# Patient Record
Sex: MALE | Race: WHITE | NOT HISPANIC OR LATINO | Employment: FULL TIME | URBAN - METROPOLITAN AREA
[De-identification: names, ages, dates, MRNs, and addresses within clinical notes are randomized per-mention and may not be internally consistent; named-entity substitution may affect disease eponyms.]

---

## 2017-12-22 ENCOUNTER — OFFICE VISIT (OUTPATIENT)
Dept: URGENT CARE | Facility: CLINIC | Age: 62
End: 2017-12-22
Payer: COMMERCIAL

## 2017-12-22 PROCEDURE — 99203 OFFICE O/P NEW LOW 30 MIN: CPT

## 2017-12-27 NOTE — PROGRESS NOTES
Assessment   1  Cerumen impaction (380 4) (H61 20)   2  Squamous cell carcinoma (173 92) (C44 92)    Discussion/Summary   Discussion Summary:    Attempted to remove cerumen from L ear with lavage and currette but was unsuccessful to c/w debrox drops BID x 1 week and return for lavage to follow up with dermatologist for lesion on toe  Medication Side Effects Reviewed: Possible side effects of new medications were reviewed with the patient/guardian today  Understands and agrees with treatment plan: The treatment plan was reviewed with the patient/guardian  The patient/guardian understands and agrees with the treatment plan    Counseling Documentation With Imm: The patient, patient's family was counseled regarding instructions for management,-- patient and family education,-- importance of compliance with treatment  Chief Complaint   1  Ear Pain  Chief Complaint Free Text Note Form: Ear pain and blockage for 4 days      History of Present Illness   HPI: 67yo M p/w ear blockage x 4 days and lesion on toe x several mo  Pt has been using debrox drops without relief  Pt states he frequently gets his ears cleansed by his ENT but has not had this done in some time  Pt states that lesion on toe is very fragile and bleeds frequently  Pt has hx of SCC  Hospital Based Practices Required Assessment:      Pain Assessment      the patient states they have pain  The pain is located in the ear  Abuse And Domestic Violence Screen       Yes, the patient is safe at home  -- The patient states no one is hurting them  Depression And Suicide Screen  No, the patient has not had thoughts of hurting themself  No, the patient has not felt depressed in the past 7 days  Review of Systems   Focused-Male:      Constitutional: no fever or chills, feels well, no tiredness, no recent weight loss or weight gain        ENT: earache, but-- no nosebleeds,-- no sore throat,-- no hearing loss,-- no nasal discharge-- and-- no hoarseness  Cardiovascular: no complaints of slow or fast heart rate, no chest pain, no palpitations, no leg claudication or lower extremity edema  Respiratory: no complaints of shortness of breath, no wheezing or cough, no dyspnea on exertion, no orthopnea or PND  Gastrointestinal: no complaints of abdominal pain, no constipation, no nausea or vomiting, no diarrhea or bloody stools  Genitourinary: no complaints of dysuria or incontinence, no hesitancy, no nocturia, no genital lesion, no inadequacy of penile erection  Musculoskeletal: no complaints of arthralgia, no myalgia, no joint swelling or stiffness, no limb pain or swelling  Integumentary: skin lesion, but-- no rashes,-- no itching-- and-- no skin wound  Neurological: no complaints of headache, no confusion, no numbness or tingling, no dizziness or fainting  ROS Reviewed:    ROS reviewed  Active Problems   1  Benign essential hypertension (401 1) (I10)   2  Diabetes (250 00) (E11 9)    Past Medical History   1  History of cardiac disorder (V12 50) (Z86 79)  Active Problems And Past Medical History Reviewed: The active problems and past medical history were reviewed and updated today  Family History   Family History Reviewed: The family history was reviewed and updated today  Social History    · Former smoker (W46 64) (C00 165)  Social History Reviewed: The social history was reviewed and is unchanged  Surgical History   1  History of Cholecystectomy   2  History of Dilation Salivary Duct   3  History of Heart Surgery  Surgical History Reviewed: The surgical history was reviewed and updated today  Current Meds    1  Aspirin 81 MG CAPS; Therapy: (Recorded:[id number]) to Recorded   2  Citalopram Hydrobromide TABS; Therapy: (Gaylin Meenakshi) to Recorded   3  Farxiga 5 MG Oral Tablet; Therapy: (Gaylin Meenakshi) to Recorded   4   HydroCHLOROthiazide 12 5 MG Oral Capsule; Therapy: (Sean Vu) to Recorded   5  NexIUM PACK; Therapy: (Recorded:[id number]) to Recorded  Medication List Reviewed: The medication list was reviewed and updated today  Allergies   1  No Known Drug Allergies    Vitals   Signs   Recorded: [id number] 12:50PM   Temperature: 97 6 F  Heart Rate: 74  Respiration: 18  Systolic: 150  Diastolic: 94  Height: 5 ft 7 in  Weight: 200 lb   BMI Calculated: 31 32  BSA Calculated: 2 02  O2 Saturation: 96    Physical Exam        Constitutional      General appearance: No acute distress, well appearing and well nourished  Ears, Nose, Mouth, and Throat      External inspection of ears and nose: Normal        Otoscopic examination: Abnormal   The right tympanic membrane was obscured completely by cerumen  The left tympanic membrane was obscured completely by cerumen  The right external canal was normal  The left external canal was normal       Nasal mucosa, septum, and turbinates: Normal without edema or erythema  Oropharynx: Normal with no erythema, edema, exudate or lesions  Pulmonary      Respiratory effort: No increased work of breathing or signs of respiratory distress  Auscultation of lungs: Clear to auscultation  no rales or crackles were heard bilaterally  no rhonchi  no friction rub  no wheezing  no diminished breath sounds  Cardiovascular      Palpation of heart: Normal PMI, no thrills  Auscultation of heart: Normal rate and rhythm, normal S1 and S2, without murmurs  Abdomen      Abdomen: Non-tender, no masses  Lymphatic      Palpation of lymph nodes in neck: Abnormal   bilateral anterior cervical node enlargement, but-- no posterior cervical node enlargement  Skin      Skin and subcutaneous tissue: Abnormal  -- pink, pearly half cm lesion to tip of third R toe        Psychiatric      Orientation to person, place and time: Normal        Mood and affect: Normal        Signatures    Electronically signed by : ROSA Campos; Dec 22 2017  3:08PM EST                       (Author)     Electronically signed by : VIVIEN Lopez ; Dec 26 2017 10:28AM EST                       (Co-author)

## 2018-01-23 VITALS
HEIGHT: 67 IN | TEMPERATURE: 97.6 F | OXYGEN SATURATION: 96 % | BODY MASS INDEX: 31.39 KG/M2 | SYSTOLIC BLOOD PRESSURE: 160 MMHG | WEIGHT: 200 LBS | DIASTOLIC BLOOD PRESSURE: 94 MMHG | RESPIRATION RATE: 18 BRPM | HEART RATE: 74 BPM

## 2020-09-21 LAB
LEFT EYE DIABETIC RETINOPATHY: NORMAL
RIGHT EYE DIABETIC RETINOPATHY: NORMAL

## 2020-11-07 LAB — HBA1C MFR BLD HPLC: 6.7 %

## 2021-02-17 ENCOUNTER — HOSPITAL ENCOUNTER (OUTPATIENT)
Dept: RADIOLOGY | Facility: CLINIC | Age: 66
Discharge: HOME | End: 2021-02-17
Payer: COMMERCIAL

## 2021-02-17 VITALS
SYSTOLIC BLOOD PRESSURE: 146 MMHG | TEMPERATURE: 98.6 F | HEART RATE: 93 BPM | BODY MASS INDEX: 31.39 KG/M2 | WEIGHT: 200 LBS | DIASTOLIC BLOOD PRESSURE: 90 MMHG | HEIGHT: 67 IN

## 2021-02-17 DIAGNOSIS — G89.29 CHRONIC LEFT SHOULDER PAIN: ICD-10-CM

## 2021-02-17 DIAGNOSIS — G89.29 CHRONIC LEFT SHOULDER PAIN: Primary | ICD-10-CM

## 2021-02-17 DIAGNOSIS — M75.82 TENDINITIS OF LEFT ROTATOR CUFF: ICD-10-CM

## 2021-02-17 DIAGNOSIS — M25.512 CHRONIC LEFT SHOULDER PAIN: ICD-10-CM

## 2021-02-17 DIAGNOSIS — M25.512 CHRONIC LEFT SHOULDER PAIN: Primary | ICD-10-CM

## 2021-02-17 PROCEDURE — 20610 DRAIN/INJ JOINT/BURSA W/O US: CPT | Performed by: ORTHOPAEDIC SURGERY

## 2021-02-17 PROCEDURE — 73030 X-RAY EXAM OF SHOULDER: CPT

## 2021-02-17 PROCEDURE — 99204 OFFICE O/P NEW MOD 45 MIN: CPT | Performed by: ORTHOPAEDIC SURGERY

## 2021-02-17 RX ORDER — CARVEDILOL 25 MG/1
25 TABLET ORAL 2 TIMES DAILY WITH MEALS
COMMUNITY
Start: 2021-02-17 | End: 2021-08-12 | Stop reason: SDUPTHER

## 2021-02-17 RX ORDER — ANTIOX #8/OM3/DHA/EPA/LUT/ZEAX 250-2.5 MG
1 CAPSULE ORAL DAILY
COMMUNITY
Start: 2014-08-20

## 2021-02-17 RX ORDER — TRIAMCINOLONE ACETONIDE 5 MG/G
1 CREAM TOPICAL 2 TIMES DAILY
COMMUNITY
Start: 2021-01-25 | End: 2021-04-26

## 2021-02-17 RX ORDER — SEMAGLUTIDE 1.34 MG/ML
1 INJECTION, SOLUTION SUBCUTANEOUS WEEKLY
COMMUNITY
Start: 2021-02-17 | End: 2021-10-18

## 2021-02-17 RX ORDER — ATORVASTATIN CALCIUM 80 MG/1
80 TABLET, FILM COATED ORAL DAILY
COMMUNITY
Start: 2021-02-17 | End: 2021-08-12 | Stop reason: SDUPTHER

## 2021-02-17 RX ORDER — LIDOCAINE HYDROCHLORIDE 10 MG/ML
4 INJECTION, SOLUTION INFILTRATION; PERINEURAL
Status: COMPLETED | OUTPATIENT
Start: 2021-02-17 | End: 2021-02-17

## 2021-02-17 RX ORDER — HYDROCHLOROTHIAZIDE 25 MG/1
25 TABLET ORAL DAILY
COMMUNITY
Start: 2021-02-17 | End: 2021-08-12 | Stop reason: SDUPTHER

## 2021-02-17 RX ORDER — RAMIPRIL 10 MG/1
10 CAPSULE ORAL DAILY
COMMUNITY
Start: 2021-02-17 | End: 2021-08-12 | Stop reason: SDUPTHER

## 2021-02-17 RX ORDER — NAPROXEN 500 MG/1
500 TABLET ORAL 2 TIMES DAILY WITH MEALS
Qty: 60 TABLET | Refills: 1 | Status: SHIPPED | OUTPATIENT
Start: 2021-02-17 | End: 2021-04-26

## 2021-02-17 RX ORDER — DAPAGLIFLOZIN 10 MG/1
TABLET, FILM COATED ORAL
COMMUNITY
Start: 2021-01-08 | End: 2021-08-12 | Stop reason: SDUPTHER

## 2021-02-17 RX ORDER — TRIAMCINOLONE ACETONIDE 40 MG/ML
40 INJECTION, SUSPENSION INTRA-ARTICULAR; INTRAMUSCULAR
Status: COMPLETED | OUTPATIENT
Start: 2021-02-17 | End: 2021-02-17

## 2021-02-17 RX ORDER — ASPIRIN 81 MG/1
81 TABLET ORAL DAILY
COMMUNITY
Start: 2014-08-20

## 2021-02-17 RX ORDER — CITALOPRAM 10 MG/1
10 TABLET ORAL DAILY
COMMUNITY
Start: 2021-02-17 | End: 2021-08-12 | Stop reason: SDUPTHER

## 2021-02-17 RX ORDER — ESOMEPRAZOLE MAGNESIUM 5 MG/1
5 GRANULE, DELAYED RELEASE ORAL DAILY
COMMUNITY
End: 2021-08-12 | Stop reason: SDUPTHER

## 2021-02-17 RX ADMIN — TRIAMCINOLONE ACETONIDE 40 MG: 40 INJECTION, SUSPENSION INTRA-ARTICULAR; INTRAMUSCULAR at 18:14

## 2021-02-17 RX ADMIN — LIDOCAINE HYDROCHLORIDE 4 ML: 10 INJECTION, SOLUTION INFILTRATION; PERINEURAL at 18:14

## 2021-02-17 NOTE — PROGRESS NOTES
ASSESSMENT/PLAN:    Diagnoses and all orders for this visit:    Chronic left shoulder pain  -     XR shoulder 2+ vw left; Future  -     naproxen (NAPROSYN) 500 mg tablet; Take 1 tablet (500 mg total) by mouth 2 (two) times a day with meals    Tendinitis of left rotator cuff    Other orders  -     aspirin (Aspirin Adult Low Strength) 81 mg EC tablet  -     atorvastatin (LIPITOR) 80 mg tablet  -     carvedilol (COREG) 25 mg tablet  -     citalopram (CeleXA) 10 mg tablet  -     Coenzyme Q10 (CoQ-10) 100 MG CAPS  -     Farxiga 10 MG TABS; TAKE 10 MG BY MOUTH DAILY  -     esomeprazole (NexIUM) 5 MG packet; Take by mouth  -     hydrochlorothiazide (HYDRODIURIL) 25 mg tablet  -     Multiple Vitamins-Minerals (PreserVision AREDS 2) CAPS  -     ramipril (ALTACE) 10 MG capsule  -     Ozempic, 1 MG/DOSE, 2 MG/1 5ML SOPN  -     triamcinolone (KENALOG) 0 5 % cream; Apply 1 application topically 2 (two) times a day To affected area          Plan:  I discussed treatment options  He elected to proceed with injection which was performed without difficulty  Precautions were reviewed  I will see him in 2 weeks for re-evaluation  He has been taking some Aleve for the symptoms and I have prescribed naproxen which should be taken routinely and I have also placed a prescription for therapy into his chart  He is to contact the office if questions or concerns arise  Return in about 2 weeks (around 3/3/2021)  _____________________________________________________  CHIEF COMPLAINT:  Chief Complaint   Patient presents with    Left Shoulder - Pain         SUBJECTIVE:  Caridad Paulino is a 63y o  year old ambidextrous male who presents  For evaluation of left shoulder pain  He notes chronic shoulder pain dating back to beyond 3 months ago  However, 3 months ago, he fell landing on the outstretched left upper extremity as he fell backwards causing an acute exacerbation of his chronic pain    Since that time, the pain has improved to a minimal degree but still remains notably worse than it was prior to the fall  He notes difficulty sleeping  His primary difficulty is with abduction and external rotation  He complains of pain across top of the shoulder radiating down the posterior aspect of the proximal arm  Occasionally he feels pain in his biceps area  He denies upper extremity paresthesias  He has had no treatment  He has used some ice and over-the-counter analgesics on his own  He contact his primary care physician and was referred for orthopedic consultation and treatment  PAST MEDICAL HISTORY:  Past Medical History:   Diagnosis Date    Diabetes mellitus [facility] [address] )     Heart attack ([facility] [address] )     High cholesterol     Hypertension        PAST SURGICAL HISTORY:  Past Surgical History:   Procedure Laterality Date    CHOLECYSTECTOMY      VASECTOMY      VASECTOMY REVERSAL         FAMILY HISTORY:  Family History   Problem Relation Age of Onset    Dementia Mother     No Known Problems Father        SOCIAL HISTORY:  Social History     Tobacco Use    Smoking status: Former Smoker     Quit date: 2015     Years since quittin 0    Smokeless tobacco: Never Used   Substance Use Topics    Alcohol use: Yes     Comment: occasionally    Drug use: Never       MEDICATIONS:    Current Outpatient Medications:     aspirin (Aspirin Adult Low Strength) 81 mg EC tablet, , Disp: , Rfl:     atorvastatin (LIPITOR) 80 mg tablet, , Disp: , Rfl:     carvedilol (COREG) 25 mg tablet, , Disp: , Rfl:     citalopram (CeleXA) 10 mg tablet, , Disp: , Rfl:     Coenzyme Q10 (CoQ-10) 100 MG CAPS, , Disp: , Rfl:     esomeprazole (NexIUM) 5 MG packet, Take by mouth, Disp: , Rfl:     Farxiga 10 MG TABS, TAKE 10 MG BY MOUTH DAILY  , Disp: , Rfl:     hydrochlorothiazide (HYDRODIURIL) 25 mg tablet, , Disp: , Rfl:     Multiple Vitamins-Minerals (PreserVision AREDS 2) CAPS, , Disp: , Rfl:     Ozempic, 1 MG/DOSE, 2 MG/1 5ML SOPN, , Disp: , Rfl:     ramipril (ALTACE) 10 MG capsule, , Disp: , Rfl:     naproxen (NAPROSYN) 500 mg tablet, Take 1 tablet (500 mg total) by mouth 2 (two) times a day with meals, Disp: 60 tablet, Rfl: 1    triamcinolone (KENALOG) 0 5 % cream, Apply 1 application topically 2 (two) times a day To affected area, Disp: , Rfl:     ALLERGIES:  Allergies   Allergen Reactions    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       Review of systems:   Constitutional: Negative for fatigue, fever or loss of apetite  HENT: Negative  Respiratory: Negative for shortness of breath, dyspnea  Cardiovascular: Negative for chest pain/tightness  Gastrointestinal: Negative for abdominal pain, N/V  Endocrine: Negative for cold/heat intolerance, unexplained weight loss/gain  Genitourinary: Negative for flank pain, dysuria, hematuria  Musculoskeletal:   Positive as in the HPI   Skin: Negative for rash  Neurological:  negative  Psychiatric/Behavioral: Negative for agitation  _____________________________________________________  PHYSICAL EXAMINATION:    Blood pressure 146/90, pulse 93, temperature 98 6 °F (37 °C), temperature source Temporal, height 5' 7" (1 702 m), weight 90 7 kg (200 lb)  General: well developed and well nourished, alert, oriented times 3 and appears comfortable  Psychiatric: Normal  HEENT: Benign  Cardiovascular: Regular    Pulmonary: No wheezing or stridor  Abdomen: Soft, Nontender  Skin: No masses, erythema, lacerations, fluctation, ulcerations  Neurovascular: Motor and sensory exams are intact and pulses are palpable  MUSCULOSKELETAL EXAMINATION:      The left shoulder exam demonstrates approximately 140° of abduction and forward flexion with pain during mid range abduction  He has 90° of both internal and external rotation in the abducted position and 40° of external rotation with his arm at his side  He did complain of some pain with abduction/ IR    Magui's, [place]'s, Russo and speed's tests are all negative  He has some mild prominence of the acromioclavicular joint bilaterally but no tenderness  The rotator cuff and biceps tendons are nontender  The shoulder is stable to exam   He has good strength of shoulder musculature but did complain of pain during resisted external rotation of the left shoulder  There is no periscapular tenderness  _____________________________________________________  STUDIES REVIEWED:    X-rays of her shoulder demonstrated no acute abnormality  He does have degenerative disease    PROCEDURES:  Large joint arthrocentesis: L subacromial bursa  Universal Protocol:  Consent: Verbal consent obtained    Consent given by: patient  Patient understanding: patient states understanding of the procedure being performed    Supporting Documentation  Indications: pain   Procedure Details  Location: shoulder - L subacromial bursa  Needle size: 22 G  Approach: posterolateral  Medications administered: 4 mL lidocaine 1 %; 40 mg triamcinolone acetonide 40 mg/mL              Rudy Lilly

## 2021-02-22 ENCOUNTER — EVALUATION (OUTPATIENT)
Dept: PHYSICAL THERAPY | Facility: CLINIC | Age: 66
End: 2021-02-22
Payer: COMMERCIAL

## 2021-02-22 DIAGNOSIS — M75.82 TENDINITIS OF LEFT ROTATOR CUFF: ICD-10-CM

## 2021-02-22 DIAGNOSIS — G89.29 CHRONIC LEFT SHOULDER PAIN: ICD-10-CM

## 2021-02-22 DIAGNOSIS — M25.512 CHRONIC LEFT SHOULDER PAIN: ICD-10-CM

## 2021-02-22 PROCEDURE — 97140 MANUAL THERAPY 1/> REGIONS: CPT | Performed by: PHYSICAL THERAPIST

## 2021-02-22 PROCEDURE — 97162 PT EVAL MOD COMPLEX 30 MIN: CPT | Performed by: PHYSICAL THERAPIST

## 2021-02-22 PROCEDURE — 97535 SELF CARE MNGMENT TRAINING: CPT | Performed by: PHYSICAL THERAPIST

## 2021-02-22 PROCEDURE — 97014 ELECTRIC STIMULATION THERAPY: CPT | Performed by: PHYSICAL THERAPIST

## 2021-02-22 NOTE — PROGRESS NOTES
PT Evaluation   Today's date: 2021  Patient name: Erich Dueñas  : 1955  MRN: [id number]  Referring provider: Dina Beltre DO  Dx:   Encounter Diagnosis     ICD-10-CM    1  Chronic left shoulder pain  M25 512 Ambulatory referral to Physical Therapy    G89 29    2  Tendinitis of left rotator cuff  M75 82 Ambulatory referral to Physical Therapy     Assessment  Assessment details: Patient displayed issues consistent with left shoulder rotator cuff issues / injury  Suspect this was aggravated by his fall a few months ago  Impairments: abnormal or restricted ROM, abnormal movement, activity intolerance, impaired physical strength, lacks appropriate home exercise program, pain with function, safety issue and scapular dyskinesis  Understanding of Dx/Px/POC: excellent   Prognosis: good    Goals  STG 2-4 weeks:   Increase UE strength by 3-6 lbs  Decrease pain by 1-2 levels on 1-10 pain scale  Increase UE PROM by 10-15 Degrees in all planes  Reduce C/O pain with lying on either side  Initiate HEP  LTG 6-8 weeks:   Increase UE strength 10-20 lbs  Demonstrate UE AROM WFL in all planes  Decrease pain to <2  Improve strength by 10-20 lbs  Return to lying on their right or left side with minimal difficulty  Improve sleep status limitations to none  D/C with HEP  Plan  Plan details: All planned modality interventions and planned therapy interventions are provided PRN    Patient would benefit from: PT eval and skilled physical therapy  Planned modality interventions: ultrasound and unattended electrical stimulation  Planned therapy interventions: joint mobilization, manual therapy, neuromuscular re-education, postural training, self care, strengthening, stretching, therapeutic activities, therapeutic exercise, therapeutic training, transfer training, coordination, flexibility, graded exercise and home exercise program  Frequency: 3x week  Duration in weeks: 12  Treatment plan discussed with: patient      Subjective Evaluation    Pain  Quality: discomfort, knife-like, pulling, squeezing, sharp and tight  Relieving factors: support, rest, relaxation and change in position  Aggravating factors: lifting and overhead activity  Progression: worsening    Treatments  Current treatment: physical therapy  Patient Goals  Patient goals for therapy: decreased pain, increased motion, return to work, return to [employer] activities, independence with ADLs/IADLs and increased strength      Date of onset:  12/14/2020    Date of Surgery:  None    History of Present Episode: 2/22/2021  Chelsie Thalia states his left shoulder had been bothering him for several months  He slipped and fell about three months ago and his left shoulder really flared up  He does not know why his left shoulder started to flare up many months before he fell but falling really flared up his left shoulder  Past Medical History:    2/22/2021  Chelsie Thalia reports diabetes type two  Heart stint  Previous Level of Functional Ability:  2/22/2021  Chelsie Vásquez states he had no limitations with his left shoulder  He worked without difficulties or restrictions  Inspection / Palpation:  Shoulder:  2/22/2021  Mesomorphic body type  No signs of infection  No signs of wounds  No signs of drainage  No signs of ecchymotic regions  No signs of erythremic regions  Mild to moderate signs of muscle spasm  Mild to moderate signs of muscle guarding  Mild to moderate signs of tenderness reported to palpation  Mild signs of atrophy noted  No signs of swelling  No signs of a surgery site  Current conditions appears consistent with recent acute episode  Chief Complaints:  2/22/2021  Chelsie Vásquez reports moderate difficulty with bending his left shoulder  Chelsie Vásquez reports moderate difficulty with movement of his left shoulder  Chelsie Vásquez reports moderate difficulty with use of his left arm  Chlesie Vásquez reports moderate difficulty with lifting / elevating his left arm  Lakshmi Man reports moderate difficulty with sleeping  Lakshmi Man reports moderate difficulty with his strength and endurance  Lakshmi Man reports moderate limitations with his left shoulder range of motion  Lakshmi Man reports moderate difficulty lying on his left shoulder region  SHOULDER PAIN Resting Palpation Moving Lifting Elevating   2/22/2021 Rt 0 0 0 0 0   2/22/2021 Lt  0 0-4 0-4 0-4 4-6     SHOULDER PAIN Sleeping Throwing Pushing Pulling Twisting   2/22/2021 Rt 0 0 0 0 0   2/22/2021 Lt  4-6 4-6 0-4 0-4 0-4     SHOULDER AROM Flexion Extension Abduction   2/22/2021 Rt 185° 65° 185°   2/22/2021 Lt 175° 55° 175°     SHOULDER AROM Hor Add Ext Rotation Int Rotation   2/22/2021 Rt 75° 95° 95°   2/22/2021 Lt 65° 82° 95°     SHLD MMT / PAIN Flexion Extension Abduction   2/22/2021 Rt 0/10  49 lbs 0/10  48 lbs 0/10  47 lbs   2/22/2021 Lt 0/10  35 lbs 0/10  32 lbs 0/10  35 lbs     SHLD MMT / PAIN Hor Add Ext Rotation Int Rotation   2/22/2021 Rt 0/10  41 lbs 0/10  32 lbs 0/10  33 lbs   2/22/2021 Lt 0/10  33 lbs 0/10  21 lbs 0/10  22 lbs     Shoulder Screen Rotator Cuff Apprehension Anterior  Stability Posterior  Stability   2/22/2021 Rt Negative Negative Negative Negative   2/22/2021 Lt POSITIVE Negative Negative Negative     Shoulder Screen AC Joint SC Joint Drop Arm Adhesive Capsulitis   2/22/2021 Rt Negative Negative Negative Negative   2/22/2021 Lt Negative Negative POSITIVE Negative     Precautions:  Left Shoulder Rotator Cuff Issues  All treatments below will be provided with a focus on strengthening, flexibility, ROM, postural,   endurance and any possible swelling and pain which may be present without ignoring   neural issues involving balance, coordination and proprioception which is also important   and necessary to provide full functional mobility and quality care        Daily Treatment Log  Manual  2/22       MT, ROM 15'       HEP 15'       Exercise Log 2/22       [facility]itin        [facility]-Curls,[employer]        [employer]        Finger Ladder        [place]-BP,PD,Lats,Row        Nu[employer]        W/P-PNF,IR,ER,PU,PS,Throw-Top,Mid,Bot        W/P-[place]        [employer] Sup/Pro        [facility] [employer] [place], [place] 66'               [facility] 2/22        / [place] / [place] 21'       US

## 2021-02-22 NOTE — LETTER
2021  [facility]  [address]    Patient: Verito Taylor   YOB: 1955   Date of Visit: 2021     Encounter Diagnosis     ICD-10-CM    1  Chronic left shoulder pain  M25 512 Ambulatory referral to Physical Therapy    G89 29    2  Tendinitis of left rotator cuff  M75 82 Ambulatory referral to Physical Therapy       Dear Dr Cesar Poles:    Thank you for your recent referral of Verito Taylor  Please review the attached evaluation summary from Dawson's recent visit  Please verify that you agree with the plan of care by signing the attached order  If you have any questions or concerns, please do not hesitate to call  I sincerely appreciate the opportunity to share in the care of one of your patients and hope to have another opportunity to work with you in the near future  Sincerely,    Malorie Wellington, PT      Referring Provider:      I certify that I have read the below Plan of Care and certify the need for these services furnished under this plan of treatment while under my care  Michael Young DO  [address] [place] [address]  [facility] [facility]          PT Evaluation   Today's date: 2021  Patient name: Verito Taylor  : 1955  MRN: [id number]  Referring provider: Eva Perales DO  Dx:   Encounter Diagnosis     ICD-10-CM    1  Chronic left shoulder pain  M25 512 Ambulatory referral to Physical Therapy    G89 29    2  Tendinitis of left rotator cuff  M75 82 Ambulatory referral to Physical Therapy     Assessment  Assessment details: Patient displayed issues consistent with left shoulder rotator cuff issues / injury  Suspect this was aggravated by his fall a few months ago    Impairments: abnormal or restricted ROM, abnormal movement, activity intolerance, impaired physical strength, lacks appropriate home exercise program, pain with function, safety issue and scapular dyskinesis  Understanding of Dx/Px/POC: excellent   Prognosis: good    Goals  STG 2-4 weeks:   Increase UE strength by 3-6 lbs  Decrease pain by 1-2 levels on 1-10 pain scale  Increase UE PROM by 10-15 Degrees in all planes  Reduce C/O pain with lying on either side  Initiate HEP  LTG 6-8 weeks:   Increase UE strength 10-20 lbs  Demonstrate UE AROM WFL in all planes  Decrease pain to <2  Improve strength by 10-20 lbs  Return to lying on their right or left side with minimal difficulty  Improve sleep status limitations to none  D/C with HEP  Plan  Plan details: All planned modality interventions and planned therapy interventions are provided PRN  Patient would benefit from: PT eval and skilled physical therapy  Planned modality interventions: ultrasound and unattended electrical stimulation  Planned therapy interventions: joint mobilization, manual therapy, neuromuscular re-education, postural training, self care, strengthening, stretching, therapeutic activities, therapeutic exercise, therapeutic training, transfer training, coordination, flexibility, graded exercise and home exercise program  Frequency: 3x week  Duration in weeks: 12  Treatment plan discussed with: patient      Subjective Evaluation    Pain  Quality: discomfort, knife-like, pulling, squeezing, sharp and tight  Relieving factors: support, rest, relaxation and change in position  Aggravating factors: lifting and overhead activity  Progression: worsening    Treatments  Current treatment: physical therapy  Patient Goals  Patient goals for therapy: decreased pain, increased motion, return to work, return to [employer] activities, independence with ADLs/IADLs and increased strength      Date of onset:  12/14/2020    Date of Surgery:  None    History of Present Episode: 2/22/2021  Kymas Mandi states his left shoulder had been bothering him for several months    He slipped and fell about three months ago and his left shoulder really flared up  He does not know why his left shoulder started to flare up many months before he fell but falling really flared up his left shoulder  Past Medical History:    2/22/2021  Quincy Onycha reports diabetes type two  Heart stint  Previous Level of Functional Ability:  2/22/2021  Quincy Onycha states he had no limitations with his left shoulder  He worked without difficulties or restrictions  Inspection / Palpation:  Shoulder:  2/22/2021  Mesomorphic body type  No signs of infection  No signs of wounds  No signs of drainage  No signs of ecchymotic regions  No signs of erythremic regions  Mild to moderate signs of muscle spasm  Mild to moderate signs of muscle guarding  Mild to moderate signs of tenderness reported to palpation  Mild signs of atrophy noted  No signs of swelling  No signs of a surgery site  Current conditions appears consistent with recent acute episode  Chief Complaints:  2/22/2021  Quincy Onycha reports moderate difficulty with bending his left shoulder  Mendel Onycha reports moderate difficulty with movement of his left shoulder  Mendel Onycha reports moderate difficulty with use of his left arm  Mendel Onycha reports moderate difficulty with lifting / elevating his left arm  Quincy Onycha reports moderate difficulty with sleeping  Quincy Onycha reports moderate difficulty with his strength and endurance  Quincy Onycha reports moderate limitations with his left shoulder range of motion  Quincy Onycha reports moderate difficulty lying on his left shoulder region      SHOULDER PAIN Resting Palpation Moving Lifting Elevating   2/22/2021 Rt 0 0 0 0 0   2/22/2021 Lt  0 0-4 0-4 0-4 4-6     SHOULDER PAIN Sleeping Throwing Pushing Pulling Twisting   2/22/2021 Rt 0 0 0 0 0   2/22/2021 Lt  4-6 4-6 0-4 0-4 0-4     SHOULDER AROM Flexion Extension Abduction   2/22/2021 Rt 185° 65° 185°   2/22/2021 Lt 175° 55° 175°     SHOULDER AROM Hor Add Ext Rotation Int Rotation   2/22/2021 Rt 75° 95° 95°   2/22/2021 Lt 65° 82° 95°     SHLD MMT / PAIN Flexion Extension Abduction   2/22/2021 Rt 0/10  49 lbs 0/10  48 lbs 0/10  47 lbs   2/22/2021 Lt 0/10  35 lbs 0/10  32 lbs 0/10  35 lbs     SHLD MMT / PAIN Hor Add Ext Rotation Int Rotation   2/22/2021 Rt 0/10  41 lbs 0/10  32 lbs 0/10  33 lbs   2/22/2021 Lt 0/10  33 lbs 0/10  21 lbs 0/10  22 lbs     Shoulder Screen Rotator Cuff Apprehension Anterior  Stability Posterior  Stability   2/22/2021 Rt Negative Negative Negative Negative   2/22/2021 Lt POSITIVE Negative Negative Negative     Shoulder Screen AC Joint SC Joint Drop Arm Adhesive Capsulitis   2/22/2021 Rt Negative Negative Negative Negative   2/22/2021 Lt Negative Negative POSITIVE Negative     Precautions:  Left Shoulder Rotator Cuff Issues  All treatments below will be provided with a focus on strengthening, flexibility, ROM, postural,   endurance and any possible swelling and pain which may be present without ignoring   neural issues involving balance, coordination and proprioception which is also important   and necessary to provide full functional mobility and quality care        Daily Treatment Log  Manual  2/22       MT, ROM 15'       HEP 15'       Exercise Log 2/22       [facility]        FWC-Codmans        FWC-Curls,Tri        Power Web        Digiflex        Finger Ladder        [place]-BP,PD,Lats,Row        NuStep        W/P-PNF,IR,ER,PU,PS,Throw-Top,Mid,Bot        W/P-OH        [employer] Sup/Pro        Jonathon Joyce [place] 51'               Modalities 2/22        / [facility] / CALEB 21'       US

## 2021-02-24 ENCOUNTER — APPOINTMENT (OUTPATIENT)
Dept: PHYSICAL THERAPY | Facility: CLINIC | Age: 66
End: 2021-02-24
Payer: COMMERCIAL

## 2021-02-25 ENCOUNTER — OFFICE VISIT (OUTPATIENT)
Dept: PHYSICAL THERAPY | Facility: CLINIC | Age: 66
End: 2021-02-25
Payer: COMMERCIAL

## 2021-02-25 DIAGNOSIS — M25.512 CHRONIC LEFT SHOULDER PAIN: Primary | ICD-10-CM

## 2021-02-25 DIAGNOSIS — M75.82 TENDINITIS OF LEFT ROTATOR CUFF: ICD-10-CM

## 2021-02-25 DIAGNOSIS — G89.29 CHRONIC LEFT SHOULDER PAIN: Primary | ICD-10-CM

## 2021-02-25 PROCEDURE — 97112 NEUROMUSCULAR REEDUCATION: CPT | Performed by: PHYSICAL THERAPIST

## 2021-02-25 PROCEDURE — 97140 MANUAL THERAPY 1/> REGIONS: CPT

## 2021-02-25 PROCEDURE — 97014 ELECTRIC STIMULATION THERAPY: CPT

## 2021-02-25 NOTE — PROGRESS NOTES
Today's date: 2021  Patient name: Poornima Dawkins  : 1955  MRN: [id number]  Referring provider: Zachary Snider DO  Dx:   Encounter Diagnosis     ICD-10-CM    1  Chronic left shoulder pain  M25 512     G89 29    2  Tendinitis of left rotator cuff  M75 82      Subjective:  No new c/o pain today  Objective: See treatment log below  Belinda Hunt continues to be advised to follow his home exercise program as tolerated  When Belinda Hunt is feeling good he follows his rehab exercises in the gym and on other days he follows his home exercise program at home as tolerated  In the future we plan on having Dawson stay at home and follow his home exercise program for four to six weeks and than assess for either more Rehab treatments or discharge from Rehab care  Assessment: Tolerated treatment well  Patient exhibited good technique with therapeutic exercises and would benefit from continued PT  Dawson's goals are to continue to improve with his rehab program and improve with functional mobility, speed, repetition and decreased c/o pain with his gym and home exercise program   Dawson's final goal for his rehab is to be discharged from Rehab care after obtaining his full functional rehab potential     Plan: Continue per plan of care  Progress treatment as tolerated  Precautions:  Left Shoulder Rotator Cuff Issues  All treatments below will be provided with a focus on strengthening, flexibility, ROM, postural,   endurance and any possible swelling and pain which may be present without ignoring   neural issues involving balance, coordination and proprioception which is also important   and necessary to provide full functional mobility and quality care        Daily Treatment Log  Manual        MT, ROM 15' 30'      HEP 15'       Exercise Log       UBC  10'      FWC-Nitin BELLA-Rob,Tri        Power Web        Digiflex        Finger Ladder        [place]-BP,PD,Lats,Row        NuStep W/P-PNF,IR,ER,PU,PS,Throw-Top,Mid,Bot        W/P-OH  2x10'      Rotation Bar Sup/Pro        WallSlidesITVY  2x10      [facility] 15' 15'              Modalities 2/22 2/25      MH / PE / ES 21' 20'      US

## 2021-03-02 ENCOUNTER — OFFICE VISIT (OUTPATIENT)
Dept: PHYSICAL THERAPY | Facility: CLINIC | Age: 66
End: 2021-03-02
Payer: COMMERCIAL

## 2021-03-02 DIAGNOSIS — M25.512 CHRONIC LEFT SHOULDER PAIN: Primary | ICD-10-CM

## 2021-03-02 DIAGNOSIS — M75.82 TENDINITIS OF LEFT ROTATOR CUFF: ICD-10-CM

## 2021-03-02 DIAGNOSIS — G89.29 CHRONIC LEFT SHOULDER PAIN: Primary | ICD-10-CM

## 2021-03-02 PROCEDURE — 97014 ELECTRIC STIMULATION THERAPY: CPT | Performed by: PHYSICAL THERAPIST

## 2021-03-02 PROCEDURE — 97112 NEUROMUSCULAR REEDUCATION: CPT

## 2021-03-02 PROCEDURE — 97140 MANUAL THERAPY 1/> REGIONS: CPT | Performed by: PHYSICAL THERAPIST

## 2021-03-02 NOTE — PROGRESS NOTES
Today's date: 3/2/2021  Patient name: Caridad Paulino  : 1955  MRN: [id number]  Referring provider: Cordelia Gillespie DO  Dx:   Encounter Diagnosis     ICD-10-CM    1  Chronic left shoulder pain  M25 512     G89 29    2  Tendinitis of left rotator cuff  M75 82      Subjective:  No new c/o pain today  Objective: See treatment log below  Dori Lott continues to be advised to follow his home exercise program as tolerated  When Dori Lott is feeling good he follows his rehab exercises in the gym and on other days he follows his home exercise program at home as tolerated  In the future we plan on having Dawson stay at home and follow his home exercise program for four to six weeks and than assess for either more Rehab treatments or discharge from Rehab care  Assessment: Tolerated treatment well  Patient exhibited good technique with therapeutic exercises and would benefit from continued PT  Dawson's goals are to continue to improve with his rehab program and improve with functional mobility, speed, repetition and decreased c/o pain with his gym and home exercise program   Dawson's final goal for his rehab is to be discharged from Rehab care after obtaining his full functional rehab potential     Plan: Continue per plan of care  Progress treatment as tolerated  Precautions:  Left Shoulder Rotator Cuff Issues  All treatments below will be provided with a focus on strengthening, flexibility, ROM, postural,   endurance and any possible swelling and pain which may be present without ignoring   neural issues involving balance, coordination and proprioception which is also important   and necessary to provide full functional mobility and quality care        Daily Treatment Log  Manual   3/     MT, ROM 15' 30' 20'     HEP 15'       Exercise Log 2/22 2/25 3/2     UBC  10' 10'     FWC-Codmans        FWC-Curls,Tri        Power Web        Digiflex        Finger Ladder        [place]-BP,PD,Lats,Row NuStep        W/P-PNF,IR,ER,PU,PS,Throw-Top,Mid,Bot        W/P-OH  2x10' 2x10'     Rotation Bar Sup/Pro        WallSlidesITVY  2x10 2x10     Ball Wall Rolls  30x 30x     DORIAN Trejo 15' 15' 15'             Modalities 2/22 2/25 3/2     PARK / PE / ES 21' 20' 20'     US

## 2021-03-03 ENCOUNTER — OFFICE VISIT (OUTPATIENT)
Dept: OBGYN CLINIC | Facility: CLINIC | Age: 66
End: 2021-03-03
Payer: COMMERCIAL

## 2021-03-03 VITALS
WEIGHT: 200 LBS | HEART RATE: 86 BPM | DIASTOLIC BLOOD PRESSURE: 88 MMHG | HEIGHT: 67 IN | SYSTOLIC BLOOD PRESSURE: 140 MMHG | TEMPERATURE: 97.6 F | BODY MASS INDEX: 31.39 KG/M2

## 2021-03-03 DIAGNOSIS — M75.82 TENDINITIS OF LEFT ROTATOR CUFF: ICD-10-CM

## 2021-03-03 DIAGNOSIS — M25.512 CHRONIC LEFT SHOULDER PAIN: Primary | ICD-10-CM

## 2021-03-03 DIAGNOSIS — G89.29 CHRONIC LEFT SHOULDER PAIN: Primary | ICD-10-CM

## 2021-03-03 PROCEDURE — 99213 OFFICE O/P EST LOW 20 MIN: CPT | Performed by: ORTHOPAEDIC SURGERY

## 2021-03-03 NOTE — PROGRESS NOTES
ASSESSMENT/PLAN:    Diagnoses and all orders for this visit:    Chronic left shoulder pain    Tendinitis of left rotator cuff        The patient was advised to continued home exercises as provided by PT if he does d/c  He may continue with Advil but was reminded not to take Advil with Aleve or naproxen  He was also reminded to take Advil with food to minimize GI upset  We will see the patient back in 4 weeks  He was encouraged to contact the office should questions or concerns arise  Return in about 4 weeks (around 3/31/2021)  _____________________________________________________  CHIEF COMPLAINT:  Chief Complaint   Patient presents with    Follow-up         SUBJECTIVE:  Poornima Dawkins is a 63 y o  male who presents for follow up of left shoulder pain  The patient is status post left corticosteroid injection performed at his last visit on 02/17/2021  He reports since he was last seen, he has noted significant improvement in his pain  He did go to physical therapy and reports that they have begun discussing d/c to home exercises  He continues to experience pain with overhead activities  He tried taking naproxen as prescribed by Dr Carolyn Roth but noted significant GI upset  He has switched to taking I Advil at night prior to bed, which does not seem to upset his stomach is much  He denies numbness or tingling in the left upper extremity  He denies any new falls or trauma        PAST MEDICAL HISTORY:  Past Medical History:   Diagnosis Date    Diabetes mellitus ([facility] [address] [facility]     Heart attack ([facility] [address] [facility]     High cholesterol     Hypertension        PAST SURGICAL HISTORY:  Past Surgical History:   Procedure Laterality Date    CHOLECYSTECTOMY      VASECTOMY      VASECTOMY REVERSAL         FAMILY HISTORY:  Family History   Problem Relation Age of Onset    Dementia Mother     No Known Problems Father        SOCIAL HISTORY:  Social History     Tobacco Use    Smoking status: Former Smoker     Quit date: 2/17/2015 Years since quittin 0    Smokeless tobacco: Never Used   Substance Use Topics    Alcohol use: Yes     Comment: occasionally    Drug use: Never       MEDICATIONS:    Current Outpatient Medications:     aspirin (Aspirin Adult Low Strength) 81 mg EC tablet, , Disp: , Rfl:     atorvastatin (LIPITOR) 80 mg tablet, , Disp: , Rfl:     carvedilol (COREG) 25 mg tablet, , Disp: , Rfl:     citalopram (CeleXA) 10 mg tablet, , Disp: , Rfl:     Coenzyme Q10 (CoQ-10) 100 MG CAPS, , Disp: , Rfl:     esomeprazole (NexIUM) 5 MG packet, Take by mouth, Disp: , Rfl:     Farxiga 10 MG TABS, TAKE 10 MG BY MOUTH DAILY  , Disp: , Rfl:     hydrochlorothiazide (HYDRODIURIL) 25 mg tablet, , Disp: , Rfl:     Multiple Vitamins-Minerals (PreserVision AREDS 2) CAPS, , Disp: , Rfl:     Ozempic, 1 MG/DOSE, 2 MG/1 5ML SOPN, , Disp: , Rfl:     ramipril (ALTACE) 10 MG capsule, , Disp: , Rfl:     naproxen (NAPROSYN) 500 mg tablet, Take 1 tablet (500 mg total) by mouth 2 (two) times a day with meals (Patient not taking: Reported on 3/3/2021), Disp: 60 tablet, Rfl: 1    triamcinolone (KENALOG) 0 5 % cream, Apply 1 application topically 2 (two) times a day To affected area, Disp: , Rfl:     ALLERGIES:  Allergies   Allergen Reactions    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       REVIEW OF SYSTEMS:  Pertinent items are noted in HPI  A comprehensive review of systems was negative       _____________________________________________________  PHYSICAL EXAMINATION:  General: well developed and well nourished, alert, oriented times 3 and appears comfortable  Psychiatric: Normal  HEENT:  Normocephalic, atraumatic  Cardiovascular:  Regular  Pulmonary: No wheezing or stridor  Skin: No masses, erthema, lacerations, fluctation, ulcerations  Neurovascular: Motor and sensory exams are grossly intact, 2+ radial pulse  Limb is warm well perfused with good color and capillary refill of the digits        MUSCULOSKELETAL EXAMINATION:    The left shoulder exam demonstrates skin intact, no erythema, no ecchymosis, no significant swelling  He has no tenderness to palpation  He has full active and passive range of motion of the shoulder but does complain of pain with forward flexion, abduction, and internal rotation  He has 5/5 strength with resisted external and internal rotation  4+/5 strength with resisted forward flexion, limited secondary to pain  The remainder of the left upper extremity exam is benign  _____________________________________________________  STUDIES REVIEWED:  No new imaging performed today    PROCEDURES PERFORMED:   Procedures  None performed today            Leah Adams PA-C

## 2021-03-04 ENCOUNTER — OFFICE VISIT (OUTPATIENT)
Dept: PHYSICAL THERAPY | Facility: CLINIC | Age: 66
End: 2021-03-04
Payer: COMMERCIAL

## 2021-03-04 DIAGNOSIS — M25.512 CHRONIC LEFT SHOULDER PAIN: Primary | ICD-10-CM

## 2021-03-04 DIAGNOSIS — G89.29 CHRONIC LEFT SHOULDER PAIN: Primary | ICD-10-CM

## 2021-03-04 DIAGNOSIS — M75.82 TENDINITIS OF LEFT ROTATOR CUFF: ICD-10-CM

## 2021-03-04 PROCEDURE — 97140 MANUAL THERAPY 1/> REGIONS: CPT

## 2021-03-04 PROCEDURE — 97112 NEUROMUSCULAR REEDUCATION: CPT

## 2021-03-04 PROCEDURE — 97014 ELECTRIC STIMULATION THERAPY: CPT | Performed by: PHYSICAL THERAPIST

## 2021-03-04 NOTE — PROGRESS NOTES
Today's date: 3/4/2021  Patient name: Catalina Gracia  : 1955  MRN: [id number]  Referring provider: Claudene Favor, DO  Dx:   Encounter Diagnosis     ICD-10-CM    1  Chronic left shoulder pain  M25 512     G89 29    2  Tendinitis of left rotator cuff  M75 82      Subjective:  No new c/o pain today  Objective: See treatment log below  Juliann Sarah continues to be advised to follow his home exercise program as tolerated  When Juliann Sarah is feeling good he follows his rehab exercises in the gym and on other days he follows his home exercise program at home as tolerated  In the future we plan on having Dawson stay at home and follow his home exercise program for four to six weeks and than assess for either more Rehab treatments or discharge from Rehab care  Assessment: Tolerated treatment well  Patient exhibited good technique with therapeutic exercises and would benefit from continued PT  Dawson's goals are to continue to improve with his rehab program and improve with functional mobility, speed, repetition and decreased c/o pain with his gym and home exercise program   Dawson's final goal for his rehab is to be discharged from Rehab care after obtaining his full functional rehab potential     Plan: Continue per plan of care  Progress treatment as tolerated  Precautions:  Left Shoulder Rotator Cuff Issues  All treatments below will be provided with a focus on strengthening, flexibility, ROM, postural,   endurance and any possible swelling and pain which may be present without ignoring   neural issues involving balance, coordination and proprioception which is also important   and necessary to provide full functional mobility and quality care        Daily Treatment Log  Manual  2/22 2/25 3/2 3/4    MT, ROM 15' 30' 20' 20'    HEP 15'       Exercise Log 2/22 2/25 3/2 3/4    UBC  10' 10' 10'    FW-Codmans        FW-Curls,Tri        Power Web        Digiflex        Finger Ladder [place]-BP,PD,Lats,Row        NuStep        W/P-PNF,IR,ER,PU,PS,Throw-Top,Mid,Bot        W/P-OH  2x10' 2x10' 2x10'    Rotation Bar Sup/Pro        WallSlidesITVY  2x10 2x10 2x10    Ball Wall Rolls  30x 30x 30x    ME, PE 15' 15' 15' 15'            Modalities 2/22 2/25 3/2 3/4    MH / PE / ES 21' 20' 20' 20'    US

## 2021-03-09 ENCOUNTER — OFFICE VISIT (OUTPATIENT)
Dept: PHYSICAL THERAPY | Facility: CLINIC | Age: 66
End: 2021-03-09
Payer: COMMERCIAL

## 2021-03-09 DIAGNOSIS — G89.29 CHRONIC LEFT SHOULDER PAIN: Primary | ICD-10-CM

## 2021-03-09 DIAGNOSIS — M25.512 CHRONIC LEFT SHOULDER PAIN: Primary | ICD-10-CM

## 2021-03-09 DIAGNOSIS — M75.82 TENDINITIS OF LEFT ROTATOR CUFF: ICD-10-CM

## 2021-03-09 PROCEDURE — 97110 THERAPEUTIC EXERCISES: CPT

## 2021-03-09 PROCEDURE — 97112 NEUROMUSCULAR REEDUCATION: CPT

## 2021-03-09 PROCEDURE — 97140 MANUAL THERAPY 1/> REGIONS: CPT

## 2021-03-09 NOTE — PROGRESS NOTES
Today's date: 3/9/2021  Patient name: Tegan Mari  : 1955  MRN: [id number]  Referring provider: Demetri Louie DO  Dx:   Encounter Diagnosis     ICD-10-CM    1  Chronic left shoulder pain  M25 512     G89 29    2  Tendinitis of left rotator cuff  M75 82      Subjective:  Patient stated that his symptoms improved following manuals to posterior shoulder with arm at 90/90  Objective: See treatment log below  Tiffany Soria continues to be advised to follow his home exercise program as tolerated  When Tiffany Soria is feeling good he follows his rehab exercises in the gym and on other days he follows his home exercise program at home as tolerated  In the future we plan on having Dawson stay at home and follow his home exercise program for four to six weeks and than assess for either more Rehab treatments or discharge from Rehab care  Assessment: Tolerated treatment well  Patient exhibited good technique with therapeutic exercises and would benefit from continued PT  Dawson's goals are to continue to improve with his rehab program and improve with functional mobility, speed, repetition and decreased c/o pain with his gym and home exercise program   Dawson's final goal for his rehab is to be discharged from Rehab care after obtaining his full functional rehab potential     Plan: Continue per plan of care  Progress treatment as tolerated  Precautions:  Left Shoulder Rotator Cuff Issues  All treatments below will be provided with a focus on strengthening, flexibility, ROM, postural,   endurance and any possible swelling and pain which may be present without ignoring   neural issues involving balance, coordination and proprioception which is also important   and necessary to provide full functional mobility and quality care        Daily Treatment Log  Manual  2/22 2/25 3/2 3/4 3/9   MT, ROM 15' 30' 20' 20' 20'   HEP 15'       Exercise Log 2/22 2/25 3/2 3/4 3/9   UBC  10' 10' 10' 10'   [facility],Tri        Power Web        Digiflex        Finger Ladder        [place]-BP,PD,Lats,Row        NuStep        W/P-PNF,IR,ER,PU,PS,Throw-Top,Mid,Bot        W/P-OH  2x10' 2x10' 2x10' 2x10'   Rotation Bar Sup/Pro        WallSlidesITVY  2x10 2x10 2x10 2x10   Ball Wall Rolls  30x 30x 30x 30x   ME, PE 15' 15' 15' 15' 15'           Modalities 2/22 2/25 3/2 3/4 3/9   MH / PE / ES 21' 20' 20' 20' 20'   US

## 2021-03-10 DIAGNOSIS — Z23 ENCOUNTER FOR IMMUNIZATION: ICD-10-CM

## 2021-03-11 ENCOUNTER — OFFICE VISIT (OUTPATIENT)
Dept: PHYSICAL THERAPY | Facility: CLINIC | Age: 66
End: 2021-03-11
Payer: COMMERCIAL

## 2021-03-11 DIAGNOSIS — G89.29 CHRONIC LEFT SHOULDER PAIN: Primary | ICD-10-CM

## 2021-03-11 DIAGNOSIS — M25.512 CHRONIC LEFT SHOULDER PAIN: Primary | ICD-10-CM

## 2021-03-11 DIAGNOSIS — M75.82 TENDINITIS OF LEFT ROTATOR CUFF: ICD-10-CM

## 2021-03-11 PROCEDURE — 97140 MANUAL THERAPY 1/> REGIONS: CPT

## 2021-03-11 NOTE — PROGRESS NOTES
Today's date: 3/11/2021  Patient name: Poornima Dawkins  : 1955  MRN: [id number]  Referring provider: Zachary Snider DO  Dx:   Encounter Diagnosis     ICD-10-CM    1  Chronic left shoulder pain  M25 512     G89 29    2  Tendinitis of left rotator cuff  M75 82      Subjective:  Pt reports L shoulder pain /10 following work today, requests to focus on MT for tx  Objective: See treatment diary below  Assessment: Pt exhibited good response to MT and reports decreased pain levels following tx  Would benefit from continued therapy for improving pain levels and restoring RC function  Plan: Continue per plan of care  Progress treatment as tolerated  Precautions:  Left Shoulder Rotator Cuff Issues  All treatments below will be provided with a focus on strengthening, flexibility, ROM, postural,   endurance and any possible swelling and pain which may be present without ignoring   neural issues involving balance, coordination and proprioception which is also important   and necessary to provide full functional mobility and quality care        Daily Treatment Log  Manual  3/11   3/4 3/9   MT, ROM 20'   20' 20'   HEP        Exercise Log    3/4 3/9   UBC    10' 10'   FWC-Codmans        FWC-Curls,Tri        Power Web        Digiflex        Finger Ladder        [place]-BP,PD,Lats,Row        NuStep        W/P-PNF,IR,ER,PU,PS,Throw-Top,Mid,Bot        W/P-OH    2x10' 2x10'   Rotation Bar Sup/Pro        WallSlidesITVY    2x10 2x10   E  I  [place]    30x 30x   [place], PE    15' 15'           Modalities 3/11   3/4 3/9   MH / PE / ES 20'   20' 20'

## 2021-03-16 ENCOUNTER — OFFICE VISIT (OUTPATIENT)
Dept: PHYSICAL THERAPY | Facility: CLINIC | Age: 66
End: 2021-03-16
Payer: COMMERCIAL

## 2021-03-16 DIAGNOSIS — M25.512 CHRONIC LEFT SHOULDER PAIN: Primary | ICD-10-CM

## 2021-03-16 DIAGNOSIS — G89.29 CHRONIC LEFT SHOULDER PAIN: Primary | ICD-10-CM

## 2021-03-16 DIAGNOSIS — M75.82 TENDINITIS OF LEFT ROTATOR CUFF: ICD-10-CM

## 2021-03-16 PROCEDURE — 97112 NEUROMUSCULAR REEDUCATION: CPT

## 2021-03-16 PROCEDURE — 97140 MANUAL THERAPY 1/> REGIONS: CPT

## 2021-03-16 PROCEDURE — 97014 ELECTRIC STIMULATION THERAPY: CPT

## 2021-03-16 NOTE — PROGRESS NOTES
Today's date: 3/16/2021  Patient name: Yisel San  : 1955  MRN: [id number]  Referring provider: Fausto Rosales DO  Dx:   Encounter Diagnosis     ICD-10-CM    1  Chronic left shoulder pain  M25 512     G89 29    2  Tendinitis of left rotator cuff  M75 82      Subjective:  No new c/o pain today  Objective: See treatment log below  Junaid Contreras continues to be advised to follow his home exercise program as tolerated  When Junaid Contreras is feeling good he follows his rehab exercises in the gym and on other days he follows his home exercise program at home as tolerated  In the future we plan on having Dawson stay at home and follow his home exercise program for four to six weeks and than assess for either more Rehab treatments or discharge from Rehab care  Assessment: Tolerated treatment well  Patient exhibited good technique with therapeutic exercises and would benefit from continued PT  Dawson's goals are to continue to improve with his rehab program and improve with functional mobility, speed, repetition and decreased c/o pain with his gym and home exercise program   Dawson's final goal for his rehab is to be discharged from Rehab care after obtaining his full functional rehab potential     Plan: Continue per plan of care  Progress treatment as tolerated  Precautions:  Left Shoulder Rotator Cuff Issues  All treatments below will be provided with a focus on strengthening, flexibility, ROM, postural,   endurance and any possible swelling and pain which may be present without ignoring   neural issues involving balance, coordination and proprioception which is also important   and necessary to provide full functional mobility and quality care        Daily Treatment Log  Manual  3/11 3/16      MT, ROM 20' 30'      HEP        Exercise Log  3/16      UBC  10'      FW-Nitin BELLA-Rob,Tri        Power Web        Digiflex        Finger Ladder        [place]-BP,PD,Lats,Row        NuStep W/P-PNF,IR,ER,PU,PS,Throw-Top,Mid,Bot        W/P-OH        Rotation Bar Sup/Pro        WallSlidesITVY  2x10      E  I  kalyn Wang  30x      [place], [place]  15'              Modalities 3/11 3/16      MH / PE / CALEB 21' 20'

## 2021-03-18 ENCOUNTER — OFFICE VISIT (OUTPATIENT)
Dept: PHYSICAL THERAPY | Facility: CLINIC | Age: 66
End: 2021-03-18
Payer: COMMERCIAL

## 2021-03-18 DIAGNOSIS — M25.512 CHRONIC LEFT SHOULDER PAIN: Primary | ICD-10-CM

## 2021-03-18 DIAGNOSIS — G89.29 CHRONIC LEFT SHOULDER PAIN: Primary | ICD-10-CM

## 2021-03-18 DIAGNOSIS — M75.82 TENDINITIS OF LEFT ROTATOR CUFF: ICD-10-CM

## 2021-03-18 PROCEDURE — 97140 MANUAL THERAPY 1/> REGIONS: CPT

## 2021-03-18 PROCEDURE — 97014 ELECTRIC STIMULATION THERAPY: CPT

## 2021-03-18 PROCEDURE — 97112 NEUROMUSCULAR REEDUCATION: CPT

## 2021-03-18 NOTE — PROGRESS NOTES
Today's date: 3/18/2021  Patient name: Yordan Mirza  : 1955  MRN: [id number]  Referring provider: India Ramos DO  Dx:   Encounter Diagnosis     ICD-10-CM    1  Chronic left shoulder pain  M25 512     G89 29    2  Tendinitis of left rotator cuff  M75 82      Subjective:  No new c/o pain today  Objective: See treatment log below  Hal Raymond continues to be advised to follow his home exercise program as tolerated  When Hal Raymond is feeling good he follows his rehab exercises in the gym and on other days he follows his home exercise program at home as tolerated  In the future we plan on having Dawson stay at home and follow his home exercise program for four to six weeks and than assess for either more Rehab treatments or discharge from Rehab care  Assessment: Tolerated treatment well  Patient exhibited good technique with therapeutic exercises and would benefit from continued PT  Dawson's goals are to continue to improve with his rehab program and improve with functional mobility, speed, repetition and decreased c/o pain with his gym and home exercise program   Dawson's final goal for his rehab is to be discharged from Rehab care after obtaining his full functional rehab potential     Plan: Continue per plan of care  Progress treatment as tolerated  Precautions:  Left Shoulder Rotator Cuff Issues  All treatments below will be provided with a focus on strengthening, flexibility, ROM, postural,   endurance and any possible swelling and pain which may be present without ignoring   neural issues involving balance, coordination and proprioception which is also important   and necessary to provide full functional mobility and quality care        Daily Treatment Log  Manual  3/11 3/16 3/18     MT, ROM 20' 30' 30'     HEP        Exercise Log  3/16 3/18     UBC  10' 10'     FWC-Codmans        FWC-Curls,Tri        Power Web        Digiflex        Finger Ladder        [place]-BP,PD,Lats,Row        NuStep W/P-PNF,IR,ER,PU,PS,Throw-Top,Mid,Bot        W/P-OH        Rotation Bar Sup/Pro        WallSlidesITVY  2x10 2x10     Ball Wall Rolls  30x 30x     DORIAN Trejo  15' 15'             Modalities 3/11 3/16 3/18      / PE / ES 21' 20' 15'     US

## 2021-03-19 LAB — HCV AB SER-ACNC: NEGATIVE

## 2021-03-22 ENCOUNTER — OFFICE VISIT (OUTPATIENT)
Dept: PHYSICAL THERAPY | Facility: CLINIC | Age: 66
End: 2021-03-22
Payer: COMMERCIAL

## 2021-03-22 DIAGNOSIS — M75.82 TENDINITIS OF LEFT ROTATOR CUFF: ICD-10-CM

## 2021-03-22 DIAGNOSIS — M25.512 CHRONIC LEFT SHOULDER PAIN: Primary | ICD-10-CM

## 2021-03-22 DIAGNOSIS — G89.29 CHRONIC LEFT SHOULDER PAIN: Primary | ICD-10-CM

## 2021-03-22 PROCEDURE — 97014 ELECTRIC STIMULATION THERAPY: CPT

## 2021-03-22 PROCEDURE — 97164 PT RE-EVAL EST PLAN CARE: CPT | Performed by: PHYSICAL THERAPIST

## 2021-03-22 PROCEDURE — 97140 MANUAL THERAPY 1/> REGIONS: CPT

## 2021-03-22 PROCEDURE — 97112 NEUROMUSCULAR REEDUCATION: CPT

## 2021-03-22 NOTE — PROGRESS NOTES
Today's date: 3/22/2021  Patient name: Jose Guerrero  : 1955  MRN: [id number]  Referring provider: Seth Ledesma DO  Dx:   Encounter Diagnosis     ICD-10-CM    1  Chronic left shoulder pain  M25 512     G89 29    2  Tendinitis of left rotator cuff  M75 82      Subjective:  No new c/o pain today  Objective: See treatment log below  Lakshmi Angela continues to be advised to follow his home exercise program as tolerated  When Lakshmi Angela is feeling good he follows his rehab exercises in the gym and on other days he follows his home exercise program at home as tolerated  In the future we plan on having Dawson stay at home and follow his home exercise program for four to six weeks and than assess for either more Rehab treatments or discharge from Rehab care  Assessment: Tolerated treatment well  Patient exhibited good technique with therapeutic exercises and would benefit from continued PT  Dawson's goals are to continue to improve with his rehab program and improve with functional mobility, speed, repetition and decreased c/o pain with his gym and home exercise program   Dawson's final goal for his rehab is to be discharged from Rehab care after obtaining his full functional rehab potential     Plan: Continue per plan of care  Progress treatment as tolerated  Precautions:  Left Shoulder Rotator Cuff Issues  All treatments below will be provided with a focus on strengthening, flexibility, ROM, postural,   endurance and any possible swelling and pain which may be present without ignoring   neural issues involving balance, coordination and proprioception which is also important   and necessary to provide full functional mobility and quality care        Daily Treatment Log  Manual  3/11 3/16 3/18 3/22    MT, ROM 20' 30' 30' 30'    HEP        Exercise Log  3/16 3/18 3/22    UBC  10' 10' 10'    FWC-Codmans        FWC-Cureri,Tri        Power Web        Digiflex        Finger Ladder        [place]-BP,PD,Lats,Row NuStep        W/P-PNF,IR,ER,PU,PS,Throw-Top,Mid,Bot        W/P-OH        Rotation Bar Sup/Pro        WallSlidesITVY  2x10 2x10     Ball Wall Rolls  30x 30x     [place], PE  15' 15' 15'            Modalities 3/11 3/16 3/18 3/22    MH / PE / ES 21' 20' 15' 20'    US

## 2021-03-22 NOTE — LETTER
2021  PT Reevaluation [address] , DO  [address] [facility]  [address]  [address] [facility]    Patient: Renetta Freeman   YOB: 1955   Date of Visit: 3/22/2021     Encounter Diagnosis     ICD-10-CM    1  Chronic left shoulder pain  M25 512     G89 29    2  Tendinitis of left rotator cuff  M75 82        Dear Dr Mariela Watts:    Thank you for your recent referral of Renetta Freeman  Please review the attached evaluation summary from Dawson's recent visit  Please verify that you agree with the plan of care by signing the attached order  If you have any questions or concerns, please do not hesitate to call  I sincerely appreciate the opportunity to share in the care of one of your patients and hope to have another opportunity to work with you in the near future  Sincerely,    [place], PT      Referring Provider:      I certify that I have read the below Plan of Care and certify the need for these services furnished under this plan of treatment while under my care  Marilynn Ragsdale, DO  [address]  [place] [address]  [place] [place]          Today's date: 3/22/2021  Patient name: Renetta Freeman  : 1955  MRN: [id number]  Referring provider: Erica Rodriguez DO  Dx:   Encounter Diagnosis     ICD-10-CM    1  Chronic left shoulder pain  M25 512     G89 29    2  Tendinitis of left rotator cuff  M75 82      Subjective:  No new c/o pain today  Objective: See treatment log below  Ora Carvajal continues to be advised to follow his home exercise program as tolerated  When Ora Carvajal is feeling good he follows his rehab exercises in the gym and on other days he follows his home exercise program at home as tolerated  In the future we plan on having Dawson stay at home and follow his home exercise program for four to six weeks and than assess for either more Rehab treatments or discharge from Rehab care      Assessment: Tolerated treatment well  Patient exhibited good technique with therapeutic exercises and would benefit from continued PT  Dawson's goals are to continue to improve with his rehab program and improve with functional mobility, speed, repetition and decreased c/o pain with his gym and home exercise program   Dawson's final goal for his rehab is to be discharged from Rehab care after obtaining his full functional rehab potential     Plan: Continue per plan of care  Progress treatment as tolerated  Precautions:  Left Shoulder Rotator Cuff Issues  All treatments below will be provided with a focus on strengthening, flexibility, ROM, postural,   endurance and any possible swelling and pain which may be present without ignoring   neural issues involving balance, coordination and proprioception which is also important   and necessary to provide full functional mobility and quality care  Daily Treatment Log  Manual  3/11 3/16 3/18 3/22    MT, ROM 20' 30' 30' 30'    HEP        Exercise Log  3/16 3/18 3/22    UBC  10' 10' 10'    FWC-Codmans        FWC-Curls,Tri        Power Web        Digiflex        Finger Ladder        [place]-BP,PD,Lats,Row        NuStep        W/P-PNF,IR,ER,PU,PS,Throw-Top,Mid,Bot        W/P-OH        Rotation Bar Sup/Pro        WallSlidesITVY  2x10 2x10     E  I  [place]  30x 30x     [place], PE  15' 15' 15'            Modalities 3/11 3/16 3/18 3/22    MH / PE / ES 21' 20' 15' 20'    US            Attestation signed by Sophia Fernandes PT at 3/22/2021  5:09 PM:  I supervised the visit  We discussed the case to ensure appropriate continuation and progression of care and I reviewed the documentation  PT Re-Evaluation   Today's date: 3/22/2021    Patient name: Jose Guerrero  : 1955  MRN: [id number]  Referring provider: Seth Ledesma DO  Dx:   Encounter Diagnosis     ICD-10-CM    1  Chronic left shoulder pain  M25 512     G89 29    2   Tendinitis of left rotator cuff  M75 82 Assessment  Assessment details: Patient has decreased c/o pain and increased function  Patient can elevate his arm a little better and is able to lie on his shoulder a little better  He is feeling better  Still limited but improving  Impairments: abnormal or restricted ROM, abnormal movement, activity intolerance, impaired physical strength, pain with function and safety issue  Understanding of Dx/Px/POC: excellent   Prognosis: good    Goals  STG 2-4 weeks:   Increase UE strength by 3-6 lbs  Decrease pain by 1-2 levels on 1-10 pain scale  Increase UE PROM by 10-15 Degrees in all planes  Reduce C/O pain with lying on either side  Initiate HEP  LTG 6-8 weeks:   Increase UE strength 10-20 lbs  Demonstrate UE AROM WFL in all planes  Decrease pain to <2  Improve strength by 10-20 lbs  Return to lying on their right or left side with minimal difficulty  Improve sleep status limitations to none  D/C with HEP  Plan  Plan details: All planned modality interventions and planned therapy interventions are provided PRN    Patient would benefit from: PT eval and skilled physical therapy  Planned modality interventions: ultrasound, unattended electrical stimulation and TENS  Planned therapy interventions: joint mobilization, manual therapy, neuromuscular re-education, patient education, postural training, self care, coordination, strengthening, stretching, therapeutic activities, therapeutic exercise, therapeutic training, home exercise program, graded exercise and flexibility  Frequency: 3x week  Duration in weeks: 12  Treatment plan discussed with: patient      Subjective Evaluation    Pain  Quality: discomfort, knife-like, pulling, squeezing, sharp, tight and throbbing  Relieving factors: support, rest, relaxation and change in position  Aggravating factors: lifting, overhead activity and keyboarding  Progression: improved    Treatments  Previous treatment: physical therapy  Current treatment: physical therapy  Patient Goals  Patient goals for therapy: decreased pain, increased motion, return to work, return to [employer] activities, independence with ADLs/IADLs and increased strength      Date of onset:  12/14/2020    Date of Surgery:  None    History of Present Episode: 2/22/2021  Salma Berry states his left shoulder had been bothering him for several months  He slipped and fell about three months ago and his left shoulder really flared up  He does not know why his left shoulder started to flare up many months before he fell but falling really flared up his left shoulder  Past Medical History:    2/22/2021  Salma Berry reports diabetes type two  Heart stint  Previous Level of Functional Ability:  2/22/2021  Salma Berry states he had no limitations with his left shoulder  He worked without difficulties or restrictions  Inspection / Palpation:  Shoulder:  2/22/2021  Mesomorphic body type  No signs of infection  No signs of wounds  No signs of drainage  No signs of ecchymotic regions  No signs of erythremic regions  Mild to moderate signs of muscle spasm  Mild to moderate signs of muscle guarding  Mild to moderate signs of tenderness reported to palpation  Mild signs of atrophy noted  No signs of swelling  No signs of a surgery site  Current conditions appears consistent with recent acute episode  Chief Complaints:  2/22/2021  Salma Berry reports moderate difficulty with bending his left shoulder  Felibertochioma Berry reports moderate difficulty with movement of his left shoulder  Salma Berry reports moderate difficulty with use of his left arm  Salma Berry reports moderate difficulty with lifting / elevating his left arm  Felibertochioma Berry reports moderate difficulty with sleeping  Felibertochioma Berry reports moderate difficulty with his strength and endurance  Felibertochioma Berry reports moderate limitations with his left shoulder range of motion  Felibertochioma Berry reports moderate difficulty lying on his left shoulder region      SHOULDER PAIN Resting Palpation Moving Lifting Elevating   2/22/2021 Rt 0 0 0 0 0   2/22/2021 Lt  0 0-4 0-4 0-4 4-6   3/22/2021 Lt 0 0-3 0-3 0-3 3-5     SHOULDER PAIN Sleeping Throwing Pushing Pulling Twisting   2/22/2021 Rt 0 0 0 0 0   2/22/2021 Lt  4-6 4-6 0-4 0-4 0-4   3/22/2021 Lt 3-5 3-5 0-3 0-3 0-3     SHOULDER AROM Flexion Extension Abduction   2/22/2021 Rt 185° 65° 185°   2/22/2021 Lt 175° 55° 175°   3/22/2021 Lt 179° 59° 179°     SHOULDER AROM Hor Add Ext Rotation Int Rotation   2/22/2021 Rt 75° 95° 95°   2/22/2021 Lt 65° 82° 95°   3/22/2021 Lt 69° 87° 95°     SHLD MMT / PAIN Flexion Extension Abduction   2/22/2021 Rt 0/10  49 lbs 0/10  48 lbs 0/10  47 lbs   2/22/2021 Lt 0/10  35 lbs 0/10  32 lbs 0/10  35 lbs   3/22/2021 Lt 0/10  39 lbs 0/10  36 lbs 0/10  39 lbs     SHLD MMT / PAIN Hor Add Ext Rotation Int Rotation   2/22/2021 Rt 0/10  41 lbs 0/10  32 lbs 0/10  33 lbs   2/22/2021 Lt 0/10  33 lbs 0/10  21 lbs 0/10  22 lbs   3/22/2021 Lt 0/10  36 lbs 0/10  25 lbs 0/10  26 lbs     Shoulder Screen Rotator Cuff Apprehension Anterior  Stability Posterior  Stability   2/22/2021 Rt Negative Negative Negative Negative   2/22/2021 Lt POSITIVE Negative Negative Negative     Shoulder Screen AC Joint SC Joint Drop Arm Adhesive Capsulitis   2/22/2021 Rt Negative Negative Negative Negative   2/22/2021 Lt Negative Negative POSITIVE Negative     Precautions:  Left Shoulder Rotator Cuff Issues

## 2021-03-23 ENCOUNTER — APPOINTMENT (OUTPATIENT)
Dept: PHYSICAL THERAPY | Facility: CLINIC | Age: 66
End: 2021-03-23
Payer: COMMERCIAL

## 2021-03-23 LAB — HBA1C MFR BLD HPLC: 6.7 %

## 2021-03-23 NOTE — PROGRESS NOTES
PT Re-Evaluation   Today's date: 3/22/2021    Patient name: Jaelyn Oh  : 1955  MRN: [id number]  Referring provider: Smita Palomo DO  Dx:   Encounter Diagnosis     ICD-10-CM    1  Chronic left shoulder pain  M25 512     G89 29    2  Tendinitis of left rotator cuff  M75 82      Assessment  Assessment details: Patient has decreased c/o pain and increased function  Patient can elevate his arm a little better and is able to lie on his shoulder a little better  He is feeling better  Still limited but improving  Impairments: abnormal or restricted ROM, abnormal movement, activity intolerance, impaired physical strength, pain with function and safety issue  Understanding of Dx/Px/POC: excellent   Prognosis: good    Goals  STG 2-4 weeks:   Increase UE strength by 3-6 lbs  Decrease pain by 1-2 levels on 1-10 pain scale  Increase UE PROM by 10-15 Degrees in all planes  Reduce C/O pain with lying on either side  Initiate HEP  LTG 6-8 weeks:   Increase UE strength 10-20 lbs  Demonstrate UE AROM WFL in all planes  Decrease pain to <2  Improve strength by 10-20 lbs  Return to lying on their right or left side with minimal difficulty  Improve sleep status limitations to none  D/C with HEP  Plan  Plan details: All planned modality interventions and planned therapy interventions are provided PRN    Patient would benefit from: PT eval and skilled physical therapy  Planned modality interventions: ultrasound, unattended electrical stimulation and TENS  Planned therapy interventions: joint mobilization, manual therapy, neuromuscular re-education, patient education, postural training, self care, coordination, strengthening, stretching, therapeutic activities, therapeutic exercise, therapeutic training, home exercise program, graded exercise and flexibility  Frequency: 3x week  Duration in weeks: 12  Treatment plan discussed with: patient      Subjective Evaluation    Pain  Quality: discomfort, knife-like, pulling, squeezing, sharp, tight and throbbing  Relieving factors: support, rest, relaxation and change in position  Aggravating factors: lifting, overhead activity and keyboarding  Progression: improved    Treatments  Previous treatment: physical therapy  Current treatment: physical therapy  Patient Goals  Patient goals for therapy: decreased pain, increased motion, return to work, return to [employer] activities, independence with ADLs/IADLs and increased strength      Date of onset:  12/14/2020    Date of Surgery:  None    History of Present Episode: 2/22/2021  Belinda Hunt states his left shoulder had been bothering him for several months  He slipped and fell about three months ago and his left shoulder really flared up  He does not know why his left shoulder started to flare up many months before he fell but falling really flared up his left shoulder  Past Medical History:    2/22/2021  Belinda Hunt reports diabetes type two  Heart stint  Previous Level of Functional Ability:  2/22/2021  Belinda Hunt states he had no limitations with his left shoulder  He worked without difficulties or restrictions  Inspection / Palpation:  Shoulder:  2/22/2021  Mesomorphic body type  No signs of infection  No signs of wounds  No signs of drainage  No signs of ecchymotic regions  No signs of erythremic regions  Mild to moderate signs of muscle spasm  Mild to moderate signs of muscle guarding  Mild to moderate signs of tenderness reported to palpation  Mild signs of atrophy noted  No signs of swelling  No signs of a surgery site  Current conditions appears consistent with recent acute episode  Chief Complaints:  2/22/2021  Belinda Hunt reports moderate difficulty with bending his left shoulder  Belinda Hunt reports moderate difficulty with movement of his left shoulder  Belinda Hunt reports moderate difficulty with use of his left arm  Belinda Hunt reports moderate difficulty with lifting / elevating his left arm    Belinda Hunt reports moderate difficulty with sleeping  Ion Orellana reports moderate difficulty with his strength and endurance  Ion Orellana reports moderate limitations with his left shoulder range of motion  Ion Orellana reports moderate difficulty lying on his left shoulder region  SHOULDER PAIN Resting Palpation Moving Lifting Elevating   2/22/2021 Rt 0 0 0 0 0   2/22/2021 Lt  0 0-4 0-4 0-4 4-6   3/22/2021 Lt 0 0-3 0-3 0-3 3-5     SHOULDER PAIN Sleeping Throwing Pushing Pulling Twisting   2/22/2021 Rt 0 0 0 0 0   2/22/2021 Lt  4-6 4-6 0-4 0-4 0-4   3/22/2021 Lt 3-5 3-5 0-3 0-3 0-3     SHOULDER AROM Flexion Extension Abduction   2/22/2021 Rt 185° 65° 185°   2/22/2021 Lt 175° 55° 175°   3/22/2021 Lt 179° 59° 179°     SHOULDER AROM Hor Add Ext Rotation Int Rotation   2/22/2021 Rt 75° 95° 95°   2/22/2021 Lt 65° 82° 95°   3/22/2021 Lt 69° 87° 95°     SHLD MMT / PAIN Flexion Extension Abduction   2/22/2021 Rt 0/10  49 lbs 0/10  48 lbs 0/10  47 lbs   2/22/2021 Lt 0/10  35 lbs 0/10  32 lbs 0/10  35 lbs   3/22/2021 Lt 0/10  39 lbs 0/10  36 lbs 0/10  39 lbs     SHLD MMT / PAIN Hor Add Ext Rotation Int Rotation   2/22/2021 Rt 0/10  41 lbs 0/10  32 lbs 0/10  33 lbs   2/22/2021 Lt 0/10  33 lbs 0/10  21 lbs 0/10  22 lbs   3/22/2021 Lt 0/10  36 lbs 0/10  25 lbs 0/10  26 lbs     Shoulder Screen Rotator Cuff Apprehension Anterior  Stability Posterior  Stability   2/22/2021 Rt Negative Negative Negative Negative   2/22/2021 Lt POSITIVE Negative Negative Negative     Shoulder Screen AC Joint SC Joint Drop Arm Adhesive Capsulitis   2/22/2021 Rt Negative Negative Negative Negative   2/22/2021 Lt Negative Negative POSITIVE Negative     Precautions:  Left Shoulder Rotator Cuff Issues

## 2021-03-25 ENCOUNTER — OFFICE VISIT (OUTPATIENT)
Dept: PHYSICAL THERAPY | Facility: CLINIC | Age: 66
End: 2021-03-25
Payer: COMMERCIAL

## 2021-03-25 DIAGNOSIS — M25.512 CHRONIC LEFT SHOULDER PAIN: Primary | ICD-10-CM

## 2021-03-25 DIAGNOSIS — G89.29 CHRONIC LEFT SHOULDER PAIN: Primary | ICD-10-CM

## 2021-03-25 DIAGNOSIS — M75.82 TENDINITIS OF LEFT ROTATOR CUFF: ICD-10-CM

## 2021-03-25 PROCEDURE — 97112 NEUROMUSCULAR REEDUCATION: CPT

## 2021-03-25 PROCEDURE — 97110 THERAPEUTIC EXERCISES: CPT

## 2021-03-25 PROCEDURE — 97140 MANUAL THERAPY 1/> REGIONS: CPT

## 2021-03-25 PROCEDURE — 97032 APPL MODALITY 1+ESTIM EA 15: CPT

## 2021-03-25 NOTE — PROGRESS NOTES
Daily Note     Today's date: 3/25/2021  Patient name: Verito Taylor  : 1955  MRN: [id number]  Referring provider: Eva Perales DO  Dx:   Encounter Diagnosis     ICD-10-CM    1  Chronic left shoulder pain  M25 512     G89 29    2  Tendinitis of left rotator cuff  M75 82                   Subjective: Patient noted pain in L shoulder  Objective: See treatment diary below      Assessment: Tolerated treatment well  STM helped to decrease tightness in L shoulder UT and anterior shoulder  Patient exhibited good technique with therapeutic exercises and would benefit from continued PT      Plan: Continue per plan of care        Precautions:  Left Shoulder Rotator Cuff Issues      All treatments below will be provided with a focus on strengthening, flexibility, ROM, postural,   endurance and any possible swelling and pain which may be present without ignoring   neural issues involving balance, coordination and proprioception which is also important   and necessary to provide full functional mobility and quality care        Daily Treatment Log  Manual  3/11 3/16 3/18 3/22  3/25   MT, ROM 20' 30' 30' 30'  30'   HEP             Exercise Log   3/16 3/18 3/22  3/25   UBC   10' 10' 10'  10'   FWC-Codmans             FWC-Curls,Tri             Power Web             Digiflex             Finger Ladder             [place]-BP,PD,Lats,Row             NuStep          10' L3   W/P-PNF,IR,ER,PU,PS,Throw-Top,Mid,Bot             W/P-OH             Rotation Bar Sup/Pro             WallSlidesITVY   2x10 2x10    2x10   Ball Wall Rolls   30x 30x  30x   ME, PE   15' 15' 15'  15'                 Modalities 3/11 3/16 3/18 3/22     MH / PE / ES 20' 20' 15' 20'     US

## 2021-03-29 ENCOUNTER — OFFICE VISIT (OUTPATIENT)
Dept: PHYSICAL THERAPY | Facility: CLINIC | Age: 66
End: 2021-03-29
Payer: COMMERCIAL

## 2021-03-29 DIAGNOSIS — G89.29 CHRONIC LEFT SHOULDER PAIN: Primary | ICD-10-CM

## 2021-03-29 DIAGNOSIS — M25.512 CHRONIC LEFT SHOULDER PAIN: Primary | ICD-10-CM

## 2021-03-29 DIAGNOSIS — M75.82 TENDINITIS OF LEFT ROTATOR CUFF: ICD-10-CM

## 2021-03-29 PROCEDURE — 97140 MANUAL THERAPY 1/> REGIONS: CPT | Performed by: PHYSICAL THERAPIST

## 2021-03-29 PROCEDURE — 97112 NEUROMUSCULAR REEDUCATION: CPT | Performed by: PHYSICAL THERAPIST

## 2021-03-29 PROCEDURE — 97110 THERAPEUTIC EXERCISES: CPT | Performed by: PHYSICAL THERAPIST

## 2021-03-29 NOTE — PROGRESS NOTES
Daily Note     Today's date: 3/29/2021  Patient name: Klarissa Choudhary  : 1955  MRN: [id number]  Referring provider: Lorie Silvestre DO  Dx:   Encounter Diagnosis     ICD-10-CM    1  Chronic left shoulder pain  M25 512     G89 29    2  Tendinitis of left rotator cuff  M75 82                   Subjective: Patient states his left shoulder is sore today  He tries to be careful at work but working can irritate his arm  Objective: See treatment diary below      Assessment: Tolerated treatment well  Patient exhibited good technique with therapeutic exercises and would benefit from continued PT      Plan: Continue per plan of care  Progress treatment as tolerated         Precautions:  Left Shoulder Rotator Cuff Issues      All treatments below will be provided with a focus on strengthening, flexibility, ROM, postural,   endurance and any possible swelling and pain which may be present without ignoring   neural issues involving balance, coordination and proprioception which is also important   and necessary to provide full functional mobility and quality care        Daily Treatment Log  Manual  3/29  3/18 3/22  3/25   MT, ROM 25'  30' 30'  30'   HEP             Exercise Log 3/29   3/18 3/22  3/25   UBC  10'  10' 10'  10'   FWC-Codmans             FWC-Curls,Tri             Power Web             Digiflex             Finger Ladder             [place]-BP,PD,Lats,Row             NuStep  10' L3        10' L3   W/P-PNF,IR,ER,PU,PS,Throw-Top,Mid,Bot             W/P-OH             Rotation Bar Sup/Pro             WallSlidesITVY  2x10  2x10   [address] [facility],[address] Rolls  30x  30x  30x   [place], PE  15'  15' 15'  15'                 Modalities 3/29  3/18 3/22     MH / PE / ES 20' 20' 15' 20'

## 2021-03-30 ENCOUNTER — APPOINTMENT (OUTPATIENT)
Dept: PHYSICAL THERAPY | Facility: CLINIC | Age: 66
End: 2021-03-30
Payer: COMMERCIAL

## 2021-04-07 ENCOUNTER — OFFICE VISIT (OUTPATIENT)
Dept: OBGYN CLINIC | Facility: CLINIC | Age: 66
End: 2021-04-07
Payer: COMMERCIAL

## 2021-04-07 VITALS
HEART RATE: 86 BPM | HEIGHT: 67 IN | DIASTOLIC BLOOD PRESSURE: 76 MMHG | WEIGHT: 200 LBS | SYSTOLIC BLOOD PRESSURE: 118 MMHG | BODY MASS INDEX: 31.39 KG/M2 | TEMPERATURE: 98 F

## 2021-04-07 DIAGNOSIS — M75.82 TENDINITIS OF LEFT ROTATOR CUFF: Primary | ICD-10-CM

## 2021-04-07 DIAGNOSIS — M25.512 CHRONIC LEFT SHOULDER PAIN: ICD-10-CM

## 2021-04-07 DIAGNOSIS — G89.29 CHRONIC LEFT SHOULDER PAIN: ICD-10-CM

## 2021-04-07 PROCEDURE — 99213 OFFICE O/P EST LOW 20 MIN: CPT | Performed by: ORTHOPAEDIC SURGERY

## 2021-04-07 NOTE — PROGRESS NOTES
ASSESSMENT/PLAN:    Diagnoses and all orders for this visit:    Tendinitis of left rotator cuff  -     MRI shoulder left wo contrast; Future    Chronic left shoulder pain        Treatment options were discussed with the patient  He was offered either continuing physical therapy and OTC medications or  Obtaining an MRI to check for any rotator cuff tears  At this point, the patient would like to the obtain an MRI  I think this is reasonable as he has been doing PT since February 2021  We will see him back for MRI review when it's available  He was encouraged to contact the office should questions or concerns arise  Return for MRI review when available       _____________________________________________________  CHIEF COMPLAINT:  Chief Complaint   Patient presents with    Follow-up         SUBJECTIVE:  Alley Pérez is a 61 y o  male who presents for follow up  Of left shoulder pain  The patient reports no significant changes in his symptoms since he was last seen  He has been taking Tylenol p m  at night to help him sleep  He will occasionally take Advil for significant pain  He has continued to go to physical therapy  He continues to complain of pain both anterior and posteriorly in the shoulder  The location varies depending on the activity that he is doing  He denies new falls or trauma  He denies numbness or tingling in the left upper extremity  He is left-hand dominant but does use his right hand when driving        PAST MEDICAL HISTORY:  Past Medical History:   Diagnosis Date    Diabetes mellitus [address]     [address]     High cholesterol     Hypertension        PAST SURGICAL HISTORY:  Past Surgical History:   Procedure Laterality Date    CHOLECYSTECTOMY      VASECTOMY      VASECTOMY REVERSAL         FAMILY HISTORY:  Family History   Problem Relation Age of Onset    Dementia Mother     No Known Problems Father        SOCIAL HISTORY:  Social History     Tobacco Use    Smoking status: Former Smoker     Quit date: 2015     Years since quittin 1    Smokeless tobacco: Never Used   Substance Use Topics    Alcohol use: Yes     Comment: occasionally    Drug use: Never       MEDICATIONS:    Current Outpatient Medications:     aspirin (Aspirin Adult Low Strength) 81 mg EC tablet, , Disp: , Rfl:     atorvastatin (LIPITOR) 80 mg tablet, , Disp: , Rfl:     carvedilol (COREG) 25 mg tablet, , Disp: , Rfl:     citalopram (CeleXA) 10 mg tablet, , Disp: , Rfl:     Coenzyme Q10 (CoQ-10) 100 MG CAPS, , Disp: , Rfl:     esomeprazole (NexIUM) 5 MG packet, Take by mouth, Disp: , Rfl:     Farxiga 10 MG TABS, TAKE 10 MG BY MOUTH DAILY  , Disp: , Rfl:     hydrochlorothiazide (HYDRODIURIL) 25 mg tablet, , Disp: , Rfl:     Multiple Vitamins-Minerals (PreserVision AREDS 2) CAPS, , Disp: , Rfl:     Ozempic, 1 MG/DOSE, 2 MG/1 5ML SOPN, , Disp: , Rfl:     ramipril (ALTACE) 10 MG capsule, , Disp: , Rfl:     triamcinolone (KENALOG) 0 5 % cream, Apply 1 application topically 2 (two) times a day To affected area, Disp: , Rfl:     naproxen (NAPROSYN) 500 mg tablet, Take 1 tablet (500 mg total) by mouth 2 (two) times a day with meals (Patient not taking: Reported on 3/3/2021), Disp: 60 tablet, Rfl: 1    ALLERGIES:  Allergies   Allergen Reactions    Food Allergy [Soybean Oil - Food Allergy] Anaphylaxis     Organic soy       REVIEW OF SYSTEMS:  Pertinent items are noted in HPI  A comprehensive review of systems was negative       _____________________________________________________  PHYSICAL EXAMINATION:  General: well developed and well nourished, alert, oriented times 3 and appears comfortable  Psychiatric: Normal  HEENT:  Normocephalic, atraumatic  Cardiovascular:  Regular  Pulmonary: No wheezing or stridor  Skin: No masses, erthema, lacerations, fluctation, ulcerations  Neurovascular: Motor and sensory exams are grossly intact, 2+ radial pulse    Limb is warm well perfused with good color and capillary refill of the digits  MUSCULOSKELETAL EXAMINATION:      The left shoulder exam demonstrates skin intact, no erythema, no ecchymosis, no significant swelling  He has no significant tenderness to palpation  he has full active and passive range of motion of the shoulder but does complain of pain with forward flexion abduction, and  External rotation  5/5 strength with resisted  Internal rotation  4+/5 strength with resisted external rotation and forward flexion  Biceps provocation testing is benign  Magui's test does elicit complaints of pain  In the posterior aspect of the shoulder  The remainder of the left upper extremity exam is benign  _____________________________________________________  STUDIES REVIEWED:  No new imaging performed today    PROCEDURES PERFORMED:   Procedures  None performed today            Leah Adams PA-C

## 2021-04-15 ENCOUNTER — HOSPITAL ENCOUNTER (OUTPATIENT)
Dept: MRI IMAGING | Facility: HOSPITAL | Age: 66
Discharge: HOME | End: 2021-04-15
Payer: COMMERCIAL

## 2021-04-15 DIAGNOSIS — M75.82 TENDINITIS OF LEFT ROTATOR CUFF: ICD-10-CM

## 2021-04-15 PROCEDURE — 73221 MRI JOINT UPR EXTREM W/O DYE: CPT

## 2021-04-15 PROCEDURE — G1004 CDSM NDSC: HCPCS

## 2021-04-15 NOTE — PROGRESS NOTES
PT Discharge  Today's date: 4/15/2021  Patient name: Caridad Paulino  : 1955  MRN: [id number]  Referring provider: Cordelia Gillespie DO  Dx:   Encounter Diagnosis     ICD-10-CM    1  Chronic left shoulder pain  M25 512     G89 29    2  Tendinitis of left rotator cuff  M75 82      Assessment/Plan    Subjective    Objective     4/15/2021  Caridad Paulino has not returned for more treatment  Caridad Paulino did not attend today's appointment  I cannot provide you with a current progress report but I can provide you with information based on previous performance  Caridad Paulino is discharged at this time

## 2021-04-21 ENCOUNTER — OFFICE VISIT (OUTPATIENT)
Dept: OBGYN CLINIC | Facility: CLINIC | Age: 66
End: 2021-04-21
Payer: COMMERCIAL

## 2021-04-21 VITALS
TEMPERATURE: 96.9 F | HEIGHT: 67 IN | BODY MASS INDEX: 31.39 KG/M2 | DIASTOLIC BLOOD PRESSURE: 80 MMHG | WEIGHT: 200 LBS | SYSTOLIC BLOOD PRESSURE: 132 MMHG | HEART RATE: 82 BPM

## 2021-04-21 DIAGNOSIS — S43.421D SPRAIN OF RIGHT ROTATOR CUFF CAPSULE, SUBSEQUENT ENCOUNTER: ICD-10-CM

## 2021-04-21 DIAGNOSIS — M25.512 CHRONIC LEFT SHOULDER PAIN: Primary | ICD-10-CM

## 2021-04-21 DIAGNOSIS — G89.29 CHRONIC LEFT SHOULDER PAIN: Primary | ICD-10-CM

## 2021-04-21 PROCEDURE — 1036F TOBACCO NON-USER: CPT | Performed by: ORTHOPAEDIC SURGERY

## 2021-04-21 PROCEDURE — 99214 OFFICE O/P EST MOD 30 MIN: CPT | Performed by: ORTHOPAEDIC SURGERY

## 2021-04-21 RX ORDER — ACETAMINOPHEN 325 MG/1
975 TABLET ORAL ONCE
Status: CANCELLED | OUTPATIENT
Start: 2021-04-21 | End: 2021-04-21

## 2021-04-21 RX ORDER — CHLORHEXIDINE GLUCONATE 0.12 MG/ML
15 RINSE ORAL ONCE
Status: CANCELLED | OUTPATIENT
Start: 2021-04-21 | End: 2021-04-21

## 2021-04-21 NOTE — H&P (VIEW-ONLY)
ASSESSMENT/PLAN:    Diagnoses and all orders for this visit:    Chronic left shoulder pain    Sprain of right rotator cuff capsule, subsequent encounter        Plan: Treatment options were discussed including continuing conservative care with further  Physical therapy sessions or proceeding to surgical intervention  After a thorough discussion, he elected to proceed with surgery  He was offered both the option of a an open rotator cuff repair, if necessary, verses arthroscopic surgery which would require referral to 1 of the shoulder surgeons  He elected to proceed with surgery by me, therefore an open rotator cuff repair if necessary  The risks, benefits, options and alternatives of treatment were discussed, including but not limited to: Infection, blood loss, DVT/PE, poor wound healing, anesthetic risks, loss of motion, nerve/blood vessel damage, persistent/recurrent symptoms   I will see him 1 week postoperatively  He has plans and will be out of town at the end of April and would like to wait until the early part of May to proceed  He will need preoperative medical clearance and preoperative testing  Return for   One week postoperatively  _____________________________________________________  CHIEF COMPLAINT:  Chief Complaint   Patient presents with    Follow-up         SUBJECTIVE:  Kieran Altman is a 61y o  year old male who presents for follow up  Of his left shoulder symptoms  He denies any change in symptoms since he was last seen  His MRI has been obtained and is available for review  He continues to complain of pain with overhead activities as well as with reaching behind him  He denies paresthesias        PAST MEDICAL HISTORY:  Past Medical History:   Diagnosis Date    [facility] [address] [facility]     [facility]     High cholesterol     Hypertension        PAST SURGICAL HISTORY:  Past Surgical History:   Procedure Laterality Date    CHOLECYSTECTOMY      VASECTOMY      VASECTOMY REVERSAL         FAMILY HISTORY:  Family History   Problem Relation Age of Onset    Dementia Mother     No Known Problems Father        SOCIAL HISTORY:  Social History     Tobacco Use    Smoking status: Former Smoker     Quit date: 2015     Years since quittin 1    Smokeless tobacco: Never Used   Substance Use Topics    Alcohol use: Yes     Comment: occasionally    Drug use: Never       MEDICATIONS:    Current Outpatient Medications:     aspirin (Aspirin Adult Low Strength) 81 mg EC tablet, , Disp: , Rfl:     atorvastatin (LIPITOR) 80 mg tablet, , Disp: , Rfl:     carvedilol (COREG) 25 mg tablet, , Disp: , Rfl:     citalopram (CeleXA) 10 mg tablet, , Disp: , Rfl:     Coenzyme Q10 (CoQ-10) 100 MG CAPS, , Disp: , Rfl:     esomeprazole (NexIUM) 5 MG packet, Take by mouth, Disp: , Rfl:     Farxiga 10 MG TABS, TAKE 10 MG BY MOUTH DAILY  , Disp: , Rfl:     hydrochlorothiazide (HYDRODIURIL) 25 mg tablet, , Disp: , Rfl:     Multiple Vitamins-Minerals (PreserVision AREDS 2) CAPS, , Disp: , Rfl:     Ozempic, 1 MG/DOSE, 2 MG/1 5ML SOPN, , Disp: , Rfl:     ramipril (ALTACE) 10 MG capsule, , Disp: , Rfl:     triamcinolone (KENALOG) 0 5 % cream, Apply 1 application topically 2 (two) times a day To affected area, Disp: , Rfl:     naproxen (NAPROSYN) 500 mg tablet, Take 1 tablet (500 mg total) by mouth 2 (two) times a day with meals (Patient not taking: Reported on 3/3/2021), Disp: 60 tablet, Rfl: 1    ALLERGIES:  Allergies   Allergen Reactions    Food Allergy [Soybean Oil - Food Allergy] Anaphylaxis     Organic soy       REVIEW OF SYSTEMS:  Pertinent items are noted in HPI    A comprehensive review of systems was negative       _____________________________________________________  PHYSICAL EXAMINATION:  General: alert, oriented times 3 and appears comfortable  Psychiatric: Normal  HEENT:  Normocephalic, atraumatic  Cardiovascular:  Regular  Pulmonary: No wheezing or stridor  Skin: No masses, erthema, lacerations, fluctation, ulcerations  Neurovascular: Motor and sensory exams are intact including excellent strength of the left shoulder excluding some mild weakness secondary to pain with resisted internal rotation  Pulses are palpable  MUSCULOSKELETAL EXAMINATION:      The left shoulder exam demonstrates good range of motion  He does lack some abduction/ IR compared to the contralateral right side  Colusa's and Magui's test elicited complaints of mild pain over the lateral shoulder  The acromioclavicular joint is nontender  The rotator cuff insertion is minimally tender  Biceps and anterior shoulder nontender  He has no evidence of instability  The remainder of the  Upper extremity examination bilaterally is benign  _____________________________________________________  STUDIES REVIEWED:    The MRI of his shoulder demonstrates rotator cuff tendinitis/ tendinosis with a area of probable tear at the anterior portion  There is some tendinitis noted at the subscapularis  Some mild degenerative changes are noted at the glenohumeral joint with more significant degenerative changes noted at the acromioclavicular joint  The MRI report was reviewed          Fidencio Barrett

## 2021-04-21 NOTE — PROGRESS NOTES
ASSESSMENT/PLAN:    Diagnoses and all orders for this visit:    Chronic left shoulder pain    Sprain of right rotator cuff capsule, subsequent encounter        Plan: Treatment options were discussed including continuing conservative care with further  Physical therapy sessions or proceeding to surgical intervention  After a thorough discussion, he elected to proceed with surgery  He was offered both the option of a an open rotator cuff repair, if necessary, verses arthroscopic surgery which would require referral to 1 of the shoulder surgeons  He elected to proceed with surgery by me, therefore an open rotator cuff repair if necessary  The risks, benefits, options and alternatives of treatment were discussed, including but not limited to: Infection, blood loss, DVT/PE, poor wound healing, anesthetic risks, loss of motion, nerve/blood vessel damage, persistent/recurrent symptoms   I will see him 1 week postoperatively  He has plans and will be out of town at the end of April and would like to wait until the early part of May to proceed  He will need preoperative medical clearance and preoperative testing  Return for   One week postoperatively  _____________________________________________________  CHIEF COMPLAINT:  Chief Complaint   Patient presents with    Follow-up         SUBJECTIVE:  Emmanuel Navarro is a 61y o  year old male who presents for follow up  Of his left shoulder symptoms  He denies any change in symptoms since he was last seen  His MRI has been obtained and is available for review  He continues to complain of pain with overhead activities as well as with reaching behind him  He denies paresthesias        PAST MEDICAL HISTORY:  Past Medical History:   Diagnosis Date    [facility] [address] [facility]     [facility]     High cholesterol     Hypertension        PAST SURGICAL HISTORY:  Past Surgical History:   Procedure Laterality Date    CHOLECYSTECTOMY      VASECTOMY      VASECTOMY REVERSAL         FAMILY HISTORY:  Family History   Problem Relation Age of Onset    Dementia Mother     No Known Problems Father        SOCIAL HISTORY:  Social History     Tobacco Use    Smoking status: Former Smoker     Quit date: 2015     Years since quittin 1    Smokeless tobacco: Never Used   Substance Use Topics    Alcohol use: Yes     Comment: occasionally    Drug use: Never       MEDICATIONS:    Current Outpatient Medications:     aspirin (Aspirin Adult Low Strength) 81 mg EC tablet, , Disp: , Rfl:     atorvastatin (LIPITOR) 80 mg tablet, , Disp: , Rfl:     carvedilol (COREG) 25 mg tablet, , Disp: , Rfl:     citalopram (CeleXA) 10 mg tablet, , Disp: , Rfl:     Coenzyme Q10 (CoQ-10) 100 MG CAPS, , Disp: , Rfl:     esomeprazole (NexIUM) 5 MG packet, Take by mouth, Disp: , Rfl:     Farxiga 10 MG TABS, TAKE 10 MG BY MOUTH DAILY  , Disp: , Rfl:     hydrochlorothiazide (HYDRODIURIL) 25 mg tablet, , Disp: , Rfl:     Multiple Vitamins-Minerals (PreserVision AREDS 2) CAPS, , Disp: , Rfl:     Ozempic, 1 MG/DOSE, 2 MG/1 5ML SOPN, , Disp: , Rfl:     ramipril (ALTACE) 10 MG capsule, , Disp: , Rfl:     triamcinolone (KENALOG) 0 5 % cream, Apply 1 application topically 2 (two) times a day To affected area, Disp: , Rfl:     naproxen (NAPROSYN) 500 mg tablet, Take 1 tablet (500 mg total) by mouth 2 (two) times a day with meals (Patient not taking: Reported on 3/3/2021), Disp: 60 tablet, Rfl: 1    ALLERGIES:  Allergies   Allergen Reactions    Food Allergy [Soybean Oil - Food Allergy] Anaphylaxis     Organic soy       REVIEW OF SYSTEMS:  Pertinent items are noted in HPI    A comprehensive review of systems was negative       _____________________________________________________  PHYSICAL EXAMINATION:  General: alert, oriented times 3 and appears comfortable  Psychiatric: Normal  HEENT:  Normocephalic, atraumatic  Cardiovascular:  Regular  Pulmonary: No wheezing or stridor  Skin: No masses, erthema, lacerations, fluctation, ulcerations  Neurovascular: Motor and sensory exams are intact including excellent strength of the left shoulder excluding some mild weakness secondary to pain with resisted internal rotation  Pulses are palpable  MUSCULOSKELETAL EXAMINATION:      The left shoulder exam demonstrates good range of motion  He does lack some abduction/ IR compared to the contralateral right side  Irion's and Magui's test elicited complaints of mild pain over the lateral shoulder  The acromioclavicular joint is nontender  The rotator cuff insertion is minimally tender  Biceps and anterior shoulder nontender  He has no evidence of instability  The remainder of the  Upper extremity examination bilaterally is benign  _____________________________________________________  STUDIES REVIEWED:    The MRI of his shoulder demonstrates rotator cuff tendinitis/ tendinosis with a area of probable tear at the anterior portion  There is some tendinitis noted at the subscapularis  Some mild degenerative changes are noted at the glenohumeral joint with more significant degenerative changes noted at the acromioclavicular joint  The MRI report was reviewed          Kaila Anger

## 2021-04-23 ENCOUNTER — PREP FOR PROCEDURE (OUTPATIENT)
Dept: OBGYN CLINIC | Facility: CLINIC | Age: 66
End: 2021-04-23

## 2021-04-23 DIAGNOSIS — S43.421D SPRAIN OF RIGHT ROTATOR CUFF CAPSULE, SUBSEQUENT ENCOUNTER: ICD-10-CM

## 2021-04-23 DIAGNOSIS — Z01.812 PRE-OPERATIVE LABORATORY EXAMINATION: Primary | ICD-10-CM

## 2021-04-26 ENCOUNTER — PREP FOR PROCEDURE (OUTPATIENT)
Dept: OBGYN CLINIC | Facility: CLINIC | Age: 66
End: 2021-04-26

## 2021-04-29 ENCOUNTER — OFFICE VISIT (OUTPATIENT)
Dept: LAB | Facility: HOSPITAL | Age: 66
End: 2021-04-29
Attending: ORTHOPAEDIC SURGERY
Payer: COMMERCIAL

## 2021-04-29 ENCOUNTER — OFFICE VISIT (OUTPATIENT)
Dept: FAMILY MEDICINE CLINIC | Facility: CLINIC | Age: 66
End: 2021-04-29
Payer: COMMERCIAL

## 2021-04-29 VITALS
WEIGHT: 201.6 LBS | DIASTOLIC BLOOD PRESSURE: 82 MMHG | SYSTOLIC BLOOD PRESSURE: 136 MMHG | HEIGHT: 67 IN | HEART RATE: 96 BPM | OXYGEN SATURATION: 97 % | TEMPERATURE: 98.2 F | BODY MASS INDEX: 31.64 KG/M2

## 2021-04-29 DIAGNOSIS — Z41.9 ELECTIVE SURGERY: ICD-10-CM

## 2021-04-29 DIAGNOSIS — S43.421D SPRAIN OF RIGHT ROTATOR CUFF CAPSULE, SUBSEQUENT ENCOUNTER: ICD-10-CM

## 2021-04-29 DIAGNOSIS — Z01.818 PREOPERATIVE EXAMINATION: Primary | ICD-10-CM

## 2021-04-29 DIAGNOSIS — Z01.812 PRE-OPERATIVE LABORATORY EXAMINATION: ICD-10-CM

## 2021-04-29 DIAGNOSIS — G89.29 CHRONIC LEFT SHOULDER PAIN: ICD-10-CM

## 2021-04-29 DIAGNOSIS — Z41.9 ELECTIVE SURGERY: Primary | ICD-10-CM

## 2021-04-29 DIAGNOSIS — E11.9 ENCOUNTER FOR DIABETIC FOOT EXAM (HCC): ICD-10-CM

## 2021-04-29 DIAGNOSIS — M25.512 CHRONIC LEFT SHOULDER PAIN: ICD-10-CM

## 2021-04-29 DIAGNOSIS — M75.82 TENDINITIS OF LEFT ROTATOR CUFF: ICD-10-CM

## 2021-04-29 DIAGNOSIS — E11.9 TYPE 2 DIABETES MELLITUS WITHOUT COMPLICATION, WITHOUT LONG-TERM CURRENT USE OF INSULIN (HCC): ICD-10-CM

## 2021-04-29 DIAGNOSIS — I10 ESSENTIAL HYPERTENSION: ICD-10-CM

## 2021-04-29 PROBLEM — F41.9 ANXIETY: Status: ACTIVE | Noted: 2021-04-29

## 2021-04-29 LAB
ANION GAP SERPL CALCULATED.3IONS-SCNC: 5 MMOL/L (ref 4–13)
BUN SERPL-MCNC: 16 MG/DL (ref 5–25)
CALCIUM SERPL-MCNC: 9.1 MG/DL (ref 8.3–10.1)
CHLORIDE SERPL-SCNC: 101 MMOL/L (ref 100–108)
CO2 SERPL-SCNC: 32 MMOL/L (ref 21–32)
CREAT SERPL-MCNC: 0.87 MG/DL (ref 0.6–1.3)
GFR SERPL CREATININE-BSD FRML MDRD: 91 ML/MIN/1.73SQ M
GLUCOSE SERPL-MCNC: 147 MG/DL (ref 65–140)
POTASSIUM SERPL-SCNC: 3.9 MMOL/L (ref 3.5–5.3)
SODIUM SERPL-SCNC: 138 MMOL/L (ref 136–145)

## 2021-04-29 PROCEDURE — 80048 BASIC METABOLIC PNL TOTAL CA: CPT

## 2021-04-29 PROCEDURE — 3725F SCREEN DEPRESSION PERFORMED: CPT | Performed by: NURSE PRACTITIONER

## 2021-04-29 PROCEDURE — 3008F BODY MASS INDEX DOCD: CPT | Performed by: ORTHOPAEDIC SURGERY

## 2021-04-29 PROCEDURE — 99214 OFFICE O/P EST MOD 30 MIN: CPT | Performed by: NURSE PRACTITIONER

## 2021-04-29 PROCEDURE — 1036F TOBACCO NON-USER: CPT | Performed by: NURSE PRACTITIONER

## 2021-04-29 PROCEDURE — 93005 ELECTROCARDIOGRAM TRACING: CPT

## 2021-04-29 PROCEDURE — 3008F BODY MASS INDEX DOCD: CPT | Performed by: NURSE PRACTITIONER

## 2021-04-29 PROCEDURE — 36415 COLL VENOUS BLD VENIPUNCTURE: CPT

## 2021-04-30 ENCOUNTER — TELEPHONE (OUTPATIENT)
Dept: ADMINISTRATIVE | Facility: OTHER | Age: 66
End: 2021-04-30

## 2021-04-30 NOTE — TELEPHONE ENCOUNTER
----- Message from Simran Mcdonough sent at 4/29/2021  4:42 PM EDT -----  Regarding: Care Gap Update  04/29/21 4:42 PM    Hello, our patient attached above has had Hemoglobin A1c completed/performed  Please assist in updating the patient chart by pulling the Care Everywhere (CE) document  The date of service is 03/23/21 done at [facility]       Thank you,  Simran Mcdonough   [facility]

## 2021-04-30 NOTE — TELEPHONE ENCOUNTER
Upon review of the In Basket request we were able to locate, review, and update the patient chart as requested for Hemoglobin A1c  Any additional questions or concerns should be emailed to the Practice Liaisons via [email or web address] email, please do not reply via In Basket      Thank you  Salima Darby

## 2021-04-30 NOTE — TELEPHONE ENCOUNTER
----- Message from Shaylee Bergeron sent at 4/29/2021  4:43 PM EDT -----  Regarding: Care Gap Update  04/29/21 4:43 PM    Hello, our patient attached above has had Hepatitis C completed/performed  Please assist in updating the patient chart by pulling the Care Everywhere (CE) document  The date of service is 03/19/2021 done at [facility]       Thank you,  Shaylee Bergeron   [facility]

## 2021-04-30 NOTE — TELEPHONE ENCOUNTER
Upon review of the In Basket request we have found/obtained the documentation  After careful review of the document we are unable to complete this request for Urine Microalbumin because the documentation does not have the proper verbiage (wording) needed to close the requested care gap(s)  Any additional questions or concerns should be emailed to the Practice Liaisons via [email or web address] email, please do not reply via In Basket      Thank you  Diamond Aleman

## 2021-04-30 NOTE — TELEPHONE ENCOUNTER
----- Message from Jessy Peterson sent at 4/29/2021  4:42 PM EDT -----  Regarding: Care Gap Update  04/29/21 4:42 PM    Hello, our patient attached above has had Urine Microalbumin completed/performed  Please assist in updating the patient chart by pulling the Care Everywhere (CE) document  The date of service is 03/19/21 done at [facility]       Thank you,  Jessy Peterson   [facility]

## 2021-05-02 LAB
ATRIAL RATE: 80 BPM
P AXIS: 63 DEGREES
PR INTERVAL: 192 MS
QRS AXIS: -23 DEGREES
QRSD INTERVAL: 96 MS
QT INTERVAL: 378 MS
QTC INTERVAL: 435 MS
T WAVE AXIS: 29 DEGREES
VENTRICULAR RATE: 80 BPM

## 2021-05-02 PROCEDURE — 93010 ELECTROCARDIOGRAM REPORT: CPT | Performed by: INTERNAL MEDICINE

## 2021-05-03 NOTE — TELEPHONE ENCOUNTER
Upon review of the In Basket request we were able to locate, review, and update the patient chart as requested for Hemoglobin A1c and Hepatitis C   Any additional questions or concerns should be emailed to the Practice Liaisons via [email or web address] email, please do not reply via In Basket      Thank you  Priyanka Jasso

## 2021-05-04 RX ORDER — ACETAMINOPHEN,DIPHENHYDRAMINE HCL 500; 25 MG/1; MG/1
2 TABLET, FILM COATED ORAL
COMMUNITY
End: 2021-08-10

## 2021-05-05 ENCOUNTER — ANESTHESIA EVENT (OUTPATIENT)
Dept: PERIOP | Facility: HOSPITAL | Age: 66
End: 2021-05-05
Payer: COMMERCIAL

## 2021-05-05 NOTE — ANESTHESIA PREPROCEDURE EVALUATION
Procedure:  ARTHROSCOPY SHOULDER , debridement; possible open rotator cuff repair (Left Shoulder)    Difficult airway  Soybean allergy, had soy milk and had difficulty breathing and rash required epinephrine in ER  ECG: NSR  TTE: EF 60%with mild HK lateral wall, RV wnl: Prior MI had stent  Takes carvedilol last took today  A1c 6 7    Denies the following: CP/SOB with exertion, asthma, COPD, ALETA (does endorse snoring), stroke/TIA, seizure    Relevant Problems   CARDIO   (+) Coronary artery disease   (+) High cholesterol   (+) Hypertension      GI/HEPATIC   (+) Gastroesophageal reflux disease without esophagitis      NEURO/PSYCH   (+) Anxiety   (+) Chronic left shoulder pain        Physical Exam    Airway    Mallampati score: III  TM Distance: >3 FB  Neck ROM: full     Dental       Cardiovascular      Pulmonary      Other Findings        Anesthesia Plan  ASA Score- 3     Anesthesia Type- general and regional with ASA Monitors  Additional Monitors:   Airway Plan:     Comment: Interscalene block  Plan Factors-Exercise tolerance (METS): >4 METS  Chart reviewed  EKG reviewed  Existing labs reviewed  Patient summary reviewed  Obstructive sleep apnea risk education given perioperatively  Induction- inhalational     Postoperative Plan-     Informed Consent- Anesthetic plan and risks discussed with patient  I personally reviewed this patient with the CRNA  Discussed and agreed on the Anesthesia Plan with the CRNA  Minna Gold

## 2021-05-06 ENCOUNTER — ANESTHESIA (OUTPATIENT)
Dept: PERIOP | Facility: HOSPITAL | Age: 66
End: 2021-05-06
Payer: COMMERCIAL

## 2021-05-06 ENCOUNTER — HOSPITAL ENCOUNTER (OUTPATIENT)
Facility: HOSPITAL | Age: 66
Setting detail: OUTPATIENT SURGERY
Discharge: HOME | End: 2021-05-06
Attending: ORTHOPAEDIC SURGERY | Admitting: ORTHOPAEDIC SURGERY
Payer: COMMERCIAL

## 2021-05-06 VITALS
WEIGHT: 201 LBS | TEMPERATURE: 97.8 F | OXYGEN SATURATION: 93 % | RESPIRATION RATE: 20 BRPM | HEIGHT: 67 IN | DIASTOLIC BLOOD PRESSURE: 69 MMHG | SYSTOLIC BLOOD PRESSURE: 135 MMHG | HEART RATE: 87 BPM | BODY MASS INDEX: 31.55 KG/M2

## 2021-05-06 DIAGNOSIS — M75.122 NONTRAUMATIC COMPLETE TEAR OF LEFT ROTATOR CUFF: Primary | ICD-10-CM

## 2021-05-06 LAB — GLUCOSE SERPL-MCNC: 129 MG/DL (ref 65–140)

## 2021-05-06 PROCEDURE — 29822 SHO ARTHRS SRG LMTD DBRDMT: CPT | Performed by: PHYSICIAN ASSISTANT

## 2021-05-06 PROCEDURE — C9290 INJ, BUPIVACAINE LIPOSOME: HCPCS | Performed by: ANESTHESIOLOGY

## 2021-05-06 PROCEDURE — 23412 REPAIR ROTATOR CUFF CHRONIC: CPT | Performed by: ORTHOPAEDIC SURGERY

## 2021-05-06 PROCEDURE — 82948 REAGENT STRIP/BLOOD GLUCOSE: CPT

## 2021-05-06 PROCEDURE — 23412 REPAIR ROTATOR CUFF CHRONIC: CPT | Performed by: PHYSICIAN ASSISTANT

## 2021-05-06 PROCEDURE — C1713 ANCHOR/SCREW BN/BN,TIS/BN: HCPCS | Performed by: ORTHOPAEDIC SURGERY

## 2021-05-06 PROCEDURE — 29822 SHO ARTHRS SRG LMTD DBRDMT: CPT | Performed by: ORTHOPAEDIC SURGERY

## 2021-05-06 DEVICE — BIO-COMP SWVLK C, CLD 4.75X19.1MM
Type: IMPLANTABLE DEVICE | Site: SHOULDER | Status: FUNCTIONAL
Brand: ARTHREX®

## 2021-05-06 DEVICE — SUTURE ANCHOR, PEEK CORKSCREW FT
Type: IMPLANTABLE DEVICE | Site: SHOULDER | Status: FUNCTIONAL
Brand: ARTHREX®

## 2021-05-06 RX ORDER — ACETAMINOPHEN 325 MG/1
975 TABLET ORAL ONCE
Status: COMPLETED | OUTPATIENT
Start: 2021-05-06 | End: 2021-05-06

## 2021-05-06 RX ORDER — ONDANSETRON 2 MG/ML
4 INJECTION INTRAMUSCULAR; INTRAVENOUS EVERY 6 HOURS PRN
Status: DISCONTINUED | OUTPATIENT
Start: 2021-05-06 | End: 2021-05-06 | Stop reason: HOSPADM

## 2021-05-06 RX ORDER — HYDROMORPHONE HCL/PF 1 MG/ML
0.5 SYRINGE (ML) INJECTION
Status: DISCONTINUED | OUTPATIENT
Start: 2021-05-06 | End: 2021-05-06 | Stop reason: HOSPADM

## 2021-05-06 RX ORDER — ROCURONIUM BROMIDE 10 MG/ML
INJECTION, SOLUTION INTRAVENOUS AS NEEDED
Status: DISCONTINUED | OUTPATIENT
Start: 2021-05-06 | End: 2021-05-06

## 2021-05-06 RX ORDER — FENTANYL CITRATE 50 UG/ML
INJECTION, SOLUTION INTRAMUSCULAR; INTRAVENOUS AS NEEDED
Status: DISCONTINUED | OUTPATIENT
Start: 2021-05-06 | End: 2021-05-06

## 2021-05-06 RX ORDER — ONDANSETRON 2 MG/ML
4 INJECTION INTRAMUSCULAR; INTRAVENOUS ONCE AS NEEDED
Status: DISCONTINUED | OUTPATIENT
Start: 2021-05-06 | End: 2021-05-06 | Stop reason: HOSPADM

## 2021-05-06 RX ORDER — SODIUM CHLORIDE, SODIUM LACTATE, POTASSIUM CHLORIDE, CALCIUM CHLORIDE 600; 310; 30; 20 MG/100ML; MG/100ML; MG/100ML; MG/100ML
INJECTION, SOLUTION INTRAVENOUS CONTINUOUS PRN
Status: DISCONTINUED | OUTPATIENT
Start: 2021-05-06 | End: 2021-05-06

## 2021-05-06 RX ORDER — CEFAZOLIN SODIUM 2 G/50ML
2000 SOLUTION INTRAVENOUS ONCE
Status: COMPLETED | OUTPATIENT
Start: 2021-05-06 | End: 2021-05-06

## 2021-05-06 RX ORDER — ROPIVACAINE HYDROCHLORIDE 2 MG/ML
INJECTION, SOLUTION EPIDURAL; INFILTRATION; PERINEURAL
Status: COMPLETED | OUTPATIENT
Start: 2021-05-06 | End: 2021-05-06

## 2021-05-06 RX ORDER — EPHEDRINE SULFATE 50 MG/ML
INJECTION INTRAVENOUS AS NEEDED
Status: DISCONTINUED | OUTPATIENT
Start: 2021-05-06 | End: 2021-05-06

## 2021-05-06 RX ORDER — DEXAMETHASONE SODIUM PHOSPHATE 10 MG/ML
INJECTION, SOLUTION INTRAMUSCULAR; INTRAVENOUS AS NEEDED
Status: DISCONTINUED | OUTPATIENT
Start: 2021-05-06 | End: 2021-05-06

## 2021-05-06 RX ORDER — SUCCINYLCHOLINE/SOD CL,ISO/PF 100 MG/5ML
SYRINGE (ML) INTRAVENOUS AS NEEDED
Status: DISCONTINUED | OUTPATIENT
Start: 2021-05-06 | End: 2021-05-06

## 2021-05-06 RX ORDER — GINSENG 100 MG
CAPSULE ORAL AS NEEDED
Status: DISCONTINUED | OUTPATIENT
Start: 2021-05-06 | End: 2021-05-06 | Stop reason: HOSPADM

## 2021-05-06 RX ORDER — ALBUMIN, HUMAN INJ 5% 5 %
SOLUTION INTRAVENOUS CONTINUOUS PRN
Status: DISCONTINUED | OUTPATIENT
Start: 2021-05-06 | End: 2021-05-06

## 2021-05-06 RX ORDER — ACETAMINOPHEN 325 MG/1
650 TABLET ORAL EVERY 6 HOURS PRN
Status: DISCONTINUED | OUTPATIENT
Start: 2021-05-06 | End: 2021-05-06 | Stop reason: HOSPADM

## 2021-05-06 RX ORDER — MAGNESIUM HYDROXIDE 1200 MG/15ML
LIQUID ORAL AS NEEDED
Status: DISCONTINUED | OUTPATIENT
Start: 2021-05-06 | End: 2021-05-06 | Stop reason: HOSPADM

## 2021-05-06 RX ORDER — ONDANSETRON 2 MG/ML
INJECTION INTRAMUSCULAR; INTRAVENOUS AS NEEDED
Status: DISCONTINUED | OUTPATIENT
Start: 2021-05-06 | End: 2021-05-06

## 2021-05-06 RX ORDER — CHLORHEXIDINE GLUCONATE 0.12 MG/ML
15 RINSE ORAL ONCE
Status: COMPLETED | OUTPATIENT
Start: 2021-05-06 | End: 2021-05-06

## 2021-05-06 RX ORDER — HYDROCODONE BITARTRATE AND ACETAMINOPHEN 5; 325 MG/1; MG/1
TABLET ORAL
Qty: 30 TABLET | Refills: 0 | Status: SHIPPED | OUTPATIENT
Start: 2021-05-06 | End: 2021-08-10

## 2021-05-06 RX ORDER — KETOROLAC TROMETHAMINE 30 MG/ML
INJECTION, SOLUTION INTRAMUSCULAR; INTRAVENOUS AS NEEDED
Status: DISCONTINUED | OUTPATIENT
Start: 2021-05-06 | End: 2021-05-06

## 2021-05-06 RX ORDER — MIDAZOLAM HYDROCHLORIDE 2 MG/2ML
INJECTION, SOLUTION INTRAMUSCULAR; INTRAVENOUS AS NEEDED
Status: DISCONTINUED | OUTPATIENT
Start: 2021-05-06 | End: 2021-05-06

## 2021-05-06 RX ORDER — LIDOCAINE HYDROCHLORIDE 10 MG/ML
INJECTION, SOLUTION EPIDURAL; INFILTRATION; INTRACAUDAL; PERINEURAL AS NEEDED
Status: DISCONTINUED | OUTPATIENT
Start: 2021-05-06 | End: 2021-05-06

## 2021-05-06 RX ORDER — HYDROCODONE BITARTRATE AND ACETAMINOPHEN 5; 325 MG/1; MG/1
1 TABLET ORAL EVERY 6 HOURS PRN
Status: DISCONTINUED | OUTPATIENT
Start: 2021-05-06 | End: 2021-05-06 | Stop reason: HOSPADM

## 2021-05-06 RX ORDER — FENTANYL CITRATE/PF 50 MCG/ML
25 SYRINGE (ML) INJECTION
Status: DISCONTINUED | OUTPATIENT
Start: 2021-05-06 | End: 2021-05-06 | Stop reason: HOSPADM

## 2021-05-06 RX ADMIN — PHENYLEPHRINE HYDROCHLORIDE 100 MCG: 10 INJECTION INTRAVENOUS at 08:04

## 2021-05-06 RX ADMIN — EPHEDRINE SULFATE 10 MG: 50 INJECTION, SOLUTION INTRAVENOUS at 08:06

## 2021-05-06 RX ADMIN — PHENYLEPHRINE HYDROCHLORIDE 100 MCG: 10 INJECTION INTRAVENOUS at 07:52

## 2021-05-06 RX ADMIN — CHLORHEXIDINE GLUCONATE 0.12% ORAL RINSE 15 ML: 1.2 LIQUID ORAL at 06:40

## 2021-05-06 RX ADMIN — DEXAMETHASONE SODIUM PHOSPHATE 4 MG: 10 INJECTION, SOLUTION INTRAMUSCULAR; INTRAVENOUS at 08:11

## 2021-05-06 RX ADMIN — ROPIVACAINE HYDROCHLORIDE 10 ML: 2 INJECTION, SOLUTION EPIDURAL; INFILTRATION at 08:48

## 2021-05-06 RX ADMIN — PHENYLEPHRINE HYDROCHLORIDE 200 MCG: 10 INJECTION INTRAVENOUS at 08:06

## 2021-05-06 RX ADMIN — PHENYLEPHRINE HYDROCHLORIDE 100 MCG: 10 INJECTION INTRAVENOUS at 07:59

## 2021-05-06 RX ADMIN — FENTANYL CITRATE 100 MCG: 50 INJECTION, SOLUTION INTRAMUSCULAR; INTRAVENOUS at 07:53

## 2021-05-06 RX ADMIN — SODIUM CHLORIDE, SODIUM LACTATE, POTASSIUM CHLORIDE, AND CALCIUM CHLORIDE: .6; .31; .03; .02 INJECTION, SOLUTION INTRAVENOUS at 09:40

## 2021-05-06 RX ADMIN — ROCURONIUM BROMIDE 50 MG: 10 INJECTION, SOLUTION INTRAVENOUS at 07:58

## 2021-05-06 RX ADMIN — ACETAMINOPHEN 975 MG: 325 TABLET ORAL at 06:40

## 2021-05-06 RX ADMIN — SODIUM CHLORIDE, SODIUM LACTATE, POTASSIUM CHLORIDE, AND CALCIUM CHLORIDE: .6; .31; .03; .02 INJECTION, SOLUTION INTRAVENOUS at 07:40

## 2021-05-06 RX ADMIN — PHENYLEPHRINE HYDROCHLORIDE 100 MCG: 10 INJECTION INTRAVENOUS at 08:00

## 2021-05-06 RX ADMIN — MIDAZOLAM HYDROCHLORIDE 2 MG: 1 INJECTION, SOLUTION INTRAMUSCULAR; INTRAVENOUS at 07:15

## 2021-05-06 RX ADMIN — FENTANYL CITRATE 25 MCG: 50 INJECTION INTRAMUSCULAR; INTRAVENOUS at 09:57

## 2021-05-06 RX ADMIN — Medication 100 MG: at 07:50

## 2021-05-06 RX ADMIN — PHENYLEPHRINE HYDROCHLORIDE 50 MCG/MIN: 10 INJECTION INTRAVENOUS at 07:58

## 2021-05-06 RX ADMIN — LIDOCAINE HYDROCHLORIDE 50 MG: 10 INJECTION, SOLUTION EPIDURAL; INFILTRATION; INTRACAUDAL; PERINEURAL at 07:52

## 2021-05-06 RX ADMIN — FENTANYL CITRATE 25 MCG: 50 INJECTION INTRAMUSCULAR; INTRAVENOUS at 09:47

## 2021-05-06 RX ADMIN — ALBUMIN HUMAN: 0.05 INJECTION, SOLUTION INTRAVENOUS at 08:02

## 2021-05-06 RX ADMIN — KETOROLAC TROMETHAMINE 30 MG: 30 INJECTION, SOLUTION INTRAMUSCULAR at 09:08

## 2021-05-06 RX ADMIN — CEFAZOLIN SODIUM 2000 MG: 2 SOLUTION INTRAVENOUS at 07:32

## 2021-05-06 RX ADMIN — SUGAMMADEX 200 MG: 100 INJECTION, SOLUTION INTRAVENOUS at 09:17

## 2021-05-06 RX ADMIN — ONDANSETRON 4 MG: 2 INJECTION INTRAMUSCULAR; INTRAVENOUS at 08:38

## 2021-05-06 NOTE — INTERVAL H&P NOTE
H&P reviewed  After examining the patient I find no changes in the patients condition since the H&P had been written      EXAM:   Abdomen: soft, Nontender    Vitals:    05/06/21 0621   BP: 155/86   Pulse: 91   Resp: 20   Temp: 98 4 °F (36 9 °C)   SpO2: 98%

## 2021-05-06 NOTE — OP NOTE
OPERATIVE REPORT  PATIENT NAME: David Gonzalez    :  1955  MRN: [id number]  Pt Location: OW OR ROOM 02    SURGERY DATE: 2021    Surgeon(s) and Role:     * Lenore Springer - Primary     * Leah Adams PA-C - Assisting    Preop Diagnosis:  Chronic left shoulder pain [M25 512, G89 29]  Sprain of right rotator cuff capsule, subsequent encounter [S43 421D]    Post-Op Diagnosis Codes:     * Chronic left shoulder pain [M25 512, G89 29], synovitis and degenerative labral tear left shoulder    Tear rotator cuff left shoulder    Procedure(s) (LRB):  ARTHROSCOPY SHOULDER , debridement/synovectomy; open rotator cuff repair (Left)    Fluids:  250 cc albumin, 800 cc of crystalloid    Estimated Blood Loss:   50 mL    Anesthesia Type:   General w/ Interscalene Block    Operative Indications:  Chronic left shoulder pain [M25 512, G89 29]  Sprain of right rotator cuff capsule, subsequent encounter Anna Corral is a 14-year-old ambidextrous male who has had chronic left shoulder pain but acutely exacerbated his symptoms approximately 6 months ago when he fell  Conservative care was in adequate in relieving his symptoms an MRI was obtained demonstrating evidence of an anterior supraspinatus tear of the rotator cuff with associated tendinosis  Having failed conservative measures, the risks, benefits, options and alternatives of treatment having been thoroughly discussed, he elected to proceed with arthroscopic and open surgery  Operative Findings: At the time of the procedure, the patient was noted to have a moderate degree of synovitis in the shoulder joint  In addition, there was degenerative fraying of the labral tissue  In addition, inspection of the rotator cuff insertion demonstrated a complete tear of the anterior portion of the supraspinatus  At the completion of the arthroscopic portion of the procedure, the labral tissue had been debrided to a stable rim    There were some mild degenerative changes noted at the glenohumeral joint  In regards to the rotator cuff, a stable repair was performed utilizing standard open technique with excellent range of motion and no evidence of repair separation with passive motion  Complications:   None    Procedure and Technique:  Yung Gregory was taken to the operating room where he was administered a general anesthetic  He had been administered an interscalene block in the holding area  He was placed onto the operative table with the shoulder device in place and then secured, placed into a beach chair position with care taken to protect neurovascular structures and bony prominences  The left upper extremity was then prepped and draped in standard fashion  The shoulder was insufflated with approximately 30 cc of saline solution and then the posterior mid glenohumeral portal established  Diagnostic arthroscopy was performed  The biceps tendon was noted to have synovial hypertrophy but no evidence of tear was noted  The subscapularis tendon was noted to have some longitudinal splits but no evidence of a definitive tear  The glenoid was noted to have a mild degree of degenerative change without significant degenerative change noted on the humeral head  Inspection of the inferior recess demonstrated no evidence of pathology  Inspection posteriorly also demonstrated no significant pathology D  The labrum was noted to be frayed and extended over the labrum  The anterior mid glenohumeral portal was then established in a standard fashion under direct visualization  An arthroscopic shaver was inserted and synovectomy performed as well as debridement of the labral tissue  Shoulder was abducted and inspection of the rotator cuff insertion demonstrated a complete tear near the anterior portion  The shaver was used to debride some of the intra-articular surface tissue    Once this was completed, the shoulder was inspected and no further pathology being noted, shoulder was irrigated and then the inflow discontinued and the shoulder suction to dry as possible  The instrumentation was removed and the arthroscopic portals closed with 4-0 nylon  An incision was made over the anterolateral aspect of the shoulder and sharp dissection carried down through the skin and subcutaneous tissues  Bleeding was controlled with cautery and then the deltoid muscle was split at the junction of the anterior middle thirds  The deltoid was retracted and subacromial bursectomy performed  The rotator cuff tear was then noted involving approximately a 1 cm to 1 5 cm length of tissue which was  from its humeral insertion by about 5 mm  The rotator cuff was mobilized  My finger could easily be placed beneath the acromion and it was not felt that acromionectomy would be necessary  I rotator cuff repair was then performed utilizing Arthrex anchors with a standard 2 row repair technique  Shoulder was placed through gentle range of motion and there was no evidence of instability at the repair site  The wound was irrigated with saline solution  The deltoid was reapproximated to the acromion utilizing FiberWire stitch  The deltoid split was repaired utilizing 2 O Vicryl  Subcutaneous tissues were approximated with 2-0 Vicryl and the skin secured with skin glue  Dressings were then applied consisting of bacitracin Adaptic for the arthroscopic portals and Telfa for the rotator cuff incision  Dressings then consisted of dry gauze, ABD and tape  A shoulder immobilizer was applied and the patient was then awakened from the general anesthetic and transferred to the recovery room in stable and satisfactory postoperative condition       I was present for the entire procedure, A qualified resident physician was not available and A physician assistant was required during the procedure for retraction tissue handling,dissection and suturing  The PA was required to assist with retraction of soft tissues for the open rotator cuff repair and to assist with stabilization and manipulation of the left upper extremity during the procedure, both during the arthroscopic portion and open portions of the procedure      Patient Disposition:  PACU     SIGNATURE: Oseas Ferro  DATE: May 6, 2021  TIME: 9:28 AM

## 2021-05-06 NOTE — DISCHARGE INSTRUCTIONS
Discharge Instructions - Orthopedics  Adriana Farley 61 y o  male MRN: [id number]  Unit/Bed#: [facility] OR MAIN    Weight Bearing Status:                                           Non-weight bearing with the left upper extremity  Pain:  Continue analgesics as directed  You pain medications were sent to the pharmacy on record  Dressing Instructions: You may remove dressing in 3 days (72 hours)  After dressing removal, you may shower and let water run over incisions  Do NOT submerge incisions for any length of time  Immobilizer:   Keep immobilizer in place at all times  You can loosen the wrist strap to move the elbow to prevent elbow stiffness  If you remove the immobilizer to shower, keep you arm at your side at all times  Do not try to lift the arm yourself  Appt Instructions: If you do not have your appointment, please call the clinic at [phone] to f/u with Dr Denita Marquis in 1 week  Otherwise followup as scheduled  Contact the office sooner if you experience any increased numbness/tingling in the extremities

## 2021-05-06 NOTE — ANESTHESIA PROCEDURE NOTES
Peripheral Block    Patient location during procedure: holding area  Start time: 5/6/2021 7:20 AM  Reason for block: at surgeon's request and post-op pain management  Staffing  Performed: anesthesiologist   Preanesthetic Checklist  Completed: patient identified, site marked, surgical consent, pre-op evaluation, timeout performed, IV checked, risks and benefits discussed and monitors and equipment checked  Peripheral Block  Patient position: sitting  Prep: ChloraPrep  Patient monitoring: continuous pulse ox and frequent blood pressure checks  Block type: interscalene  Laterality: left  Injection technique: single-shot  Procedures: ultrasound guided, Ultrasound guidance required for the procedure to increase accuracy and safety of medication placement and decrease risk of complications    Ultrasound permanent image saved (Attempted to save US )ropivacaine (NAROPIN) 0 2% perineural infiltration, 10 mL (20 ml exparel)  Needle  Needle type: Stimuplex   Needle gauge: 22 G  Needle length: 5 cm  Needle localization: anatomical landmarks and ultrasound guidance  Test dose: negative  Assessment  Injection assessment: incremental injection, local visualized surrounding nerve on ultrasound, negative aspiration for CSF, negative aspiration for heme and no paresthesia on injection  Paresthesia pain: none  Heart rate change: no  Slow fractionated injection: yes  Post-procedure:  site cleaned  patient tolerated the procedure well with no immediate complications

## 2021-05-06 NOTE — INTERVAL H&P NOTE
H&P reviewed  After examining the patient I find no changes in the patients condition since the H&P had been written      Vitals:    05/06/21 0621   BP: 155/86   Pulse: 91   Resp: 20   Temp: 98 4 °F (36 9 °C)   SpO2: 98%

## 2021-05-06 NOTE — ANESTHESIA POSTPROCEDURE EVALUATION
Post-Op Assessment Note    CV Status:  Stable  Pain Score: 0    Pain management: adequate     Mental Status:  Alert and awake   Hydration Status:  Stable   PONV Controlled:  Controlled   Airway Patency:  Patent      Post Op Vitals Reviewed: Yes      Staff: CRNA, Anesthesiologist   Comments: Pt stable, no airway concerns  VSS        No complications documented      /79 (05/06/21 0935)    Temp 97 8 °F (36 6 °C) (05/06/21 0935)    Pulse 84 (05/06/21 0935)   Resp 16 (05/06/21 0935)    SpO2 100 % (05/06/21 0935)

## 2021-05-06 NOTE — INTERVAL H&P NOTE
H&P reviewed  After examining the patient I find no changes in the patients condition since the H&P had been written      Exam  Abdomen:  Soft, nontender    Vitals:    05/06/21 0621   BP: 155/86   Pulse: 91   Resp: 20   Temp: 98 4 °F (36 9 °C)   SpO2: 98%

## 2021-05-13 ENCOUNTER — OFFICE VISIT (OUTPATIENT)
Dept: OBGYN CLINIC | Facility: CLINIC | Age: 66
End: 2021-05-13

## 2021-05-13 VITALS
BODY MASS INDEX: 31.55 KG/M2 | DIASTOLIC BLOOD PRESSURE: 78 MMHG | SYSTOLIC BLOOD PRESSURE: 120 MMHG | HEIGHT: 67 IN | TEMPERATURE: 98.7 F | HEART RATE: 101 BPM | WEIGHT: 201 LBS

## 2021-05-13 DIAGNOSIS — M75.122 NONTRAUMATIC COMPLETE TEAR OF LEFT ROTATOR CUFF: Primary | ICD-10-CM

## 2021-05-13 PROCEDURE — 99024 POSTOP FOLLOW-UP VISIT: CPT | Performed by: ORTHOPAEDIC SURGERY

## 2021-05-13 NOTE — PATIENT INSTRUCTIONS
Pendulum exercises: lean over table or countertop  Dangle your operative extremity in front of you  Use your body to swing the arm around back and forth and around in a [place]  Do not actively try to move the arm or lift the arm  Just use the momentum of your body to move the arm around  Perform this exercises 2-3 times per day for a few minutes each time

## 2021-05-13 NOTE — PROGRESS NOTES
Patient Name:  Catina Rai  MRN:  [id number]    Assessment     1  Nontraumatic complete tear of left rotator cuff         Plan     Surgical arthroscopy pictures were reviewed with the patient and his wife  The patient may remove the immobilizer and allow the arm to hand at his side  He may now put the immobilizer on over his clothes which will likely diminish rash on his abdomen  He may shower and allow water to run over his incisions  He may begin pendulum exercises and was instructed on how to do so today  We will see him back in 2 weeks for recheck  He was encouraged to contact the office should questions or concerns arise  Return in about 2 weeks (around 5/27/2021)  This patient was evaluated both by myself and Dr Star Mckeon  Subjective   Catina Rai returns for follow-up of left shoulder  The patient is 1 week(s) post arthroscopic shoulder debridement with open rotator cuff repair and returns for routine follow-up  Overall, he is doing well  He notes a slight rash from the shoulder immobilizer  He has discontinued use of narcotic pain medication on POD 3  He denies numbness and tingling  Objective     There were no vitals taken for this visit  The left shoulder exam demonstrates incisions to be well approximated  Sutures were removed from arthroscopic portals  No erythema, mild swelling as to be expected  No significant tenderness to palpation  Motor and sensory exams are grossly intact  +2 Radial pulse  Limb is warm and well perfused with good color and capillary refill of the digits  The remainder of the left upper extremity exam is benign         Data Review     No new imaging performed today          Leah Adams PA-C

## 2021-05-28 ENCOUNTER — OFFICE VISIT (OUTPATIENT)
Dept: OBGYN CLINIC | Facility: CLINIC | Age: 66
End: 2021-05-28

## 2021-05-28 VITALS
SYSTOLIC BLOOD PRESSURE: 124 MMHG | TEMPERATURE: 97.3 F | HEART RATE: 73 BPM | HEIGHT: 67 IN | DIASTOLIC BLOOD PRESSURE: 82 MMHG | WEIGHT: 201 LBS | BODY MASS INDEX: 31.55 KG/M2

## 2021-05-28 DIAGNOSIS — M75.122 NONTRAUMATIC COMPLETE TEAR OF LEFT ROTATOR CUFF: Primary | ICD-10-CM

## 2021-05-28 PROCEDURE — 99024 POSTOP FOLLOW-UP VISIT: CPT | Performed by: ORTHOPAEDIC SURGERY

## 2021-05-28 NOTE — PROGRESS NOTES
Patient Name:  Adriana Farley  MRN:  [id number]    Assessment     1  Nontraumatic complete tear of left rotator cuff  Ambulatory referral to Physical Therapy       Plan       Odette Adan should continue pendulum exercises and was shown additional passive range of motion exercises to perform daily  He did ask for a prescription for physical therapy as he feels that he may be overwhelmed with the passive exercises and be afraid to push himself  This was provided today  He will continue OTC analgesics as needed for pain control  He was provided a new shoulder immobilizer  We will see him back in 3 weeks for recheck  He was encouraged to contact the office should questions or concerns arise  Return in about 3 weeks (around 6/18/2021)  This patient was evaluated both by myself and Dr Denita Marquis  Subjective   Adriana Farley returns for follow-up of left shoulder  The patient is 3 week(s) post open RCR and returns for routine follow-up  He is doing well overall  He has been doing pendulum swings as instructed  He wears the immobilizer as instructed  Using OTC analgesics prn pain  Objective     /82   Pulse 73   Temp (!) 97 3 °F (36 3 °C) (Temporal)   Ht 5' 7" (1 702 m)   Wt 91 2 kg (201 lb)   BMI 31 48 kg/m²     The left shoulder exam demonstrates the shoulder immobilizer to be in place upon arrival   The incision is well approximated with glue and benign  No erythema, no active drainage  Mild shoulder swelling as to be expected postoperatively  Shoulder range of motion was deferred secondary to postoperative period  Motor and sensory exams are grossly intact, 2+ radial pulse  Limb is warm well perfused with good color and capillary refill of the digits  The remainder of the left upper extremity exam is benign        Data Review     No new imaging performed today          Leah Adams PA-C

## 2021-06-02 ENCOUNTER — EVALUATION (OUTPATIENT)
Dept: PHYSICAL THERAPY | Facility: CLINIC | Age: 66
End: 2021-06-02
Payer: COMMERCIAL

## 2021-06-02 DIAGNOSIS — M75.122 NONTRAUMATIC COMPLETE TEAR OF LEFT ROTATOR CUFF: ICD-10-CM

## 2021-06-02 DIAGNOSIS — Z98.890 S/P SHOULDER SURGERY: Primary | ICD-10-CM

## 2021-06-02 PROCEDURE — 97535 SELF CARE MNGMENT TRAINING: CPT | Performed by: PHYSICAL THERAPIST

## 2021-06-02 PROCEDURE — 97140 MANUAL THERAPY 1/> REGIONS: CPT | Performed by: PHYSICAL THERAPIST

## 2021-06-02 PROCEDURE — 97161 PT EVAL LOW COMPLEX 20 MIN: CPT | Performed by: PHYSICAL THERAPIST

## 2021-06-02 NOTE — PROGRESS NOTES
PT Evaluation     Today's date: 2021  Patient name: Radu Matos  : 1955  MRN: [id number]  Referring provider: Caron Richardson  Dx:   Encounter Diagnosis     ICD-10-CM    1  S/P shoulder surgery  Z98 890    2  Nontraumatic complete tear of left rotator cuff  M75 122 Ambulatory referral to Physical Therapy                  Assessment  Assessment details: PT notes the patient with decrease ROM and strength t/o the left shoulder and scapula with need for course of skilled therapy for 12 weeks with focus on scapular stabilization, posture, manual therapy, analgesic modalities, and HEP  PT will comply with MD orders of no active movement until cleared by MD to start strengthening and AAROM  PT will start with treatment of 1x week for PROM and progress to 2x/week once MD allows AROM/AAROM  Impairments: abnormal or restricted ROM, activity intolerance, impaired physical strength, lacks appropriate home exercise program, pain with function, scapular dyskinesis and poor posture   Understanding of Dx/Px/POC: good   Prognosis: good    Goals  ST  Initiate HEP  2  Decrease pain levels by 25-50%   3  Increase ROM by 25-50%   4  Increase strength by 1-2 mm grades  LT  Improve postural awareness  2  Improve scapular stability  3  Decrease limitations with reaching, carrying, and lifting  4  Decrease limitations with OH and push/pull activities  5  Decrease limitations with ADL  6  RTE partial/full Duty   7  DC with HEP     Plan  Plan details: PT notes review of POC and findings with patient who is in agreement with PT recommendations of course of skilled therapy      Patient would benefit from: PT eval and skilled physical therapy  Planned modality interventions: cryotherapy  Planned therapy interventions: manual therapy, neuromuscular re-education, patient education, self care, strengthening, stretching, therapeutic exercise, home exercise program, graded exercise and flexibility  Frequency: 2x week  Duration in weeks: 12  Treatment plan discussed with: patient        Subjective Evaluation    History of Present Illness  Date of surgery: 2021  Mechanism of injury: surgery  Mechanism of injury: Patient reports development of left shoulder pain about 6 months ago from insidious onset with progressive worsening over time leading to limitations with left shoulder movement and increase pain levels  Patient went to ortho MD for evaluation and was treated with analgesic injection and course of OPPT with minimal to no change in status so patient was evaluated by x-ray with findings with complete RC tear with need for surgical repair  Patient underwent the procedure on 21 and is now p/o with orders for OPPT  Patient reports he is presently OOW as  at [facility] with no set RTW date  Patient reports significant PMH of MI with cardiac stent, HBP, DM, GERD, macular degeneration and skin cancer     Pain  Current pain ratin  At best pain ratin  At worst pain rating: 10  Location: Left Shoulder   Quality: sharp and radiating  Relieving factors: rest and medications      Diagnostic Tests  MRI studies: abnormal  Treatments  Previous treatment: physical therapy, medication and injection treatment  Current treatment: immobilization and medication  Patient Goals  Patient goals for therapy: decreased pain, increased motion, increased strength, independence with ADLs/IADLs and return to work          Objective     Postural Observations  Seated posture: poor  Standing posture: poor    Additional Postural Observation Details  PT notes bilateral rounded shoulders, forward head and bilateral protracted scapula     Tenderness     Left Shoulder   No tenderness     Neurological Testing     Reflexes   Left   Biceps (C5/C6): normal (2+)  Brachioradialis (C6): normal (2+)    Right   Biceps (C5/C6): normal (2+)  Brachioradialis (C6): normal (2+)    Active Range of Motion Cervical/Thoracic Spine       Cervical    Flexion:  WFL  Extension:  WFL  Left lateral flexion:  WFL  Right lateral flexion:  WFL  Left rotation:  WFL  Right rotation:  WFL    Left Elbow   Normal active range of motion    Additional Active Range of Motion Details  NT secondary to recent surgery     Passive Range of Motion   Left Shoulder   Flexion: 94 degrees with pain  Extension: 31 degrees with pain  Abduction: 86 degrees with pain  External rotation 45°: 18 degrees with pain  Internal rotation 45°: 59 degrees with pain    Additional Passive Range of Motion Details  PT notes decrease ROM and strength t/o the left shoulder and scapula     Scapular Mobility   Left Shoulder   Scapular Dyskinesis: grade II  Scapular mobility: fair    Strength/Myotome Testing     Left Elbow   Flexion: 3+  Extension: 3+    Additional Strength Details  NT secondary to recent surgery              Precautions:  No AROM/AAROM until cleared by MD, PMH skin cancer and MI with cardiac stent      Manuals 6/2       Left shoulder and scapula mobs and stretching  15 min                                Neuro Re-Ed                                                                Ther Ex        Table slides-flex and scap        Table circles         Shoulder shrug        Scapular pinch into wall         Bent over row         Bicep curls         Pendulums-All Directions         Wrist rolls                                         HEP update/review  15 min        Ther Activity                        Gait Training                        Modalities        CP to the left shoulder and UB in seated 15 min

## 2021-06-09 ENCOUNTER — OFFICE VISIT (OUTPATIENT)
Dept: PHYSICAL THERAPY | Facility: CLINIC | Age: 66
End: 2021-06-09
Payer: COMMERCIAL

## 2021-06-09 DIAGNOSIS — M75.122 NONTRAUMATIC COMPLETE TEAR OF LEFT ROTATOR CUFF: Primary | ICD-10-CM

## 2021-06-09 DIAGNOSIS — Z98.890 S/P SHOULDER SURGERY: ICD-10-CM

## 2021-06-09 PROCEDURE — 97110 THERAPEUTIC EXERCISES: CPT | Performed by: PHYSICAL THERAPIST

## 2021-06-09 PROCEDURE — 97140 MANUAL THERAPY 1/> REGIONS: CPT | Performed by: PHYSICAL THERAPIST

## 2021-06-09 NOTE — PROGRESS NOTES
Daily Note     Today's date: 2021  Patient name: Archie Dover  : 1955  MRN: [id number]  Referring provider: Katie Rivera  Dx:   Encounter Diagnosis     ICD-10-CM    1  Nontraumatic complete tear of left rotator cuff  M75 122    2  S/P shoulder surgery  Z98 890                   Subjective:  Patient reports having some soreness in the left shoulder especially at night with pain levels of 4/10 levels but during the day pain levels are down to 1-2/10 level  Patient reports he is compliant with HEP  Objective: See treatment diary below      Assessment: Tolerated treatment well  PT notes start of POC with focus on scapular stabilization and manual therapy to decrease pain levels and improve functional limitations to meet therapy goals  PT notes continuation of decrease ROM and strength t/o the left shoulder and scapula with need for continuation of skilled therapy  Plan: Continue per plan of care        Precautions:  No AROM/AAROM until cleared by MD, PMH skin cancer and MI with cardiac stent      Manuals       Left shoulder and scapula mobs and stretching  15 min  15 min                              Neuro Re-Ed                                                                Ther Ex        Table slides-flex and scap  10x5" Hold Each       Table circles   10x Bilat  Small circles       Shoulder shrug  20x       Scapular pinch into wall   Scap pinch only   20x       Bent over row   15x Left only       Bicep curls   20x       Pendulums-All Directions   1 min U/D, S/S, Circles       Wrist rolls   Cane   20x Bilat                                       HEP update/review  15 min        Ther Activity                        Gait Training                        Modalities        CP to the left shoulder and UB in seated 15 min  15 min

## 2021-06-16 ENCOUNTER — OFFICE VISIT (OUTPATIENT)
Dept: PHYSICAL THERAPY | Facility: CLINIC | Age: 66
End: 2021-06-16
Payer: COMMERCIAL

## 2021-06-16 DIAGNOSIS — M75.122 NONTRAUMATIC COMPLETE TEAR OF LEFT ROTATOR CUFF: Primary | ICD-10-CM

## 2021-06-16 DIAGNOSIS — Z98.890 S/P SHOULDER SURGERY: ICD-10-CM

## 2021-06-16 PROCEDURE — 97140 MANUAL THERAPY 1/> REGIONS: CPT | Performed by: PHYSICAL THERAPIST

## 2021-06-16 PROCEDURE — 97110 THERAPEUTIC EXERCISES: CPT | Performed by: PHYSICAL THERAPIST

## 2021-06-16 NOTE — PROGRESS NOTES
Daily Note     Today's date: 2021  Patient name: Mark Stallworth  : 1955  MRN: [id number]  Referring provider: Kiara Pena  Dx:   Encounter Diagnosis     ICD-10-CM    1  Nontraumatic complete tear of left rotator cuff  M75 122    2  S/P shoulder surgery  Z98 890                   Subjective:  Patient reports the pain levels have been coming down slightly in the shoulder  Patient reports f/u with MD on  and is hoping to transition out of present sling to a new one for more support for increase support to the elbow and shoulder  Objective: See treatment diary below      Assessment: Tolerated treatment fair  PT notes continuation of progression of POC with focus on left shoulder flexibility and manual therapy to decrease pain levels and improve functional limitations to meet therapy goals  PT notes continuation of increase guarding of the left GH joint with all manual therapy leading to need for continuation of skilled therapy  Plan: Continue per plan of care        Precautions:  No AROM/AAROM until cleared by MD, PM skin cancer and MI with cardiac stent      Manuals      Left shoulder and scapula mobs and stretching  15 min  15 min 15 min                              Neuro Re-Ed                                                                Ther Ex        Table slides-flex and scap  10x5" Hold Each  10x5" Hold   Each      Table circles   10x Bilat  Small circles  2x10 Bilat   Small to Med circles      Shoulder shrug  20x  2x10      Scapular pinch into wall   Scap pinch only   20x  2x10   Pinch only      Bent over row   15x Left only  2x10 Left Only      Bicep curls   20x  2x10      Pendulums-All Directions   1 min U/D, S/S, Circles  1 min U/D, S/S, circles      Wrist rolls   Cane   20x Bilat  Cane   2x10 Bilat      Cane shoulder flex and ER    10x5" Hold   Each                              HEP update/review  15 min        Ther Activity Gait Training                        Modalities        CP to the left shoulder and UB in seated 15 min  15 min  15 min

## 2021-06-18 ENCOUNTER — OFFICE VISIT (OUTPATIENT)
Dept: OBGYN CLINIC | Facility: CLINIC | Age: 66
End: 2021-06-18

## 2021-06-18 VITALS
DIASTOLIC BLOOD PRESSURE: 80 MMHG | SYSTOLIC BLOOD PRESSURE: 130 MMHG | WEIGHT: 201 LBS | TEMPERATURE: 97.6 F | HEIGHT: 67 IN | BODY MASS INDEX: 31.55 KG/M2 | HEART RATE: 94 BPM

## 2021-06-18 DIAGNOSIS — M75.122 NONTRAUMATIC COMPLETE TEAR OF LEFT ROTATOR CUFF: Primary | ICD-10-CM

## 2021-06-18 PROCEDURE — 3008F BODY MASS INDEX DOCD: CPT | Performed by: ORTHOPAEDIC SURGERY

## 2021-06-18 PROCEDURE — 3008F BODY MASS INDEX DOCD: CPT | Performed by: NURSE PRACTITIONER

## 2021-06-18 PROCEDURE — 99024 POSTOP FOLLOW-UP VISIT: CPT | Performed by: ORTHOPAEDIC SURGERY

## 2021-06-18 NOTE — PROGRESS NOTES
Patient Name:  Lorena Saeed  MRN:  [id number]    Assessment     1  Nontraumatic complete tear of left rotator cuff  Ambulatory referral to Physical Therapy       Plan     1  I would recommend follow-up in 6 weeks  Physical therapy may now progress and a new note was provided  He may use a sling instead of the immobilizer  He is permitted to drive with precautions as discussed  A work note was provided for limited duty  He is to contact me if questions or concerns arise prior to follow-up in 6 weeks  Return in about 6 weeks (around 7/30/2021)  Subjective   Lorena Saeed returns for follow-up of his left shoulder  The patient is 6 week(s) post rotator cuff repair and returns for routine follow-up  Patient complains of persistent stiffness, mild pain and weakness of the left shoulder  Objective     /80   Pulse 94   Temp 97 6 °F (36 4 °C) (Temporal)   Ht 5' 7" (1 702 m)   Wt 91 2 kg (201 lb)   BMI 31 48 kg/m²     Exam demonstrates incision to be well-healed and benign  There is no erythema, drainage, rubor  He has no complaints during passive circumduction  I was able to abduct shoulder to about 45° passively and forward flex to about 60°  He has good strength of internal rotation  He does demonstrate active external rotation with mild impairment of strength  He has good strength of abduction  Excellent strength of extension was noted  Distal sensation is intact          Duane Yoo

## 2021-06-18 NOTE — LETTER
June 18, 2021     Patient: Oh Gomes   YOB: 1955   Date of Visit: 6/18/2021       To Whom it May Concern:    Oh Gomes is under my professional care  He was seen in my office on 6/18/2021  He may return to work on 06/21/2021  He is permitted to use the right upper extremity without limitation  He is not permitted to use the left upper extremity for active activities but may use the left upper extremity as an assist   He is not permitted any lifting with the left upper extremity  If you have any questions or concerns, please don't hesitate to call           Sincerely,          Peg Espitia        CC: No Recipients

## 2021-06-21 ENCOUNTER — OFFICE VISIT (OUTPATIENT)
Dept: PHYSICAL THERAPY | Facility: CLINIC | Age: 66
End: 2021-06-21
Payer: COMMERCIAL

## 2021-06-21 DIAGNOSIS — M75.122 NONTRAUMATIC COMPLETE TEAR OF LEFT ROTATOR CUFF: Primary | ICD-10-CM

## 2021-06-21 DIAGNOSIS — Z98.890 S/P SHOULDER SURGERY: ICD-10-CM

## 2021-06-21 PROCEDURE — 97140 MANUAL THERAPY 1/> REGIONS: CPT | Performed by: PHYSICAL THERAPIST

## 2021-06-21 PROCEDURE — 97110 THERAPEUTIC EXERCISES: CPT | Performed by: PHYSICAL THERAPIST

## 2021-06-21 NOTE — PROGRESS NOTES
Daily Note     Today's date: 2021  Patient name: Nhan Carballo  : 1955  MRN: [id number]  Referring provider: Sandeep Gamboa  Dx:   Encounter Diagnosis     ICD-10-CM    1  Nontraumatic complete tear of left rotator cuff  M75 122    2  S/P shoulder surgery  Z98 890                   Subjective:  Patient reports verbal understanding of new POC with MD   Patient reports the shoulder is still sore to 4/10 level  Objective: See treatment diary below      Assessment: Tolerated treatment well  PT notes updated POC with MD progression with POC with AROM in flexion, extension and IR but no AROM over 45 degrees for ER and ABD but no strengthening of ER and ABD for 6 weeks  PT will update POC and progress as tolerated by patient  Plan: Continue per plan of care        Precautions:  AROM of flexion, extension and IR, no AROM over 45 degrees for ABD and ER, no active strengthening of ER and ABD, PMH skin cancer and MI with cardiac stent      Manuals     Left shoulder and scapula mobs and stretching  15 min  15 min 15 min  15 min                             Neuro Re-Ed        Bent over row   2x10 2x10  2x10     Scapular pinch   2x10   3" Hold  2x10   3" Hold  2x10   3" Hold                                             Ther Ex        Table slides-flex and scap  10x5" Hold Each  10x5" Hold   Each  10x5" Hold   Each     Table circles   10x Bilat  Small circles  2x10 Bilat   Small to Med circles  2x10 Bilat  Small to Med circles     Shoulder shrug  20x  2x10  HEP     UBE     6 min   120 resist  Alt             Bicep curls   20x  2x10  HEP     Pendulums-All Directions   1 min U/D, S/S, Circles  1 min U/D, S/S, circles  HEP     Wrist rolls   Cane   20x Bilat  Cane   2x10 Bilat  DC    Cane shoulder flex and ER    10x5" Hold   Each  10x5" Hold   Each     Pulleys Flex     4 min     Supine press and flex with cane     10x3" Hold             HEP update/review  15 min        Ther Activity Gait Training                        Modalities        CP to the left shoulder and UB in seated 15 min  15 min  15 min  15 min

## 2021-06-23 ENCOUNTER — OFFICE VISIT (OUTPATIENT)
Dept: PHYSICAL THERAPY | Facility: CLINIC | Age: 66
End: 2021-06-23
Payer: COMMERCIAL

## 2021-06-23 DIAGNOSIS — Z98.890 S/P SHOULDER SURGERY: ICD-10-CM

## 2021-06-23 DIAGNOSIS — M75.122 NONTRAUMATIC COMPLETE TEAR OF LEFT ROTATOR CUFF: Primary | ICD-10-CM

## 2021-06-23 PROCEDURE — 97140 MANUAL THERAPY 1/> REGIONS: CPT | Performed by: PHYSICAL THERAPIST

## 2021-06-23 PROCEDURE — 97110 THERAPEUTIC EXERCISES: CPT | Performed by: PHYSICAL THERAPIST

## 2021-06-23 PROCEDURE — 97112 NEUROMUSCULAR REEDUCATION: CPT | Performed by: PHYSICAL THERAPIST

## 2021-06-23 NOTE — PROGRESS NOTES
Daily Note     Today's date: 2021  Patient name: Mark Stallworth  : 1955  MRN: [id number]  Referring provider: Kiara Pena  Dx:   Encounter Diagnosis     ICD-10-CM    1  Nontraumatic complete tear of left rotator cuff  M75 122    2  S/P shoulder surgery  Z98 890                   Subjective:  Patient reports the shoulder continues with soreness and limited movement  Patient reports 3/10 soreness in the left shoulder and UB  Objective: See treatment diary below      Assessment: Tolerated treatment well  PT notes continuation of progression of POC with focus on scapular stabilization and manual therapy to decrease pain levels and improve functional limitations to meet therapy goals  PT notes continuation of decrease ROM and strength t/o the left shoulder and scapula with need for continuation of skilled therapy  Plan: Continue per plan of care        Precautions:  AROM of flexion, extension and IR, no AROM over 45 degrees for ABD and ER, no active strengthening of ER and ABD, PMH skin cancer and MI with cardiac stent      Manuals    Left shoulder and scapula mobs and stretching  15 min  15 min 15 min  15 min  15 min                            Neuro Re-Ed        Bent over row   2x10 2x10  2x10  10x  3#    Scapular pinch   2x10   3" Hold  2x10   3" Hold  2x10   3" Hold  10x   Against Wall    Shoulder Flexion Wall Slides      10x3" Hold                                    Ther Ex        Table slides-flex and scap  10x5" Hold Each  10x5" Hold   Each  10x5" Hold   Each  HEP    Table circles   10x Bilat  Small circles  2x10 Bilat   Small to Med circles  2x10 Bilat  Small to Med circles  HEP    Shoulder shrug  20x  2x10  HEP     UBE     6 min   120 resist  Alt  8 min   120 resist   Alt    TB MTP and LTP      10x Each   Red    Bicep curls   20x  2x10  HEP     Pendulums-All Directions   1 min U/D, S/S, Circles  1 min U/D, S/S, circles  HEP     Wrist rolls   Cane   20x Bilat  Cane   2x10 Bilat  DC    Cane shoulder flex and ER    10x5" Hold   Each  10x5" Hold   Each  10x5" Hold Each    Pulleys Flex     4 min  4 min    Supine press and flex with cane     10x3" Hold  10x3" Hold            HEP update/review  15 min        Ther Activity                        Gait Training                        Modalities        CP to the left shoulder and UB in seated 15 min  15 min  15 min  15 min  15 min

## 2021-06-28 ENCOUNTER — EVALUATION (OUTPATIENT)
Dept: PHYSICAL THERAPY | Facility: CLINIC | Age: 66
End: 2021-06-28
Payer: COMMERCIAL

## 2021-06-28 DIAGNOSIS — M75.122 NONTRAUMATIC COMPLETE TEAR OF LEFT ROTATOR CUFF: Primary | ICD-10-CM

## 2021-06-28 DIAGNOSIS — Z98.890 S/P SHOULDER SURGERY: ICD-10-CM

## 2021-06-28 PROCEDURE — 97112 NEUROMUSCULAR REEDUCATION: CPT | Performed by: PHYSICAL THERAPIST

## 2021-06-28 PROCEDURE — 97110 THERAPEUTIC EXERCISES: CPT | Performed by: PHYSICAL THERAPIST

## 2021-06-28 PROCEDURE — 97140 MANUAL THERAPY 1/> REGIONS: CPT | Performed by: PHYSICAL THERAPIST

## 2021-06-28 NOTE — PROGRESS NOTES
PT Re-Evaluation     Today's date: 2021  Patient name: Sarah Baker  : 1955  MRN: [id number]  Referring provider: Ivory Burton  Dx:   Encounter Diagnosis     ICD-10-CM    1  Nontraumatic complete tear of left rotator cuff  M75 122    2  S/P shoulder surgery  Z98 890                   Assessment  Assessment details: PT notes the patient with continuation of decrease ROM and strength t/o the left shoulder and scapula with need for continuation of skilled therapy for 8 weeks with focus on scapular stabilization, posture, manual therapy, analgesic modalities, and update/review of HEP  PT will comply with MD orders of no active movement of left shoulder ABD and ER with no ROM over 45 degrees  Impairments: abnormal or restricted ROM, activity intolerance, impaired physical strength, lacks appropriate home exercise program, pain with function, scapular dyskinesis and poor posture   Understanding of Dx/Px/POC: good   Prognosis: good    Goals  ST  Initiate HEP-MET  2  Decrease pain levels by 25-50%-Partial MET   3  Increase ROM by 25-50%-Partial MET  4  Increase strength by 1-2 mm grades-Partial MET  LT  Improve postural awareness-Partial MET  2  Improve scapular stability-Partial MET  3  Decrease limitations with reaching, carrying, and lifting-NOT MET  4  Decrease limitations with OH and push/pull activities-NOT MET  5  Decrease limitations with ADL-Partial MET  6  RTE partial/full Duty-NOT MET   7   DC with HEP-Progressing     Plan  Plan details: PT notes review of POC and findings with patient who is in agreement with PT recommendations of continuation of skilled therapy      Patient would benefit from: skilled physical therapy  Planned modality interventions: cryotherapy  Planned therapy interventions: manual therapy, neuromuscular re-education, patient education, self care, strengthening, stretching, therapeutic exercise, home exercise program, graded exercise and flexibility  Frequency: 2x week  Duration in weeks: 8  Treatment plan discussed with: patient        Subjective Evaluation    History of Present Illness  Date of surgery: 2021  Mechanism of injury: surgery  Mechanism of injury: Patient reports development of left shoulder pain about 6 months ago from insidious onset with progressive worsening over time leading to limitations with left shoulder movement and increase pain levels  Patient went to ortho MD for evaluation and was treated with analgesic injection and course of OPPT with minimal to no change in status so patient was evaluated by x-ray with findings with complete RC tear with need for surgical repair  Patient underwent the procedure on 21 and is now p/o with orders for OPPT  Patient reports he is presently OOW as  at [facility] with no set RTW date  Patient reports significant PMH of MI with cardiac stent, HBP, DM, GERD, macular degeneration and skin cancer  Presently the patient has attended 6 sessions of skilled therapy and feels 50% improvement since the start of therapy with some decrease in pain levels but continuation of weakness with limited movement of the left shoulder and UE    Patient feels he requires more therapy to continue to get stronger and more movement in the left shoulder so he can RTW full duty and return to pre-injury status  Pain  Current pain rating: 3  At best pain ratin  At worst pain ratin  Location: Left Shoulder   Quality: sharp and radiating  Relieving factors: rest and medications  Progression: improved      Diagnostic Tests  MRI studies: abnormal  Treatments  Previous treatment: physical therapy, medication and injection treatment  Current treatment: immobilization and medication  Patient Goals  Patient goals for therapy: decreased pain, increased motion, increased strength, independence with ADLs/IADLs and return to work          Objective     Postural Observations  Seated posture: fair  Standing posture: fair    Additional Postural Observation Details  PT notes bilateral rounded shoulders, forward head and bilateral protracted scapula     Tenderness     Left Shoulder   No tenderness     Neurological Testing     Reflexes   Left   Biceps (C5/C6): normal (2+)  Brachioradialis (C6): normal (2+)    Right   Biceps (C5/C6): normal (2+)  Brachioradialis (C6): normal (2+)    Active Range of Motion   Cervical/Thoracic Spine       Cervical    Flexion:  WFL  Extension:  WFL  Left lateral flexion:  WFL  Right lateral flexion:  WFL  Left rotation:  WFL  Right rotation:  WFL    Left Elbow   Normal active range of motion    Additional Active Range of Motion Details  NT secondary to recent surgery     Passive Range of Motion   Left Shoulder   Flexion: 137 degrees with pain  Extension: WFL and with pain  Abduction: 124 degrees with pain  External rotation 45°: 31 degrees with pain  Internal rotation 45°: 57 degrees with pain    Additional Passive Range of Motion Details  PT notes continuation of decrease ROM and strength t/o the left shoulder and scapula     Scapular Mobility   Left Shoulder   Scapular Dyskinesis: grade II  Scapular mobility: good    Strength/Myotome Testing     Left Elbow   Flexion: 4-  Extension: 4    Additional Strength Details  NT secondary to recent surgery              Precautions:  AROM of flexion, extension and IR, no AROM over 45 degrees for ABD and ER, no active strengthening of ER and ABD, PMH skin cancer and MI with cardiac stent       Manuals 6/9 6/16 6/21 6/23 6/28   Left shoulder and scapula mobs and stretching  15 min 15 min  15 min  15 min  15 min                            Neuro Re-Ed        Bent over row  2x10 2x10  2x10  10x  3#  2x10   5#    Scapular pinch  2x10   3" Hold  2x10   3" Hold  2x10   3" Hold  10x   Against Wall  10x Against Wall    Shoulder Flexion Wall Slides     10x3" Hold  10x5" Hold    TB IR      15x Red    Wall push-up      10x                    Ther Ex Table slides-flex and scap 10x5" Hold Each  10x5" Hold   Each  10x5" Hold   Each  HEP     Table circles  10x Bilat  Small circles  2x10 Bilat   Small to Med circles  2x10 Bilat  Small to Med circles  HEP     Shoulder shrug 20x  2x10  HEP      UBE    6 min   120 resist  Alt  8 min   120 resist   Alt  10 min   120 resist Alt    TB MTP and LTP     10x Each   Red  15x Each   Red    Bicep curls  20x  2x10  HEP      Pendulums-All Directions  1 min U/D, S/S, Circles  1 min U/D, S/S, circles  HEP      Wrist rolls  Cane   20x Bilat  Cane   2x10 Bilat  DC     Cane shoulder flex and ER   10x5" Hold   Each  10x5" Hold   Each  10x5" Hold Each  NT   Pulleys Flex    4 min  4 min  4 min    Supine press and flex with cane    10x3" Hold  10x3" Hold  DC   TB Punch      10x Red                                    HEP update/review         Ther Activity                        Gait Training                        Modalities        CP to the left shoulder and UB in seated 15 min  15 min  15 min  15 min  15 min

## 2021-06-30 ENCOUNTER — OFFICE VISIT (OUTPATIENT)
Dept: PHYSICAL THERAPY | Facility: CLINIC | Age: 66
End: 2021-06-30
Payer: COMMERCIAL

## 2021-06-30 DIAGNOSIS — M75.122 NONTRAUMATIC COMPLETE TEAR OF LEFT ROTATOR CUFF: Primary | ICD-10-CM

## 2021-06-30 DIAGNOSIS — Z98.890 S/P SHOULDER SURGERY: ICD-10-CM

## 2021-06-30 PROCEDURE — 97110 THERAPEUTIC EXERCISES: CPT | Performed by: PHYSICAL THERAPIST

## 2021-06-30 PROCEDURE — 97140 MANUAL THERAPY 1/> REGIONS: CPT | Performed by: PHYSICAL THERAPIST

## 2021-06-30 PROCEDURE — 97112 NEUROMUSCULAR REEDUCATION: CPT | Performed by: PHYSICAL THERAPIST

## 2021-06-30 NOTE — PROGRESS NOTES
Daily Note     Today's date: 2021  Patient name: Juani Abreu  : 1955  MRN: [id number]  Referring provider: Warden Saez  Dx:   Encounter Diagnosis     ICD-10-CM    1  Nontraumatic complete tear of left rotator cuff  M75 122    2  S/P shoulder surgery  Z98 890                   Subjective:  Patient reports overall pain levels have decreased but continuation of decrease AROM and strength t/o the left shoulder and UE  Objective: See treatment diary below      Assessment: Tolerated treatment well  PT notes continuation of progression of POC with focus on scapular stabilization and manual therapy to decrease pain levels and improve functional limitations to meet therapy goals  PT notes continuation of decrease ROM and strength t/o the left shoulder and scapula with need for continuation of skilled therapy  Plan: Continue per plan of care        Precautions:  AROM of flexion, extension and IR, no AROM over 45 degrees for ABD and ER, no active strengthening of ER and ABD, PMH skin cancer and MI with cardiac stent       Manuals    Left shoulder and scapula mobs and stretching  15 min  15 min  15 min  15 min  15 min                            Neuro Re-Ed        Bent over row  2x10  2x10  10x  3#  2x10   5#  2x10   5#    Scapular pinch  2x10   3" Hold  2x10   3" Hold  10x   Against Wall  10x Against Wall  10x3" Hold   Against wall    Shoulder Flexion Wall Slides    10x3" Hold  10x5" Hold  15x5" Hold    TB IR     15x Red  2x10 Red    Wall push-up     10x  2x10                    Ther Ex        Table slides-flex and scap 10x5" Hold   Each  10x5" Hold   Each  HEP      Table circles  2x10 Bilat   Small to Med circles  2x10 Bilat  Small to Med circles  HEP      Shoulder shrug 2x10  HEP       UBE   6 min   120 resist  Alt  8 min   120 resist   Alt  10 min   120 resist Alt  10 min   120 resist  Alt    TB MTP and LTP    10x Each   Red  15x Each   Red  2x10 Each   Santa Chris Bicep curls  2x10  HEP       Pendulums-All Directions  1 min U/D, S/S, circles  HEP       Wrist rolls  Cane   2x10 Bilat  DC      Cane shoulder flex and ER  10x5" Hold   Each  10x5" Hold   Each  10x5" Hold Each  NT 10x5" Hold   Each    Pulleys Flex   4 min  4 min  4 min  4 min    Supine press and flex with cane   10x3" Hold  10x3" Hold  DC    TB Punch     10x Red  2x10 Red                                    HEP update/review         Ther Activity                        Gait Training                        Modalities        CP to the left shoulder and UB in seated 15 min  15 min  15 min  15 min  15 min

## 2021-07-06 ENCOUNTER — TELEPHONE (OUTPATIENT)
Dept: OBGYN CLINIC | Facility: HOSPITAL | Age: 66
End: 2021-07-06

## 2021-07-06 NOTE — TELEPHONE ENCOUNTER
Patient called stating that a records request for an extension for disability was denied because we don't have an authorization form on file  However records were sent for the original form  Extension was scanned on 6/28 into [employer]  Please call patient back with update

## 2021-07-08 ENCOUNTER — OFFICE VISIT (OUTPATIENT)
Dept: PHYSICAL THERAPY | Facility: CLINIC | Age: 66
End: 2021-07-08
Payer: COMMERCIAL

## 2021-07-08 DIAGNOSIS — M75.122 NONTRAUMATIC COMPLETE TEAR OF LEFT ROTATOR CUFF: Primary | ICD-10-CM

## 2021-07-08 DIAGNOSIS — Z98.890 S/P SHOULDER SURGERY: ICD-10-CM

## 2021-07-08 PROCEDURE — 97140 MANUAL THERAPY 1/> REGIONS: CPT | Performed by: PHYSICAL THERAPIST

## 2021-07-08 PROCEDURE — 97112 NEUROMUSCULAR REEDUCATION: CPT | Performed by: PHYSICAL THERAPIST

## 2021-07-08 NOTE — TELEPHONE ENCOUNTER
Called Patient and he stated everything went through and he is a approved until next appointment   Thank you

## 2021-07-08 NOTE — PROGRESS NOTES
Daily Note     Today's date: 2021  Patient name: Theron Hooks  : 1955  MRN: [id number]  Referring provider: Madeleine Cooper  Dx:   Encounter Diagnosis     ICD-10-CM    1  Nontraumatic complete tear of left rotator cuff  M75 122    2  S/P shoulder surgery  Z98 890                   Subjective:  Patient reports he bumped into a wall earlier in the day leading to increase soreness in the left shoulder to 2/10 level  Objective: See treatment diary below      Assessment: Tolerated treatment well  PT notes continuation of progression of POC with focus on scapular stabilization and manual therapy to decrease pain levels and improve functional limitations to meet therapy goals  PT notes continuation of decrease ROM and strength t/o the left shoulder and scapula with need for continuation of skilled therapy  Plan: Continue per plan of care        Precautions:  AROM of flexion, extension and IR, no AROM over 45 degrees for ABD and ER, no active strengthening of ER and ABD, PMH skin cancer and MI with cardiac stent       Manuals  7/8   Left shoulder and scapula mobs and stretching  15 min  15 min  15 min  15 min  15 min                            Neuro Re-Ed        Bent over row  2x10  10x  3#  2x10   5#  2x10   5#  2x10   5#    Scapular pinch  2x10   3" Hold  10x   Against Wall  10x Against Wall  10x3" Hold   Against wall  HEP    Shoulder Flexion Wall Slides   10x3" Hold  10x5" Hold  15x5" Hold  15x5" Hold    TB IR    15x Red  2x10 Red  2x10 Green    Wall push-up    10x  2x10  2x10                    Ther Ex        Table slides-flex and scap 10x5" Hold   Each  HEP       Table circles  2x10 Bilat  Small to Med circles  HEP       Shoulder shrug HEP        UBE  6 min   120 resist  Alt  8 min   120 resist   Alt  10 min   120 resist Alt  10 min   120 resist  Alt  10 min   100 resist  Alt    TB MTP and LTP   10x Each   Red  15x Each   Red  2x10 Each   Green  2x15   Each   Ricarda Mt Bicep curls  HEP        Pendulums-All Directions  HEP        Wrist rolls  DC       Cane shoulder flex and ER  10x5" Hold   Each  10x5" Hold Each  NT 10x5" Hold   Each  10x5" Hold   Each    Pulleys Flex  4 min  4 min  4 min  4 min  4 min    Supine press and flex with cane  10x3" Hold  10x3" Hold  DC     TB Punch    10x Red  2x10 Red  2x10 Green                                    HEP update/review         Ther Activity                        Gait Training                        Modalities        CP to the left shoulder and UB in seated 15 min  15 min  15 min  15 min  15 min

## 2021-07-09 ENCOUNTER — OFFICE VISIT (OUTPATIENT)
Dept: PHYSICAL THERAPY | Facility: CLINIC | Age: 66
End: 2021-07-09
Payer: COMMERCIAL

## 2021-07-09 DIAGNOSIS — M75.122 NONTRAUMATIC COMPLETE TEAR OF LEFT ROTATOR CUFF: Primary | ICD-10-CM

## 2021-07-09 DIAGNOSIS — Z98.890 S/P SHOULDER SURGERY: ICD-10-CM

## 2021-07-09 PROCEDURE — 97112 NEUROMUSCULAR REEDUCATION: CPT | Performed by: PHYSICAL THERAPIST

## 2021-07-09 PROCEDURE — 97140 MANUAL THERAPY 1/> REGIONS: CPT | Performed by: PHYSICAL THERAPIST

## 2021-07-09 NOTE — PROGRESS NOTES
Daily Note     Today's date: 2021  Patient name: Veronica Jay  : 1955  MRN: [id number]  Referring provider: Suellen Doan  Dx:   Encounter Diagnosis     ICD-10-CM    1  Nontraumatic complete tear of left rotator cuff  M75 122    2  S/P shoulder surgery  Z98 890                   Subjective:  Patient reports continuation of difficulty with sleep at night with laying on the shoulder but overall feels the shoulder is moving better  Patient reports 2/10 soreness in the left shoulder t/o the session  Objective: See treatment diary below      Assessment: Tolerated treatment well  PT notes continuation of progression of POC with focus on scapular stabilization and manual therapy to decrease pain levels and improve functional limitations to meet therapy goals  PT notes continuation of decrease ROM ad strength t/o the left shoulder and scapula with need for continuation of skilled therapy  Plan: Continue per plan of care        Precautions:  AROM of flexion, extension and IR, no AROM over 45 degrees for ABD and ER, no active strengthening of ER and ABD, PMH skin cancer and MI with cardiac stent       Manuals  7 7/9   Left shoulder and scapula mobs and stretching  15 min  15 min  15 min  15 min  15 min                            Neuro Re-Ed        Bent over row  10x  3#  2x10   5#  2x10   5#  2x10   5#  10x  8#  10x  12#    Scapular pinch  10x   Against Wall  10x Against Wall  10x3" Hold   Against wall  HEP     Shoulder Flexion Wall Slides  10x3" Hold  10x5" Hold  15x5" Hold  15x5" Hold  15x5" Hold    TB IR   15x Red  2x10 Red  2x10 Green  2x10 Green    Wall push-up   10x  2x10  2x10  2x10    Supine stick press and flex      10x   2#            Ther Ex        Table slides-flex and scap HEP        Table circles  HEP        Shoulder shrug        UBE  8 min   120 resist   Alt  10 min   120 resist Alt  10 min   120 resist  Alt  10 min   100 resist  Alt  10 min   100 Resist  Alt    TB MTP and LTP  10x Each   Red  15x Each   Red  2x10 Each   Green  2x15   Each   Green  2x15 Each  Green   Bicep curls         Pendulums-All Directions         Wrist rolls         Cane shoulder flex and ER  10x5" Hold Each  NT 10x5" Hold   Each  10x5" Hold   Each  HEP   Pulleys Flex  4 min  4 min  4 min  4 min  4 min    Supine press and flex with cane  10x3" Hold  DC      TB Punch   10x Red  2x10 Red  2x10 Green  2x10 Green                                    HEP update/review         Ther Activity                        Gait Training                        Modalities        CP to the left shoulder and UB in seated 15 min  15 min  15 min  15 min  15 min

## 2021-07-12 ENCOUNTER — APPOINTMENT (OUTPATIENT)
Dept: PHYSICAL THERAPY | Facility: CLINIC | Age: 66
End: 2021-07-12
Payer: COMMERCIAL

## 2021-07-13 ENCOUNTER — OFFICE VISIT (OUTPATIENT)
Dept: PHYSICAL THERAPY | Facility: CLINIC | Age: 66
End: 2021-07-13
Payer: COMMERCIAL

## 2021-07-13 DIAGNOSIS — M75.122 NONTRAUMATIC COMPLETE TEAR OF LEFT ROTATOR CUFF: Primary | ICD-10-CM

## 2021-07-13 DIAGNOSIS — Z98.890 S/P SHOULDER SURGERY: ICD-10-CM

## 2021-07-13 PROCEDURE — 97110 THERAPEUTIC EXERCISES: CPT | Performed by: PHYSICAL THERAPIST

## 2021-07-13 PROCEDURE — 97112 NEUROMUSCULAR REEDUCATION: CPT | Performed by: PHYSICAL THERAPIST

## 2021-07-13 PROCEDURE — 97140 MANUAL THERAPY 1/> REGIONS: CPT | Performed by: PHYSICAL THERAPIST

## 2021-07-13 NOTE — PROGRESS NOTES
Daily Note     Today's date: 2021  Patient name: Robert Levine  : 1955  MRN: [id number]  Referring provider: Hanna Whitley  Dx:   Encounter Diagnosis     ICD-10-CM    1  Nontraumatic complete tear of left rotator cuff  M75 122    2  S/P shoulder surgery  Z98 890                   Subjective:  Patient reports minimal soreness in the left shoulder to 2/10 level  Objective: See treatment diary below      Assessment: Tolerated treatment well  PT notes continuation of progression of POC with focus on scapular stabilization and manual therapy to decrease pain levels and improve functional limitations to meet therapy goals  PT notes continuation of decrease ROM and strength t/o the left shoulder and scapula with need for continuation of skilled therapy  Plan: Continue per plan of care        Precautions:  AROM of flexion, extension and IR, no AROM over 45 degrees for ABD and ER, no active strengthening of ER and ABD, PMH skin cancer and MI with cardiac stent       Manuals    Left shoulder and scapula mobs and stretching  15 min  15 min  15 min  15 min  15 min                            Neuro Re-Ed        Bent over row  2x10   5#  2x10   5#  2x10   5#  10x 8#  10x 12#  10x 8#  10x 12#   Scapular pinch  10x Against Wall  10x3" Hold   Against wall  HEP      Shoulder Flexion Wall Slides  10x5" Hold  15x5" Hold  15x5" Hold  15x5" Hold  15x5" Hold    TB IR  15x Red  2x10 Red  2x10 Green  2x10 Green  2x10 Green    Wall push-up  10x  2x10  2x10  2x10  2x10    Supine stick press and flex     10x   2#  10x  3#           Ther Ex        Table slides-flex and scap        Table circles         Shoulder shrug        UBE  10 min   120 resist Alt  10 min   120 resist  Alt  10 min   100 resist  Alt  10 min   100 Resist  Alt  10 min   100 resist  Alt    TB MTP and LTP  15x Each   Red  2x10 Each   Green  2x15   Each   Green  2x15 Each  Green 2x15 Each   Green    Bicep curls Pendulums-All Directions         Wrist rolls         Cane shoulder flex and ER  NT 10x5" Hold   Each  10x5" Hold   Each  HEP    Pulleys Flex  4 min  4 min  4 min  4 min  4 min    Supine press and flex with cane  DC       TB Punch  10x Red  2x10 Red  2x10 Green  2x10 Green  2x15 Green                                    HEP update/review         Ther Activity                        Gait Training                        Modalities        CP to the left shoulder and UB in seated 15 min  15 min  15 min  15 min  15 min

## 2021-07-15 ENCOUNTER — OFFICE VISIT (OUTPATIENT)
Dept: PHYSICAL THERAPY | Facility: CLINIC | Age: 66
End: 2021-07-15
Payer: COMMERCIAL

## 2021-07-15 DIAGNOSIS — M75.122 NONTRAUMATIC COMPLETE TEAR OF LEFT ROTATOR CUFF: Primary | ICD-10-CM

## 2021-07-15 DIAGNOSIS — Z98.890 S/P SHOULDER SURGERY: ICD-10-CM

## 2021-07-15 PROCEDURE — 97112 NEUROMUSCULAR REEDUCATION: CPT | Performed by: PHYSICAL THERAPIST

## 2021-07-15 PROCEDURE — 97140 MANUAL THERAPY 1/> REGIONS: CPT | Performed by: PHYSICAL THERAPIST

## 2021-07-15 NOTE — PROGRESS NOTES
Daily Note     Today's date: 7/15/2021  Patient name: Marychuy Garcia  : 1955  MRN: [id number]  Referring provider: Jayna Coleman  Dx:   Encounter Diagnosis     ICD-10-CM    1  Nontraumatic complete tear of left rotator cuff  M75 122    2  S/P shoulder surgery  Z98 890                   Subjective:  Patient reports he has been trying to stop taking Tylenol and states the shoulder is sore to 2/10 level  Objective: See treatment diary below      Assessment: Tolerated treatment well  PT notes continuation of progression of POC with focus on scapular stabilization and manual therapy to decrease pain levels and improve functional limitations to meet therapy goals  PT notes continuation of decrease ROM and strength t/o the left shoulder and scapula with need for continuation of skilled therapy  Plan: Continue per plan of care        Precautions:  AROM of flexion, extension and IR, no AROM over 45 degrees for ABD and ER, no active strengthening of ER and ABD, PMH skin cancer and MI with cardiac stent       Manuals 6/30 7/8 7/9 7/13 7/15   Left shoulder and scapula mobs and stretching  15 min  15 min  15 min  15 min  15 min                            Neuro Re-Ed        Bent over row  2x10   5#  2x10   5#  10x 8#  10x 12#  10x 8#  10x 12# 10x 8#  10x 12#    Scapular pinch  10x3" Hold   Against wall  HEP       Shoulder Flexion Wall Slides  15x5" Hold  15x5" Hold  15x5" Hold  15x5" Hold  IYT  10x Each    TB IR  2x10 Red  2x10 Green  2x10 Green  2x10 Green  2x10 Blue    Wall push-up  2x10  2x10  2x10  2x10  2x10    Supine stick press and flex    10x   2#  10x  3# 10x  3#   S/L ER and ABD      15x Each                            Ther Ex        Table slides-flex and scap        Table circles         Shoulder shrug        UBE  10 min   120 resist  Alt  10 min   100 resist  Alt  10 min   100 Resist  Alt  10 min   100 resist  Alt  10 min   90 resist   Alt    TB MTP and LTP  2x10 Each   Green  2x15 Each   Sunday Sydnee  2x15 Each  Green 2x15 Each   Green  2x10 Each Blue    Bicep curls         Pendulums-All Directions         Wrist rolls         Cane shoulder flex and ER  10x5" Hold   Each  10x5" Hold   Each  HEP     Pulleys Flex  4 min  4 min  4 min  4 min  DC   Supine press and flex with cane         TB Punch  2x10 Red  2x10 Green  2x10 Green  2x15 Green  2x15 Green                                    HEP update/review         Ther Activity                        Gait Training                        Modalities        CP to the left shoulder and UB in seated 15 min  15 min  15 min  15 min  15 min

## 2021-07-19 ENCOUNTER — OFFICE VISIT (OUTPATIENT)
Dept: PHYSICAL THERAPY | Facility: CLINIC | Age: 66
End: 2021-07-19
Payer: COMMERCIAL

## 2021-07-19 DIAGNOSIS — Z98.890 S/P SHOULDER SURGERY: ICD-10-CM

## 2021-07-19 DIAGNOSIS — M75.122 NONTRAUMATIC COMPLETE TEAR OF LEFT ROTATOR CUFF: Primary | ICD-10-CM

## 2021-07-19 PROCEDURE — 97112 NEUROMUSCULAR REEDUCATION: CPT | Performed by: PHYSICAL THERAPIST

## 2021-07-19 PROCEDURE — 97140 MANUAL THERAPY 1/> REGIONS: CPT | Performed by: PHYSICAL THERAPIST

## 2021-07-19 NOTE — PROGRESS NOTES
Daily Note     Today's date: 2021  Patient name: Chelsey Reed  : 1955  MRN: [id number]  Referring provider: Kelby Gonzalez  Dx:   Encounter Diagnosis     ICD-10-CM    1  Nontraumatic complete tear of left rotator cuff  M75 122    2  S/P shoulder surgery  Z98 890                   Subjective:  Patient reports he tripped over the weekend and jaelyn the left shoulder leading to increase soreness to 5/10 level  Patient reports the symptoms have calmed down to 2/10 level today  Objective: See treatment diary below      Assessment: Tolerated treatment well  PT notes continuation of progression of POC with focus on scapular stabilization and manual therapy to decrease pain levels and improve functional limitations to meet therapy goals  PT notes the patient with continuation of decrease ROM and strength t/o the left shoulder and scapula with need for continuation of skilled therapy  Plan: Continue per plan of care        Precautions:  AROM of flexion, extension and IR, no AROM over 45 degrees for ABD and ER, no active strengthening of ER and ABD, PMH skin cancer and MI with cardiac stent       Manuals 7/8 7/9 7/13 7/15 7/19   Left shoulder and scapula mobs and stretching  15 min  15 min  15 min  15 min  15 min                            Neuro Re-Ed        Bent over row  2x10   5#  10x 8#  10x 12#  10x 8#  10x 12# 10x 8#  10x 12#  DC   Scapular pinch  HEP        Shoulder Flexion Wall Slides  15x5" Hold  15x5" Hold  15x5" Hold  IYT  10x Each  IYT  2x10 Each    TB IR  2x10 Green  2x10 Green  2x10 Green  2x10 Blue  2x10 Blue    Wall push-up  2x10  2x10  2x10  2x10  2x10    Supine stick press and flex   10x   2#  10x  3# 10x  3# 2x10   4#    S/L ER and ABD     15x Each  15 Each   And S/L Flex                            Ther Ex        Table slides-flex and scap        Table circles         Shoulder shrug        UBE  10 min   100 resist  Alt  10 min   100 Resist  Alt  10 min   100 resist  Alt  10 min   90 resist   Alt  10 min   90 resist  Alt    TB MTP and LTP  2x15   Each   Green  2x15 Each  Green 2x15 Each   Green  2x10 Each Blue  2x10 Each   Blue    Bicep curls         Pendulums-All Directions         Wrist rolls         Cane shoulder flex and ER  10x5" Hold   Each  HEP      Pulleys Flex  4 min  4 min  4 min  DC    Supine press and flex with cane         TB Punch  2x10 Green  2x10 Green  2x15 Green  2x15 Green  10x Blue    Shoulder flex and ABD to 90      2x10   Each                            HEP update/review         Ther Activity                        Gait Training                        Modalities        CP to the left shoulder and UB in seated 15 min  15 min  15 min  15 min  15 min

## 2021-07-22 ENCOUNTER — OFFICE VISIT (OUTPATIENT)
Dept: PHYSICAL THERAPY | Facility: CLINIC | Age: 66
End: 2021-07-22
Payer: COMMERCIAL

## 2021-07-22 DIAGNOSIS — M75.122 NONTRAUMATIC COMPLETE TEAR OF LEFT ROTATOR CUFF: Primary | ICD-10-CM

## 2021-07-22 DIAGNOSIS — Z98.890 S/P SHOULDER SURGERY: ICD-10-CM

## 2021-07-22 PROCEDURE — 97112 NEUROMUSCULAR REEDUCATION: CPT | Performed by: PHYSICAL THERAPIST

## 2021-07-22 PROCEDURE — 97110 THERAPEUTIC EXERCISES: CPT | Performed by: PHYSICAL THERAPIST

## 2021-07-22 PROCEDURE — 97140 MANUAL THERAPY 1/> REGIONS: CPT | Performed by: PHYSICAL THERAPIST

## 2021-07-22 NOTE — PROGRESS NOTES
Daily Note     Today's date: 2021  Patient name: Nigel Mendieta  : 1955  MRN: [id number]  Referring provider: Kathy Mei  Dx:   Encounter Diagnosis     ICD-10-CM    1  Nontraumatic complete tear of left rotator cuff  M75 122    2  S/P shoulder surgery  Z98 890                   Subjective: The patient reports that he is doing okay today, offers no new complaints at start of session  He will be going back to see the doctor tomorrow for his follow up appointment  Objective: See treatment diary below      Assessment: Good tolerance to all TE today  He is limited with PROM for all planes, mostly with ER  No increased pain noted during session  Continued PT would be beneficial to improve function  Plan: Continue per plan of care  He is to see the doctor tomorrow and to await any recommendations  He will be on vacation next week, will resume PT when he returns         Precautions:  AROM of flexion, extension and IR, no AROM over 45 degrees for ABD and ER, no active strengthening of ER and ABD, PMH skin cancer and MI with cardiac stent       Manuals 7/22 7/9 7/13 7/15 7/19   Left shoulder and scapula mobs and stretching  15 min  15 min  15 min  15 min  15 min                            Neuro Re-Ed        Bent over row  D/C 10x 8#  10x 12#  10x 8#  10x 12# 10x 8#  10x 12#  DC   Scapular pinch  HEP        Shoulder Flexion Wall Slides  IYT  2 x 10 each  15x5" Hold  15x5" Hold  IYT  10x Each  IYT  2x10 Each    TB IR  2 x 10 Blue 2x10 Green  2x10 Green  2x10 Blue  2x10 Blue    Wall push-up  2 x 10  2x10  2x10  2x10  2x10    Supine stick press and flex  2 x 10 4# 10x   2#  10x  3# 10x  3# 2x10   4#    S/L ER and ABD  15 each and S/L Flex   15x Each  15 Each   And S/L Flex                            Ther Ex        Table slides-flex and scap        Table circles         Shoulder shrug        UBE  10 min   90 resist  Alt  10 min   100 Resist  Alt  10 min   100 resist  Alt  10 min 90 resist   Alt  10 min   90 resist  Alt    TB MTP and LTP  2 x 10  Each   Blue  2x15 Each  Green 2x15 Each   Green  2x10 Each Blue  2x10 Each   Blue    Bicep curls         Pendulums-All Directions         Wrist rolls         Cane shoulder flex and ER   HEP      Pulleys Flex   4 min  4 min  DC    Supine press and flex with cane         TB Punch  Blue 2 x 10 2x10 Green  2x15 Green  2x15 Green  10x Blue    Shoulder flex and ABD to 90  2 x 10 each    2x10   Each                            HEP update/review         Ther Activity                        Gait Training                        Modalities        CP to the left shoulder and UB in seated 15 mins 15 min  15 min  15 min  15 min

## 2021-07-23 ENCOUNTER — OFFICE VISIT (OUTPATIENT)
Dept: OBGYN CLINIC | Facility: CLINIC | Age: 66
End: 2021-07-23

## 2021-07-23 VITALS
HEIGHT: 67 IN | SYSTOLIC BLOOD PRESSURE: 138 MMHG | DIASTOLIC BLOOD PRESSURE: 96 MMHG | HEART RATE: 94 BPM | WEIGHT: 200 LBS | BODY MASS INDEX: 31.39 KG/M2 | TEMPERATURE: 96.7 F

## 2021-07-23 DIAGNOSIS — M75.122 NONTRAUMATIC COMPLETE TEAR OF LEFT ROTATOR CUFF: Primary | ICD-10-CM

## 2021-07-23 PROCEDURE — 99024 POSTOP FOLLOW-UP VISIT: CPT | Performed by: ORTHOPAEDIC SURGERY

## 2021-07-23 NOTE — LETTER
July 23, 2021     Patient: Veronica Jay   YOB: 1955   Date of Visit: 7/23/2021       To Whom it May Concern:    Veronica Jay is under my professional care  He was seen in my office on 7/23/2021  He may return to work on 07/26/2021  He is permitted to use his left upper extremity below shoulder height lifting up to 5 lb  He is permitted repetitive rotational activities, such as operating a forklift  He is not to use his left upper extremity above shoulder height and is not permitted to work on elevated structures  These restrictions remain in effect for 6 weeks until he is rechecked  If you have any questions or concerns, please don't hesitate to call           Sincerely,          Tanya Wan        CC: No Recipients

## 2021-07-23 NOTE — PROGRESS NOTES
Patient Name:  Isabel Jose  MRN:  [id number]    Assessment     1  Nontraumatic complete tear of left rotator cuff  Ambulatory referral to Physical Therapy       Plan     1  I would recommend that Esequiel Garcia continue therapy but now may pursue a more aggressive strengthening program   A work note was provided allowing him limited duty  He did ask about Marrie Mariana a cane on a upcoming vacation and I would recommend he be cautious about this activity but do not see a reason why he can not do so  I will see him in about 6 weeks  He is to contact me if any questions or concerns arise  Return in about 6 weeks (around 9/3/2021)  Subjective   Isabel Jose returns for follow-up of his left rotator cuff repair  The patient is nearly 3 month(s) post op and returns for routine follow-up  Patient complains of some continued discomfort and tightness but notes improvement over the last few weeks  He continues to attend physical therapy  Objective     /96 (BP Location: Left arm, Patient Position: Sitting, Cuff Size: Standard)   Pulse 94   Temp (!) 96 7 °F (35 9 °C)   Ht 5' 7" (1 702 m)   Wt 90 7 kg (200 lb)   BMI 31 32 kg/m²     The left shoulder exam demonstrates active abduction of approximately 130° including glenohumeral and scapulothoracic motion  He has forward flexion of about 140° as well  He can reach behind his head  He can reach to the upper lumbar spine with abduction internal rotation he had good strength of resisted internal rotation of the shoulder, 4/5 strength of resisted external rotation and 4/5 strength of resisted abduction  Passively, I was able to abduct the glenohumeral joint to 90° and forward flex the glenohumeral joint to 90°  The incision is healed  He has no tenderness about the shoulder          Dain Ford

## 2021-08-03 ENCOUNTER — APPOINTMENT (OUTPATIENT)
Dept: PHYSICAL THERAPY | Facility: CLINIC | Age: 66
End: 2021-08-03
Payer: COMMERCIAL

## 2021-08-03 NOTE — PROGRESS NOTES
PT Re-Evaluation     Today's date: 2021  Patient name: Adriana Bahena  : 1955  MRN: [id number]  Referring provider: Riaz Sagastume  Dx:   Encounter Diagnosis     ICD-10-CM    1  Nontraumatic complete tear of left rotator cuff  M75 122    2  S/P shoulder surgery  Z98 890                   Assessment  Assessment details: PT notes the patient with continuation of decrease ROM and strength t/o the left shoulder and scapula with need for continuation of skilled therapy for 4 weeks with focus on scapular stabilization, posture, manual therapy, analgesic modalities, and update/review of HEP  Impairments: abnormal or restricted ROM, activity intolerance, impaired physical strength, lacks appropriate home exercise program, pain with function, scapular dyskinesis and poor posture   Understanding of Dx/Px/POC: good   Prognosis: good    Goals  ST  Initiate HEP-MET  2  Decrease pain levels by 25-50%-Partial MET   3  Increase ROM by 25-50%-Partial MET  4  Increase strength by 1-2 mm grades-Partial MET  LT  Improve postural awareness-Partial MET  2  Improve scapular stability-Partial MET  3  Decrease limitations with reaching, carrying, and lifting-NOT MET  4  Decrease limitations with OH and push/pull activities-NOT MET  5  Decrease limitations with ADL-Partial MET  6  RTE partial/full Duty-NOT MET   7   DC with HEP-Progressing     Plan  Plan details: PT notes review of POC and findings with patient who is in agreement with PT recommendations of continuation of skilled therapy      Patient would benefit from: skilled physical therapy  Planned modality interventions: cryotherapy  Planned therapy interventions: manual therapy, neuromuscular re-education, patient education, self care, strengthening, stretching, therapeutic exercise, home exercise program, graded exercise and flexibility  Frequency: 2x week  Duration in weeks: 4  Treatment plan discussed with: patient        Subjective Evaluation    History of Present Illness  Date of surgery: 2021  Mechanism of injury: surgery  Mechanism of injury: Patient reports development of left shoulder pain about 6 months ago from insidious onset with progressive worsening over time leading to limitations with left shoulder movement and increase pain levels  Patient went to ortho MD for evaluation and was treated with analgesic injection and course of OPPT with minimal to no change in status so patient was evaluated by x-ray with findings with complete RC tear with need for surgical repair  Patient underwent the procedure on 21 and is now p/o with orders for OPPT  Patient reports he is presently OOW as  at [facility] with no set RTW date  Patient reports significant PMH of MI with cardiac stent, HBP, DM, GERD, macular degeneration and skin cancer  Presently the patient has attended 15 sessions of skilled therapy and feels 70-75% improvement since the start of therapy with some decrease in pain levels but continuation of weakness with limited movement of the left shoulder and UE  Patient feels he requires more therapy to continue to get stronger and more movement in the left shoulder so he can RTW full duty and return to pre-injury status  Patient reports he is RTW on 21 with restricted status of 5# weight restriction      Pain  Current pain ratin  At best pain ratin  At worst pain ratin  Location: Left Shoulder   Quality: sharp and radiating  Relieving factors: rest and medications  Aggravating factors: lifting and overhead activity  Progression: improved      Diagnostic Tests  MRI studies: abnormal  Treatments  Previous treatment: physical therapy, medication and injection treatment  Current treatment: immobilization and medication  Patient Goals  Patient goals for therapy: decreased pain, increased motion, increased strength, independence with ADLs/IADLs and return to work          Objective     Postural Observations  Seated posture: fair  Standing posture: fair    Additional Postural Observation Details  PT notes bilateral rounded shoulders, forward head and bilateral protracted scapula     Tenderness     Left Shoulder   No tenderness     Neurological Testing     Reflexes   Left   Biceps (C5/C6): normal (2+)  Brachioradialis (C6): normal (2+)    Right   Biceps (C5/C6): normal (2+)  Brachioradialis (C6): normal (2+)    Active Range of Motion   Cervical/Thoracic Spine       Cervical    Flexion:  WFL  Extension:  WFL  Left lateral flexion:  WFL  Right lateral flexion:  WFL  Left rotation:  WFL  Right rotation:  [facility]  Left Shoulder   Flexion: 133 degrees with pain  Extension: WFL  Abduction: 117 degrees with pain  External rotation 45°: 24 degrees with pain  Internal rotation 45°: 51 degrees with pain    Left Elbow   Normal active range of motion    Passive Range of Motion   Left Shoulder   Flexion: 157 degrees with pain  Extension: WFL and with pain  Abduction: 154 degrees with pain  External rotation 45°: 37 degrees with pain  Internal rotation 45°: 59 degrees with pain    Additional Passive Range of Motion Details  PT notes continuation of decrease ROM and strength t/o the left shoulder and scapula     Scapular Mobility   Left Shoulder   Scapular Dyskinesis: grade I  Scapular mobility: good    Strength/Myotome Testing     Left Shoulder     Planes of Motion   Flexion: 3+   Extension: 4   Abduction: 3-   Adduction: 4   External rotation at 0°: 3-   Internal rotation at 0°: 4-   Internal rotation at 90°: 3+     Left Elbow   Flexion: 4  Extension: 4+             Precautions:  PMH skin cancer and MI with cardiac stent       Manuals 7/22 8/4  7/15 7/19   Left shoulder and scapula mobs and stretching  15 min  15 min   15 min  15 min                            Neuro Re-Ed        Bent over row  D/C   10x 8#  10x 12#  DC   Scapular pinch  HEP        Shoulder Flexion Wall Slides  IYT  2 x 10 each  IYT  2x10 Each   IYT  10x Each IYT  2x10 Each    TB IR  2 x 10 Blue DC  2x10 Blue  2x10 Blue    Wall push-up  2 x 10  2x15   2x10  2x10    Supine stick press and flex  2 x 10 4# NT  10x  3# 2x10   4#    S/L scapular 4 way  15 each and S/L Flex 20x Each   15x Each  15 Each   And S/L Flex    TB ER   2x10 Green Left Only                       Ther Ex        Table slides-flex and scap        Table circles         Shoulder shrug        UBE  10 min   90 resist  Alt  10 min   90 resist   Alt   10 min   90 resist   Alt  10 min   90 resist  Alt    TB MTP and LTP  2 x 10  Each   Blue  2x10 Each   Blue   2x10 Each Blue  2x10 Each   Blue    Bicep curls         Pendulums-All Directions         Wrist rolls         Cane shoulder flex and ER         Pulleys Flex     DC    Supine press and flex with cane         TB Punch  Blue 2 x 10 45 degree up  2x10 Bilat   2x15 Green  10x Blue    Shoulder flex and ABD to 90  2 x 10 each 2x10 Each   Full range    2x10   Each     press   10x Stick Only                       HEP update/review         Ther Activity                        Gait Training                        Modalities        CP to the left shoulder and UB in seated 15 min 15 min   15 min  15 min

## 2021-08-03 NOTE — PROGRESS NOTES
PT Re-Evaluation     Today's date: 8/3/2021  Patient name: Erendira Choudhary  : 1955  MRN: [id number]  Referring provider: Hamilton Rae  Dx:   Encounter Diagnosis     ICD-10-CM    1  Nontraumatic complete tear of left rotator cuff  M75 122    2  S/P shoulder surgery  Z98 890                   Assessment  Assessment details: PT notes the patient with continuation of decrease ROM and strength t/o the left shoulder and scapula with need for continuation of skilled therapy for 8 weeks with focus on scapular stabilization, posture, manual therapy, analgesic modalities, and update/review of HEP  Impairments: abnormal or restricted ROM, activity intolerance, impaired physical strength, lacks appropriate home exercise program, pain with function, scapular dyskinesis and poor posture   Understanding of Dx/Px/POC: good   Prognosis: good    Goals  ST  Initiate HEP-MET  2  Decrease pain levels by 25-50%-Partial MET   3  Increase ROM by 25-50%-Partial MET  4  Increase strength by 1-2 mm grades-Partial MET  LT  Improve postural awareness-Partial MET  2  Improve scapular stability-Partial MET  3  Decrease limitations with reaching, carrying, and lifting-NOT MET  4  Decrease limitations with OH and push/pull activities-NOT MET  5  Decrease limitations with ADL-Partial MET  6  RTE partial/full Duty-NOT MET   7   DC with HEP-Progressing     Plan  Plan details: PT notes review of POC and findings with patient who is in agreement with PT recommendations of continuation of skilled therapy      Patient would benefit from: skilled physical therapy  Planned modality interventions: cryotherapy  Planned therapy interventions: manual therapy, neuromuscular re-education, patient education, self care, strengthening, stretching, therapeutic exercise, home exercise program, graded exercise and flexibility  Frequency: 2x week  Duration in weeks: 8  Treatment plan discussed with: patient        Subjective Evaluation    History of Present Illness  Date of surgery: 2021  Mechanism of injury: surgery  Mechanism of injury: Patient reports development of left shoulder pain about 6 months ago from insidious onset with progressive worsening over time leading to limitations with left shoulder movement and increase pain levels  Patient went to ortho MD for evaluation and was treated with analgesic injection and course of OPPT with minimal to no change in status so patient was evaluated by x-ray with findings with complete RC tear with need for surgical repair  Patient underwent the procedure on 21 and is now p/o with orders for OPPT  Patient reports he is presently OOW as  at [facility] with no set RTW date  Patient reports significant PMH of MI with cardiac stent, HBP, DM, GERD, macular degeneration and skin cancer  Presently the patient has attended 14 sessions of skilled therapy and feels 50% improvement since the start of therapy with some decrease in pain levels but continuation of weakness with limited movement of the left shoulder and UE    Patient feels he requires more therapy to continue to get stronger and more movement in the left shoulder so he can RTW full duty and return to pre-injury status  Pain  Current pain rating: 3  At best pain ratin  At worst pain ratin  Location: Left Shoulder   Quality: sharp and radiating  Relieving factors: rest and medications  Progression: improved      Diagnostic Tests  MRI studies: abnormal  Treatments  Previous treatment: physical therapy, medication and injection treatment  Current treatment: immobilization and medication  Patient Goals  Patient goals for therapy: decreased pain, increased motion, increased strength, independence with ADLs/IADLs and return to work          Objective     Postural Observations  Seated posture: fair  Standing posture: fair    Additional Postural Observation Details  PT notes bilateral rounded shoulders, forward head and bilateral protracted scapula     Tenderness     Left Shoulder   No tenderness     Neurological Testing     Reflexes   Left   Biceps (C5/C6): normal (2+)  Brachioradialis (C6): normal (2+)    Right   Biceps (C5/C6): normal (2+)  Brachioradialis (C6): normal (2+)    Active Range of Motion   Cervical/Thoracic Spine       Cervical    Flexion:  WFL  Extension:  WFL  Left lateral flexion:  WFL  Right lateral flexion:  WFL  Left rotation:  WFL  Right rotation:  WFL    Left Elbow   Normal active range of motion    Additional Active Range of Motion Details  NT secondary to recent surgery     Passive Range of Motion   Left Shoulder   Flexion: 137 degrees with pain  Extension: WFL and with pain  Abduction: 124 degrees with pain  External rotation 45°: 31 degrees with pain  Internal rotation 45°: 57 degrees with pain    Additional Passive Range of Motion Details  PT notes continuation of decrease ROM and strength t/o the left shoulder and scapula     Scapular Mobility   Left Shoulder   Scapular Dyskinesis: grade II  Scapular mobility: good    Strength/Myotome Testing     Left Elbow   Flexion: 4-  Extension: 4    Additional Strength Details  NT secondary to recent surgery              Precautions:  PMH skin cancer and MI with cardiac stent       Manuals 7/22 8/3  7/15 7/19   Left shoulder and scapula mobs and stretching  15 min    15 min  15 min                            Neuro Re-Ed        Bent over row  D/C   10x 8#  10x 12#  DC   Scapular pinch  HEP        Shoulder Flexion Wall Slides  IYT  2 x 10 each    IYT  10x Each  IYT  2x10 Each    TB IR  2 x 10 Blue   2x10 Blue  2x10 Blue    Wall push-up  2 x 10    2x10  2x10    Supine stick press and flex  2 x 10 4#   10x  3# 2x10   4#    S/L ER and ABD  15 each and S/L Flex   15x Each  15 Each   And S/L Flex                            Ther Ex        Table slides-flex and scap        Table circles         Shoulder shrug        UBE  10 min   90 resist  Alt    10 min 90 resist   Alt  10 min   90 resist  Alt    TB MTP and LTP  2 x 10  Each   Blue    2x10 Each Blue  2x10 Each   Blue    Bicep curls         Pendulums-All Directions         Wrist rolls         Cane shoulder flex and ER         Pulleys Flex     DC    Supine press and flex with cane         TB Punch  Blue 2 x 10   2x15 Green  10x Blue    Shoulder flex and ABD to 90  2 x 10 each    2x10   Each                            HEP update/review         Ther Activity                        Gait Training                        Modalities        CP to the left shoulder and UB in seated 15 mins   15 min  15 min

## 2021-08-03 NOTE — PROGRESS NOTES
PT Re-Evaluation     Today's date: 8/3/2021  Patient name: Jenny Whitley  : 1955  MRN: [id number]  Referring provider: Florentino Adams  Dx:   Encounter Diagnosis     ICD-10-CM    1  Nontraumatic complete tear of left rotator cuff  M75 122    2  S/P shoulder surgery  [id number] [id number]                   Assessment  Assessment details: PT notes the patient with continuation of decrease ROM and strength t/o the left shoulder and scapula with need for continuation of skilled therapy for 8 weeks with focus on scapular stabilization, posture, manual therapy, analgesic modalities, and update/review of HEP  PT will comply with MD orders of no active movement of left shoulder ABD and ER with no ROM over 45 degrees  Impairments: abnormal or restricted ROM, activity intolerance, impaired physical strength, lacks appropriate home exercise program, pain with function, scapular dyskinesis and poor posture   Understanding of Dx/Px/POC: good   Prognosis: good    Goals  ST  Initiate HEP-MET  2  Decrease pain levels by 25-50%-Partial MET   3  Increase ROM by 25-50%-Partial MET  4  Increase strength by 1-2 mm grades-Partial MET  LT  Improve postural awareness-Partial MET  2  Improve scapular stability-Partial MET  3  Decrease limitations with reaching, carrying, and lifting-NOT MET  4  Decrease limitations with OH and push/pull activities-NOT MET  5  Decrease limitations with ADL-Partial MET  6  RTE partial/full Duty-NOT MET   7   DC with HEP-Progressing     Plan  Plan details: PT notes review of POC and findings with patient who is in agreement with PT recommendations of continuation of skilled therapy      Patient would benefit from: skilled physical therapy  Planned modality interventions: cryotherapy  Planned therapy interventions: manual therapy, neuromuscular re-education, patient education, self care, strengthening, stretching, therapeutic exercise, home exercise program, graded exercise and flexibility  Frequency: 2x week  Duration in weeks: 8  Treatment plan discussed with: patient        Subjective Evaluation    History of Present Illness  Date of surgery: 2021  Mechanism of injury: surgery  Mechanism of injury: Patient reports development of left shoulder pain about 6 months ago from insidious onset with progressive worsening over time leading to limitations with left shoulder movement and increase pain levels  Patient went to ortho MD for evaluation and was treated with analgesic injection and course of OPPT with minimal to no change in status so patient was evaluated by x-ray with findings with complete RC tear with need for surgical repair  Patient underwent the procedure on 21 and is now p/o with orders for OPPT  Patient reports he is presently OOW as  at [facility] with no set RTW date  Patient reports significant PMH of MI with cardiac stent, HBP, DM, GERD, macular degeneration and skin cancer  Presently the patient has attended 14 sessions of skilled therapy and feels 50% improvement since the start of therapy with some decrease in pain levels but continuation of weakness with limited movement of the left shoulder and UE    Patient feels he requires more therapy to continue to get stronger and more movement in the left shoulder so he can RTW full duty and return to pre-injury status  Pain  Current pain rating: 3  At best pain ratin  At worst pain ratin  Location: Left Shoulder   Quality: sharp and radiating  Relieving factors: rest and medications  Progression: improved      Diagnostic Tests  MRI studies: abnormal  Treatments  Previous treatment: physical therapy, medication and injection treatment  Current treatment: immobilization and medication  Patient Goals  Patient goals for therapy: decreased pain, increased motion, increased strength, independence with ADLs/IADLs and return to work          Objective     Postural Observations  Seated posture: fair  Standing posture: fair    Additional Postural Observation Details  PT notes bilateral rounded shoulders, forward head and bilateral protracted scapula     Tenderness     Left Shoulder   No tenderness     Neurological Testing     Reflexes   Left   Biceps (C5/C6): normal (2+)  Brachioradialis (C6): normal (2+)    Right   Biceps (C5/C6): normal (2+)  Brachioradialis (C6): normal (2+)    Active Range of Motion   Cervical/Thoracic Spine       Cervical    Flexion:  WFL  Extension:  WFL  Left lateral flexion:  WFL  Right lateral flexion:  WFL  Left rotation:  WFL  Right rotation:  WFL    Left Elbow   Normal active range of motion    Additional Active Range of Motion Details  NT secondary to recent surgery     Passive Range of Motion   Left Shoulder   Flexion: 137 degrees with pain  Extension: WFL and with pain  Abduction: 124 degrees with pain  External rotation 45°: 31 degrees with pain  Internal rotation 45°: 57 degrees with pain    Additional Passive Range of Motion Details  PT notes continuation of decrease ROM and strength t/o the left shoulder and scapula     Scapular Mobility   Left Shoulder   Scapular Dyskinesis: grade II  Scapular mobility: good    Strength/Myotome Testing     Left Elbow   Flexion: 4-  Extension: 4    Additional Strength Details  NT secondary to recent surgery              Precautions:  AROM of flexion, extension and IR, no AROM over 45 degrees for ABD and ER, no active strengthening of ER and ABD, PMH skin cancer and MI with cardiac stent       Manuals 7/22 8/3  7/15 7/19   Left shoulder and scapula mobs and stretching  15 min    15 min  15 min                            Neuro Re-Ed        Bent over row  D/C   10x 8#  10x 12#  DC   Scapular pinch  HEP        Shoulder Flexion Wall Slides  IYT  2 x 10 each    IYT  10x Each  IYT  2x10 Each    TB IR  2 x 10 Blue   2x10 Blue  2x10 Blue    Wall push-up  2 x 10    2x10  2x10    Supine stick press and flex  2 x 10 4#   10x  3# 2x10   4#    S/L ER and ABD  15 each and S/L Flex   15x Each  15 Each   And S/L Flex                            Ther Ex        Table slides-flex and scap        Table circles         Shoulder shrug        UBE  10 min   90 resist  Alt    10 min   90 resist   Alt  10 min   90 resist  Alt    TB MTP and LTP  2 x 10  Each   Blue    2x10 Each Blue  2x10 Each   Blue    Bicep curls         Pendulums-All Directions         Wrist rolls         Cane shoulder flex and ER         Pulleys Flex     DC    Supine press and flex with cane         TB Punch  Blue 2 x 10   2x15 Green  10x Blue    Shoulder flex and ABD to 90  2 x 10 each    2x10   Each                            HEP update/review         Ther Activity                        Gait Training                        Modalities        CP to the left shoulder and UB in seated 15 mins   15 min  15 min

## 2021-08-04 ENCOUNTER — EVALUATION (OUTPATIENT)
Dept: PHYSICAL THERAPY | Facility: CLINIC | Age: 66
End: 2021-08-04
Payer: COMMERCIAL

## 2021-08-04 ENCOUNTER — APPOINTMENT (OUTPATIENT)
Dept: PHYSICAL THERAPY | Facility: CLINIC | Age: 66
End: 2021-08-04
Payer: COMMERCIAL

## 2021-08-04 DIAGNOSIS — M75.122 NONTRAUMATIC COMPLETE TEAR OF LEFT ROTATOR CUFF: Primary | ICD-10-CM

## 2021-08-04 DIAGNOSIS — Z98.890 S/P SHOULDER SURGERY: ICD-10-CM

## 2021-08-04 PROCEDURE — 97110 THERAPEUTIC EXERCISES: CPT | Performed by: PHYSICAL THERAPIST

## 2021-08-04 PROCEDURE — 97112 NEUROMUSCULAR REEDUCATION: CPT | Performed by: PHYSICAL THERAPIST

## 2021-08-04 PROCEDURE — 97140 MANUAL THERAPY 1/> REGIONS: CPT | Performed by: PHYSICAL THERAPIST

## 2021-08-06 ENCOUNTER — OFFICE VISIT (OUTPATIENT)
Dept: PHYSICAL THERAPY | Facility: CLINIC | Age: 66
End: 2021-08-06
Payer: COMMERCIAL

## 2021-08-06 DIAGNOSIS — M75.122 NONTRAUMATIC COMPLETE TEAR OF LEFT ROTATOR CUFF: Primary | ICD-10-CM

## 2021-08-06 DIAGNOSIS — Z98.890 S/P SHOULDER SURGERY: ICD-10-CM

## 2021-08-06 PROCEDURE — 97112 NEUROMUSCULAR REEDUCATION: CPT | Performed by: PHYSICAL THERAPIST

## 2021-08-06 PROCEDURE — 97140 MANUAL THERAPY 1/> REGIONS: CPT | Performed by: PHYSICAL THERAPIST

## 2021-08-06 PROCEDURE — 97110 THERAPEUTIC EXERCISES: CPT | Performed by: PHYSICAL THERAPIST

## 2021-08-06 NOTE — PROGRESS NOTES
Daily Note     Today's date: 2021  Patient name: Isabel Jose  : 1955  MRN: [id number]  Referring provider: Susanne Alfaro  Dx:   Encounter Diagnosis     ICD-10-CM    1  Nontraumatic complete tear of left rotator cuff  M75 122    2  S/P shoulder surgery  [id number] [id number]                   Subjective:  Patient reports minimal soreness in the left shoulder to 1/10 level  Patient reports the arm is moving better overall  Objective: See treatment diary below      Assessment: Tolerated treatment well  PT notes continuation of progression of POC with focus on scapular stabilization and manual therapy to decrease pain levels and improve functional limitations to meet therapy goals  PT notes continuation of decrease ROM and strength t/o the left shoulder and scapula with need for continuation of skilled therapy  Plan: Continue per plan of care        Precautions:  PMH skin cancer and MI with cardiac stent       Manuals      Left shoulder and scapula mobs and stretching  15 min  15 min  15 min                              Neuro Re-Ed        Bent over row  D/C       Scapular pinch  HEP        Shoulder Flexion Wall Slides  IYT  2 x 10 each  IYT  2x10 Each  IYT  2x10 Each      TB IR  2 x 10 Blue DC      Wall push-up  2 x 10  2x15  2x15      Supine stick press and flex  2 x 10 4# NT      S/L scapular 4 way  15 each and S/L Flex 20x Each  20x Each      TB ER   2x10 Green Left Only  2x10 Green   Left Only                      Ther Ex        Table slides-flex and scap        Table circles         Shoulder shrug        UBE  10 min   90 resist  Alt  10 min   90 resist   Alt  10 min   90 resist  Alt      TB MTP and LTP  2 x 10  Each   Blue  2x10 Each   Blue  2x10   Each   Blue      Bicep curls         Pendulums-All Directions         Wrist rolls         Cane shoulder flex and ER         Pulleys Flex         Supine press and flex with cane         TB Punch  Blue 2 x 10 45 degree up  2x10 Bilat Green  45 degree up 2x10 Bilat Green      Shoulder flex and ABD to 90  2 x 10 each 2x10 Each   Full range  15x Each   Full Range   1,5#       press   10x Stick Only  2x10 Stick Only                      HEP update/review         Ther Activity                        Gait Training                        Modalities        CP to the left shoulder and UB in seated 15 min 15 min  15 min

## 2021-08-11 ENCOUNTER — OFFICE VISIT (OUTPATIENT)
Dept: PHYSICAL THERAPY | Facility: CLINIC | Age: 66
End: 2021-08-11
Payer: COMMERCIAL

## 2021-08-11 DIAGNOSIS — Z98.890 S/P SHOULDER SURGERY: ICD-10-CM

## 2021-08-11 DIAGNOSIS — M75.122 NONTRAUMATIC COMPLETE TEAR OF LEFT ROTATOR CUFF: Primary | ICD-10-CM

## 2021-08-11 PROCEDURE — 97110 THERAPEUTIC EXERCISES: CPT | Performed by: PHYSICAL THERAPIST

## 2021-08-11 PROCEDURE — 97140 MANUAL THERAPY 1/> REGIONS: CPT | Performed by: PHYSICAL THERAPIST

## 2021-08-11 PROCEDURE — 97112 NEUROMUSCULAR REEDUCATION: CPT | Performed by: PHYSICAL THERAPIST

## 2021-08-11 NOTE — PROGRESS NOTES
Daily Note     Today's date: 2021  Patient name: Nigel Mendieta  : 1955  MRN: [id number]  Referring provider: Kathy Mei  Dx:   Encounter Diagnosis     ICD-10-CM    1  Nontraumatic complete tear of left rotator cuff  M75 122    2  S/P shoulder surgery  [id number] [id number]                   Subjective:  Patient reports he has RTW light duty and feels fatigue but no increase in left shoulder pain  Patient reports 2/10 soreness in the left shoulder  Objective: See treatment diary below      Assessment: Tolerated treatment well  PT notes continuation of progression of POC with focus on scapular stabilization and manual therapy to decrease pain levels and improve functional limitations to meet therapy goals  PT notes continuation of decrease ROM and strength t/o the left shoulder and scapula with need for continuation of skilled therapy  Plan: Continue per plan of care        Precautions:  PMH skin cancer and MI with cardiac stent       Manuals     Left shoulder and scapula mobs and stretching  15 min  15 min  15 min  15 min                             Neuro Re-Ed        Bent over row  D/C       Scapular pinch  HEP        Shoulder Flexion Wall Slides  IYT  2 x 10 each  IYT  2x10 Each  IYT  2x10 Each  2x10   IYT  Each     TB IR  2 x 10 Blue DC      Wall push-up  2 x 10  2x15  2x15  2x15    Supine stick press and flex  2 x 10 4# NT      S/L scapular 4 way  15 each and S/L Flex 20x Each  20x Each  15x Each   1#     TB ER   2x10 Green Left Only  2x10 Green   Left Only  15x Each   TB Bilat ER and horizontal ABD                     Ther Ex        Table slides-flex and scap        Table circles         Shoulder shrug        UBE  10 min   90 resist  Alt  10 min   90 resist   Alt  10 min   90 resist  Alt  10 min   80 resist Alt     TB MTP and LTP  2 x 10  Each   Blue  2x10 Each   Blue  2x10   Each   Blue  2x15 Each   Blue     Bicep curls         Pendulums-All Directions         Wrist rolls         Cane shoulder flex and ER         Pulleys Flex         Supine press and flex with cane         TB Punch  Blue 2 x 10 45 degree up  2x10 Bilat Green  45 degree up 2x10 Bilat Green  45 degree up 2x10 Bilat  Blue     Shoulder flex and ABD to 90  2 x 10 each 2x10 Each   Full range  15x Each   Full Range   1 5#  15x each Full Range   1 5#      press   10x Stick Only  2x10 Stick Only  15x 2# stick                     HEP update/review         Ther Activity                        Gait Training                        Modalities        CP to the left shoulder and UB in seated 15 min 15 min  15 min 15 min

## 2021-08-12 ENCOUNTER — OFFICE VISIT (OUTPATIENT)
Dept: PHYSICAL THERAPY | Facility: CLINIC | Age: 66
End: 2021-08-12
Payer: COMMERCIAL

## 2021-08-12 ENCOUNTER — OFFICE VISIT (OUTPATIENT)
Dept: FAMILY MEDICINE CLINIC | Facility: CLINIC | Age: 66
End: 2021-08-12
Payer: COMMERCIAL

## 2021-08-12 VITALS
DIASTOLIC BLOOD PRESSURE: 92 MMHG | OXYGEN SATURATION: 97 % | BODY MASS INDEX: 32.08 KG/M2 | WEIGHT: 204.4 LBS | HEART RATE: 89 BPM | SYSTOLIC BLOOD PRESSURE: 150 MMHG | HEIGHT: 67 IN

## 2021-08-12 DIAGNOSIS — Z00.00 ANNUAL PHYSICAL EXAM: ICD-10-CM

## 2021-08-12 DIAGNOSIS — I10 ESSENTIAL HYPERTENSION: ICD-10-CM

## 2021-08-12 DIAGNOSIS — Z98.890 S/P SHOULDER SURGERY: ICD-10-CM

## 2021-08-12 DIAGNOSIS — K21.9 GASTROESOPHAGEAL REFLUX DISEASE WITHOUT ESOPHAGITIS: ICD-10-CM

## 2021-08-12 DIAGNOSIS — E78.00 HIGH CHOLESTEROL: ICD-10-CM

## 2021-08-12 DIAGNOSIS — F41.9 ANXIETY: ICD-10-CM

## 2021-08-12 DIAGNOSIS — E11.9 TYPE 2 DIABETES MELLITUS WITHOUT COMPLICATION, WITHOUT LONG-TERM CURRENT USE OF INSULIN (HCC): Primary | ICD-10-CM

## 2021-08-12 DIAGNOSIS — M75.82 TENDINITIS OF LEFT ROTATOR CUFF: ICD-10-CM

## 2021-08-12 DIAGNOSIS — I10 WHITE COAT SYNDROME WITH DIAGNOSIS OF HYPERTENSION: ICD-10-CM

## 2021-08-12 DIAGNOSIS — M75.122 NONTRAUMATIC COMPLETE TEAR OF LEFT ROTATOR CUFF: Primary | ICD-10-CM

## 2021-08-12 LAB — SL AMB POCT HEMOGLOBIN AIC: 6.9 (ref ?–6.5)

## 2021-08-12 PROCEDURE — 1036F TOBACCO NON-USER: CPT | Performed by: NURSE PRACTITIONER

## 2021-08-12 PROCEDURE — 97110 THERAPEUTIC EXERCISES: CPT | Performed by: PHYSICAL THERAPIST

## 2021-08-12 PROCEDURE — 4010F ACE/ARB THERAPY RXD/TAKEN: CPT | Performed by: ORTHOPAEDIC SURGERY

## 2021-08-12 PROCEDURE — 3044F HG A1C LEVEL LT 7.0%: CPT | Performed by: NURSE PRACTITIONER

## 2021-08-12 PROCEDURE — 4010F ACE/ARB THERAPY RXD/TAKEN: CPT | Performed by: NURSE PRACTITIONER

## 2021-08-12 PROCEDURE — 97140 MANUAL THERAPY 1/> REGIONS: CPT | Performed by: PHYSICAL THERAPIST

## 2021-08-12 PROCEDURE — 83036 HEMOGLOBIN GLYCOSYLATED A1C: CPT | Performed by: NURSE PRACTITIONER

## 2021-08-12 PROCEDURE — 3044F HG A1C LEVEL LT 7.0%: CPT | Performed by: ORTHOPAEDIC SURGERY

## 2021-08-12 PROCEDURE — 97112 NEUROMUSCULAR REEDUCATION: CPT | Performed by: PHYSICAL THERAPIST

## 2021-08-12 PROCEDURE — 99396 PREV VISIT EST AGE 40-64: CPT | Performed by: NURSE PRACTITIONER

## 2021-08-12 RX ORDER — ESOMEPRAZOLE MAGNESIUM 5 MG/1
5 GRANULE, DELAYED RELEASE ORAL DAILY
Qty: 90 EACH | Refills: 1 | Status: SHIPPED | OUTPATIENT
Start: 2021-08-12

## 2021-08-12 RX ORDER — DAPAGLIFLOZIN 10 MG/1
10 TABLET, FILM COATED ORAL DAILY
Qty: 90 TABLET | Refills: 1 | Status: SHIPPED | OUTPATIENT
Start: 2021-08-12 | End: 2022-02-16 | Stop reason: SDUPTHER

## 2021-08-12 RX ORDER — CARVEDILOL 25 MG/1
25 TABLET ORAL 2 TIMES DAILY WITH MEALS
Qty: 90 TABLET | Refills: 1 | Status: SHIPPED | OUTPATIENT
Start: 2021-08-12 | End: 2021-11-26 | Stop reason: SDUPTHER

## 2021-08-12 RX ORDER — ATORVASTATIN CALCIUM 80 MG/1
80 TABLET, FILM COATED ORAL DAILY
Qty: 90 TABLET | Refills: 1 | Status: SHIPPED | OUTPATIENT
Start: 2021-08-12 | End: 2022-02-16 | Stop reason: SDUPTHER

## 2021-08-12 RX ORDER — RAMIPRIL 10 MG/1
10 CAPSULE ORAL DAILY
Qty: 90 CAPSULE | Refills: 1 | Status: SHIPPED | OUTPATIENT
Start: 2021-08-12 | End: 2022-07-21 | Stop reason: SDUPTHER

## 2021-08-12 RX ORDER — HYDROCHLOROTHIAZIDE 25 MG/1
25 TABLET ORAL DAILY
Qty: 90 TABLET | Refills: 1 | Status: SHIPPED | OUTPATIENT
Start: 2021-08-12 | End: 2022-03-24 | Stop reason: SDUPTHER

## 2021-08-12 RX ORDER — CITALOPRAM 10 MG/1
10 TABLET ORAL DAILY
Qty: 90 TABLET | Refills: 1 | Status: SHIPPED | OUTPATIENT
Start: 2021-08-12 | End: 2022-02-16 | Stop reason: SDUPTHER

## 2021-08-12 NOTE — PROGRESS NOTES
ADULT ANNUAL [facility] PRIMARY CARE    NAME: Erendira Choudhary  AGE: 61 y o  SEX: male  : 1955     DATE: 2021     Assessment and Plan:     Problem List Items Addressed This Visit        Digestive    Gastroesophageal reflux disease without esophagitis    Relevant Medications    esomeprazole (NexIUM) 5 MG packet       Endocrine    Diabetes mellitus ([address] ) - Primary    Relevant Medications    Farxiga 10 MG TABS    Other Relevant Orders    POCT hemoglobin A1c (Completed)       Cardiovascular and Mediastinum    Hypertension    Relevant Medications    hydrochlorothiazide (HYDRODIURIL) 25 mg tablet    carvedilol (COREG) 25 mg tablet    ramipril (ALTACE) 10 MG capsule    White coat syndrome with diagnosis of hypertension    Relevant Medications    hydrochlorothiazide (HYDRODIURIL) 25 mg tablet    carvedilol (COREG) 25 mg tablet    ramipril (ALTACE) 10 MG capsule       Musculoskeletal and Integument    Tendinitis of left rotator cuff       Other    High cholesterol    Relevant Medications    atorvastatin (LIPITOR) 80 mg tablet    Anxiety    Relevant Medications    citalopram (CeleXA) 10 mg tablet      Other Visit Diagnoses     Annual physical exam              Immunizations and preventive care screenings were discussed with patient today  Appropriate education was printed on patient's after visit summary  Counseling:  · Exercise: the importance of regular exercise/physical activity was discussed  Recommend exercise 3-5 times per week for at least 30 minutes  BP at home in normal parameters, states he is on Celexa due to anxiety with these appointments from his last PCP  Return in 3 months (on 2021)       Chief Complaint:     Chief Complaint   Patient presents with    Annual Exam    Medication Refill      History of Present Illness:     Adult Annual Physical   Patient here for a comprehensive physical exam  The patient reports no issues - recent left shoulder surgery - tolerating well  Reports he recently got back from vacation -- recenlty went back to work since his surgery       Diet and Physical Activity  · Diet/Nutrition: well balanced diet  · Exercise: no formal exercise  Depression Screening  PHQ-9 Depression Screening    PHQ-9:   Frequency of the following problems over the past two weeks:           General Health  · Sleep: sleeps well  · Hearing: normal - bilateral   · Vision: no vision problems  · Dental: regular dental visits   Health  · Symptoms include: none     Review of Systems:     Review of Systems   Constitutional: Negative  Respiratory: Negative  Cardiovascular: Negative  Neurological: Negative  All other systems reviewed and are negative  Past Medical History:     Past Medical History:   Diagnosis Date    Allergic Seasonal    Anesthesia     Pt reports that he became agitated after IV sedation for a colonoscopy    Coronary artery disease 08/2014    COVID-19 01/2021    Pt reports fever, fatigue, nausea and weakness  Pt denies breathing issues  Symptoms lasted 2 weeks   Diabetes mellitus ([address] )     GERD (gastroesophageal reflux disease)     Hard to intubate     Pt received a letter stating that it was hard to visualize vocal chords    Heart attack ([facility] [address] )     High cholesterol     Hypertension     Myocardial infarction [facility]) 08/2014    Stent    Wears glasses       Past Surgical History:     Past Surgical History:   Procedure Laterality Date    CARDIAC CATHETERIZATION  08/19/2014    Pt reports having one stent placed   CHOLECYSTECTOMY      COLONOSCOPY      NM SURGICAL ARTHROSCOPY SHOULDER XTNSV DBRDMT 3+ Left 5/6/2021    Procedure: ARTHROSCOPY SHOULDER , debridement; open rotator cuff repair;  Surgeon: Yesenia Landon;   Location:  MAIN OR;  Service: Orthopedics    VASECTOMY      VASECTOMY REVERSAL        Family History:     Family History   Problem Relation Age of Onset    Dementia Mother     No Known Problems Father       Social History:     Social History     Socioeconomic History    Marital status: /Civil Union     Spouse name: None    Number of children: None    Years of education: None    Highest education level: None   Occupational History    None   Tobacco Use    Smoking status: Former Smoker     Packs/day: 0 00     Years: 30 00     Pack years: 0 00     Types: Cigarettes     Quit date: 2015     Years since quittin 4    Smokeless tobacco: Never Used   Vaping Use    Vaping Use: Never used   Substance and Sexual Activity    Alcohol use: Yes     Alcohol/week: 5 0 standard drinks     Types: 5 Cans of beer per week     Comment: occasionally    Drug use: Never    Sexual activity: Yes     Partners: Female     Birth control/protection: Female Sterilization   Other Topics Concern    None   Social History Narrative    None     Social Determinants of Health     Financial Resource Strain:     Difficulty of Paying Living Expenses:    Food Insecurity:     Worried About Running Out of Food in the Last Year:     Ran Out of Food in the Last Year:    Transportation Needs:     Lack of Transportation (Medical):      Lack of Transportation (Non-Medical):    Physical Activity:     Days of Exercise per Week:     Minutes of Exercise per Session:    Stress:     Feeling of Stress :    Social Connections:     Frequency of Communication with Friends and Family:     Frequency of Social Gatherings with Friends and Family:     Attends Christianity Services:     Active Member of Clubs or Organizations:     Attends Club or Organization Meetings:     Marital Status:    Intimate Partner Violence:     Fear of Current or Ex-Partner:     Emotionally Abused:     Physically Abused:     Sexually Abused:       Current Medications:     Current Outpatient Medications   Medication Sig Dispense Refill    aspirin (Aspirin Adult Low Strength) 81 mg EC tablet Take 81 mg by mouth daily Pt stopped on May 2 for surgery on 5/6      atorvastatin (LIPITOR) 80 mg tablet Take 1 tablet (80 mg total) by mouth daily 90 tablet 1    carvedilol (COREG) 25 mg tablet Take 1 tablet (25 mg total) by mouth 2 (two) times a day with meals 90 tablet 1    citalopram (CeleXA) 10 mg tablet Take 1 tablet (10 mg total) by mouth daily 90 tablet 1    Coenzyme Q10 (CoQ-10) 100 MG CAPS Take 100 mg by mouth daily       esomeprazole (NexIUM) 5 MG packet Take 5 mg by mouth daily 90 each 1    Farxiga 10 MG TABS Take 1 tablet (10 mg total) by mouth daily 90 tablet 1    hydrochlorothiazide (HYDRODIURIL) 25 mg tablet Take 1 tablet (25 mg total) by mouth daily 90 tablet 1    Multiple Vitamins-Minerals (PreserVision AREDS 2) CAPS Take 1 capsule by mouth daily       Ozempic, 1 MG/DOSE, 2 MG/1 5ML SOPN 1 mg once a week       ramipril (ALTACE) 10 MG capsule Take 1 capsule (10 mg total) by mouth daily 90 capsule 1     No current facility-administered medications for this visit  Allergies: Allergies   Allergen Reactions    Food Allergy [Soybean Oil - Food Allergy] Anaphylaxis     Organic soy milk  Pt reports having allergy shots at that time  Physical Exam:     /92 (BP Location: Left arm, Patient Position: Sitting, Cuff Size: Standard)   Pulse 89   Ht 5' 7" (1 702 m)   Wt 92 7 kg (204 lb 6 4 oz)   SpO2 97%   BMI 32 01 kg/m²     Physical Exam  Constitutional:       Appearance: Normal appearance  HENT:      Head: Normocephalic and atraumatic  Cardiovascular:      Rate and Rhythm: Normal rate and regular rhythm  Heart sounds: No murmur heard  No gallop  Pulmonary:      Effort: Pulmonary effort is normal  No respiratory distress  Breath sounds: Normal breath sounds  No wheezing or rales  Neurological:      General: No focal deficit present  Mental Status: He is alert and oriented to person, place, and time  Mental status is at baseline     Psychiatric:         Mood and Affect: Mood normal          Behavior: Behavior normal          Thought Content:  Thought content normal          Judgment: Judgment normal           ISIDRO Hendricks  [facility]

## 2021-08-12 NOTE — PATIENT INSTRUCTIONS
You may receive a survey in the mail, or by e-mail, please fill it out COMPLETELY, and let us know how we did! Please remember to sign up for your OneWire luisana to check you lab results, send us messages, and schedule appointments  If you have questions about the OneWire option, please call us! Thank you again for choosing [facility]! Wellness Visit for Adults   AMBULATORY CARE:   A wellness visit  is when you see your healthcare provider to get screened for health problems  Your healthcare provider will also give you advice on how to stay healthy  Write down your questions so you remember to ask them  Ask your healthcare provider how often you should have a wellness visit  What happens at a wellness visit:  Your healthcare provider will ask about your health, and your family history of health problems  This includes high blood pressure, heart disease, and cancer  He or she will ask if you have symptoms that concern you, if you smoke, and about your mood  You may also be asked about your intake of medicines, supplements, food, and alcohol  Any of the following may be done:  · Your weight  will be checked  Your height may also be checked so your body mass index (BMI) can be calculated  Your BMI shows if you are at a healthy weight  · Your blood pressure  and heart rate will be checked  Your temperature may also be checked  · Blood and urine tests  may be done  Blood tests may be done to check your cholesterol levels  Abnormal cholesterol levels increase your risk for heart disease and stroke  You may also need a blood or urine test to check for diabetes if you are at increased risk  Urine tests may be done to look for signs of an infection or kidney disease  · A physical exam  includes checking your heartbeat and lungs with a stethoscope  Your healthcare provider may also check your skin to look for sun damage  · Screening tests  may be recommended   A screening test is done to check for diseases that may not cause symptoms  The screening tests you may need depend on your age, gender, family history, and lifestyle habits  For example, colorectal screening may be recommended if you are 48years old or older  Screening tests you need if you are a woman:   · A Pap smear  is used to screen for cervical cancer  Pap smears are usually done every 3 to 5 years depending on your age  You may need them more often if you have had abnormal Pap smear test results in the past  Ask your healthcare provider how often you should have a Pap smear  · A mammogram  is an x-ray of your breasts to screen for breast cancer  Experts recommend mammograms every 2 years starting at age 48 years  You may need a mammogram at age 52 years or younger if you have an increased risk for breast cancer  Talk to your healthcare provider about when you should start having mammograms and how often you need them  Vaccines you may need:   · Get an influenza vaccine  every year  The influenza vaccine protects you from the flu  Several types of viruses cause the flu  The viruses change over time, so new vaccines are made each year  · Get a tetanus-diphtheria (Td) booster vaccine  every 10 years  This vaccine protects you against tetanus and diphtheria  Tetanus is a severe infection that may cause painful muscle spasms and lockjaw  Diphtheria is a severe bacterial infection that causes a thick covering in the back of your mouth and throat  · Get a human papillomavirus (HPV) vaccine  if you are female and aged 23 to 32 or male 23 to 24 and never received it  This vaccine protects you from HPV infection  HPV is the most common infection spread by sexual contact  HPV may also cause vaginal, penile, and anal cancers  · Get a pneumococcal vaccine  if you are aged 72 years or older  The pneumococcal vaccine is an injection given to protect you from pneumococcal disease   Pneumococcal disease is an infection caused by pneumococcal bacteria  The infection may cause pneumonia, meningitis, or an ear infection  · Get a shingles vaccine  if you are 60 or older, even if you have had shingles before  The shingles vaccine is an injection to protect you from the varicella-zoster virus  This is the same virus that causes chickenpox  Shingles is a painful rash that develops in people who had chickenpox or have been exposed to the virus  How to eat healthy:  My Plate is a model for planning healthy meals  It shows the types and amounts of foods that should go on your plate  Fruits and vegetables make up about half of your plate, and grains and protein make up the other half  A serving of dairy is included on the side of your plate  The amount of calories and serving sizes you need depends on your age, gender, weight, and height  Examples of healthy foods are listed below:  · Eat a variety of vegetables  such as dark green, red, and orange vegetables  You can also include canned vegetables low in sodium (salt) and frozen vegetables without added butter or sauces  · Eat a variety of fresh fruits , canned fruit in 100% juice, frozen fruit, and dried fruit  · Include whole grains  At least half of the grains you eat should be whole grains  Examples include whole-wheat bread, wheat pasta, brown rice, and whole-grain cereals such as oatmeal     · Eat a variety of protein foods such as seafood (fish and shellfish), lean meat, and poultry without skin (turkey and chicken)  Examples of lean meats include pork leg, shoulder, or tenderloin, and beef round, sirloin, tenderloin, and extra lean ground beef  Other protein foods include eggs and egg substitutes, beans, peas, soy products, nuts, and seeds  · Choose low-fat dairy products such as skim or 1% milk or low-fat yogurt, cheese, and cottage cheese  · Limit unhealthy fats  such as butter, hard margarine, and shortening       Exercise:  Exercise at least 30 minutes per day on most days of the week  Some examples of exercise include walking, biking, dancing, and swimming  You can also fit in more physical activity by taking the stairs instead of the elevator or parking farther away from stores  Include muscle strengthening activities 2 days each week  Regular exercise provides many health benefits  It helps you manage your weight, and decreases your risk for type 2 diabetes, heart disease, stroke, and high blood pressure  Exercise can also help improve your mood  Ask your healthcare provider about the best exercise plan for you  General health and safety guidelines:   · Do not smoke  Nicotine and other chemicals in cigarettes and cigars can cause lung damage  Ask your healthcare provider for information if you currently smoke and need help to quit  E-cigarettes or smokeless tobacco still contain nicotine  Talk to your healthcare provider before you use these products  · Limit alcohol  A drink of alcohol is 12 ounces of beer, 5 ounces of wine, or 1½ ounces of liquor  · Lose weight, if needed  Being overweight increases your risk of certain health conditions  These include heart disease, high blood pressure, type 2 diabetes, and certain types of cancer  · Protect your skin  Do not sunbathe or use tanning beds  Use sunscreen with a SPF 15 or higher  Apply sunscreen at least 15 minutes before you go outside  Reapply sunscreen every 2 hours  Wear protective clothing, hats, and sunglasses when you are outside  · Drive safely  Always wear your seatbelt  Make sure everyone in your car wears a seatbelt  A seatbelt can save your life if you are in an accident  Do not use your cell phone when you are driving  This could distract you and cause an accident  Pull over if you need to make a call or send a text message  · Practice safe sex  Use latex condoms if are sexually active and have more than one partner   Your healthcare provider may recommend screening tests for sexually transmitted infections (STIs)  · Wear helmets, lifejackets, and protective gear  Always wear a helmet when you ride a bike or motorcycle, go skiing, or play sports that could cause a head injury  Wear protective equipment when you play sports  Wear a lifejacket when you are on a boat or doing water sports  © Copyright [employer] 2021 Information is for End User's use only and may not be sold, redistributed or otherwise used for commercial purposes  All illustrations and images included in CareNotes® are the copyrighted property of [employer]  or [facility] Vijaya Cedillo   The above information is an  only  It is not intended as medical advice for individual conditions or treatments  Talk to your doctor, nurse or pharmacist before following any medical regimen to see if it is safe and effective for you

## 2021-08-12 NOTE — PROGRESS NOTES
Daily Note     Today's date: 2021  Patient name: Katharina Bright  : 1955  MRN: [id number]  Referring provider: Hollis Herrera  Dx:   Encounter Diagnosis     ICD-10-CM    1  Nontraumatic complete tear of left rotator cuff  M75 122    2  S/P shoulder surgery  Z98 890                   Subjective:  Patient reports increase soreness in the left shoulder post last session with new strengthening exercises but overall happy to get the arm moving again  Objective: See treatment diary below      Assessment: Tolerated treatment well  PT notes continuation of progression of POC with focus on scapular stabilization and manual therapy to decrease pain levels and improve functional limitations to meet therapy goals  PT notes continuation of decrease ROM and strength t/o the left shoulder and scapula with need for continuation of skilled therapy  Plan: Continue per plan of care        Precautions:  PMH skin cancer and MI with cardiac stent       Manuals    Left shoulder and scapula mobs and stretching  15 min  15 min  15 min  15 min                         Neuro Re-Ed       Bent over row        Scapular pinch        Shoulder Flexion Wall Slides  IYT  2x10 Each  IYT  2x10 Each  2x10   IYT  Each  2x10 IYT Each    TB IR  DC      Wall push-up  2x15  2x15  2x15 DC   Supine stick press and flex  NT      S/L scapular 4 way  20x Each  20x Each  15x Each   1#  15x Each  1#    TB ER  2x10 Green Left Only  2x10 Green   Left Only  15x Each   TB Bilat ER and horizontal ABD Red  2x10 Each   Red                  Ther Ex       Table slides-flex and scap       Table circles        Shoulder shrug       UBE  10 min   90 resist   Alt  10 min   90 resist  Alt  10 min   80 resist Alt  10 Min   80 resist  Alt    TB MTP and LTP  2x10 Each   Blue  2x10   Each   Blue  2x15 Each   Blue  2x15   Each   Blue    Bicep curls        Pendulums-All Directions        Wrist rolls        Cane shoulder flex and ER Pulleys Flex        Supine press and flex with cane        TB Punch  45 degree up  2x10 Bilat Green  45 degree up 2x10 Bilat Green  45 degree up 2x10 Bilat  Blue  45 degree up 15x Bilat  Blue    Shoulder flex and ABD to 90  2x10 Each   Full range  15x Each   Full Range   1 5#  15x each Full Range   1 5#  2x10 Each Full Range   1 5#     press  10x Stick Only  2x10 Stick Only  15x 2# stick  2x10   2# Stick                  HEP update/review        Ther Activity                     Gait Training                     Modalities       CP to the left shoulder and UB in seated 15 min  15 min 15 min  15 min

## 2021-08-16 ENCOUNTER — OFFICE VISIT (OUTPATIENT)
Dept: PHYSICAL THERAPY | Facility: CLINIC | Age: 66
End: 2021-08-16
Payer: COMMERCIAL

## 2021-08-16 DIAGNOSIS — M75.122 NONTRAUMATIC COMPLETE TEAR OF LEFT ROTATOR CUFF: Primary | ICD-10-CM

## 2021-08-16 DIAGNOSIS — Z98.890 S/P SHOULDER SURGERY: ICD-10-CM

## 2021-08-16 PROCEDURE — 97110 THERAPEUTIC EXERCISES: CPT | Performed by: PHYSICAL THERAPIST

## 2021-08-16 PROCEDURE — 97140 MANUAL THERAPY 1/> REGIONS: CPT | Performed by: PHYSICAL THERAPIST

## 2021-08-16 NOTE — PROGRESS NOTES
Daily Note     Today's date: 2021  Patient name: Rebecca Watkins  : 1955  MRN: [id number]  Referring provider: Daja Hines  Dx:   Encounter Diagnosis     ICD-10-CM    1  Nontraumatic complete tear of left rotator cuff  M75 122    2  S/P shoulder surgery  Z98 890                   Subjective: The patient reports some soreness in his shoulder at start of session today  Work has been going okay  Objective: See treatment diary below      Assessment: Good tolerance to all TE today  Some verbal cues needed for correct TE with completing TE  He demonstrates decreased A/PROM for all planes and also weakness noted t/o his shoulder  CP x 15 minutes to end session  Continued PT would be beneficial to improve function  Plan: Continue per plan of care        Precautions:  PMH skin cancer and MI with cardiac stent       Manuals    Left shoulder and scapula mobs and stretching  15 min  15 min  15 min  15 min                         Neuro Re-Ed       Bent over row        Scapular pinch        Shoulder Flexion Wall Slides  IYT  2x10 Each  IYT  2x10 Each  2x10   IYT  Each  2x10 IYT Each    TB IR  DC      Wall push-up  D/C 2x15  2x15 DC   Supine stick press and flex        S/L scapular 4 way  15x Each 1# 20x Each  15x Each   1#  15x Each  1#    TB ER  2x10 each  TB ER and Horiz Abd  Red  2x10 Green   Left Only  15x Each   TB Bilat ER and horizontal ABD Red  2x10 Each   Red                  Ther Ex       Table slides-flex and scap       Table circles        Shoulder shrug       UBE  10 min   80 resist   Alt  10 min   90 resist  Alt  10 min   80 resist Alt  10 Min   80 resist  Alt    TB MTP and LTP  2x15 Each   Blue  2x10   Each   Blue  2x15 Each   Blue  2x15   Each   Blue    Bicep curls        Pendulums-All Directions        Wrist rolls        Cane shoulder flex and ER        Pulleys Flex        Supine press and flex with cane        TB Punch  45 degree up 15x Bilat Blue  45 degree up 2x10 Bilat Green  45 degree up 2x10 Bilat  Blue  45 degree up 15x Bilat  Blue    Shoulder flex and ABD to 90  2x10 Each   Full range   1 5# 15x Each   Full Range   1 5#  15x each Full Range   1 5#  2x10 Each Full Range   1 5#     press  2 x 10 2# stick 2x10 Stick Only  15x 2# stick  2x10   2# Stick                  HEP update/review        Ther Activity                     Gait Training                     Modalities       CP to the left shoulder and UB in seated 15 mins 15 min 15 min  15 min

## 2021-08-19 ENCOUNTER — OFFICE VISIT (OUTPATIENT)
Dept: PHYSICAL THERAPY | Facility: CLINIC | Age: 66
End: 2021-08-19
Payer: COMMERCIAL

## 2021-08-19 DIAGNOSIS — M75.122 NONTRAUMATIC COMPLETE TEAR OF LEFT ROTATOR CUFF: Primary | ICD-10-CM

## 2021-08-19 DIAGNOSIS — Z98.890 S/P SHOULDER SURGERY: ICD-10-CM

## 2021-08-19 PROCEDURE — 97112 NEUROMUSCULAR REEDUCATION: CPT

## 2021-08-19 PROCEDURE — 97110 THERAPEUTIC EXERCISES: CPT

## 2021-08-19 PROCEDURE — 97140 MANUAL THERAPY 1/> REGIONS: CPT

## 2021-08-19 NOTE — PROGRESS NOTES
Daily Note     Today's date: 2021  Patient name: Nigel Mendieta  : 1955  MRN: [id number]  Referring provider: Kathy Mei  Dx:   Encounter Diagnosis     ICD-10-CM    1  Nontraumatic complete tear of left rotator cuff  M75 122    2  S/P shoulder surgery  Z98 890                   Subjective: No new c/o pain today  Objective: See treatment diary below      Assessment: Patient tolerated treatments and exercise program well with no increase in symptoms  Patient able to tolerate increase in weights with no c/o pain  Plan: Continue per plan of care        Precautions:  PMH skin cancer and MI with cardiac stent       Manuals    Left shoulder and scapula mobs and stretching  15 min  15 min  15 min  15 min  15'                           Neuro Re-Ed        Bent over row         Scapular pinch         Shoulder Flexion Wall Slides  IYT  2x10 Each  IYT  2x10 Each  2x10   IYT  Each  2x10 IYT Each  2x10 ea IYT    TB IR  DC       Wall push-up  D/C  2x15 DC    Supine stick press and flex         S/L scapular 4 way  15x Each 1# 15x each 1# 15x Each   1#  15x Each  1#  15x ea 1 5#   TB ER  2x10 each  TB ER and Horiz Abd  Red  2x10 each   TB ER and Horiz Abd   Red 15x Each   TB Bilat ER and horizontal ABD Red  2x10 Each   Red  2x10 ea RED                   Ther Ex        Table slides-flex and scap        Table circles         Shoulder shrug        UBE  10 min   80 resist   Alt  10 min   80 resist  Alt  10 min   80 resist Alt  10 Min   80 resist  Alt  10' 80resist alt   TB MTP and LTP  2x15 Each   Blue  2 x 15 each Blue 2x15 Each   Blue  2x15   Each   Blue  2x15 ea BLUE   Bicep curls         Pendulums-All Directions         Wrist rolls         Cane shoulder flex and ER         Pulleys Flex         Supine press and flex with cane         TB Punch  45 degree up 15x Bilat Blue  45 degree up 15x Bilat Blue 45 degree up 2x10 Bilat  Blue  45 degree up 15x Bilat  Blue  45 degree up 15x bilat BLUE   Shoulder flex and ABD to 90  2x10 Each   Full range   1 5# 2 x 10 Each   Full Range   1 5#  15x each Full Range   1 5#  2x10 Each Full Range   1 5#  15x ea full range 2#    press  2 x 10 2# stick 2x10 2# Stick Only  15x 2# stick  2x10   2# Stick  2x10 2#stick                   HEP update/review         Ther Activity     8/19                   Gait Training     8/19                   Modalities     8/19   CP to the left shoulder and UB in seated 15 mins 15 mins 15 min  15 min  15'

## 2021-08-23 ENCOUNTER — OFFICE VISIT (OUTPATIENT)
Dept: PHYSICAL THERAPY | Facility: CLINIC | Age: 66
End: 2021-08-23
Payer: COMMERCIAL

## 2021-08-23 DIAGNOSIS — M75.122 NONTRAUMATIC COMPLETE TEAR OF LEFT ROTATOR CUFF: Primary | ICD-10-CM

## 2021-08-23 DIAGNOSIS — Z98.890 S/P SHOULDER SURGERY: ICD-10-CM

## 2021-08-23 PROCEDURE — 97112 NEUROMUSCULAR REEDUCATION: CPT

## 2021-08-23 PROCEDURE — 97110 THERAPEUTIC EXERCISES: CPT

## 2021-08-23 PROCEDURE — 97140 MANUAL THERAPY 1/> REGIONS: CPT

## 2021-08-23 NOTE — PROGRESS NOTES
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, however, this remains tolerable  Patient demonstrated good technique when performing standing TE  Patient would benefit from continued PT to increase L shoulder strength and ROM for improved function in ADLs  Plan: Continue per plan of care        Precautions:  PMH skin cancer and MI with cardiac stent        Manuals    Left shoulder and scapula mobs and stretching  15 min  15 min  15 mins 15 min  15'                           Neuro Re-Ed        Bent over row         Scapular pinch         Shoulder Flexion Wall Slides  IYT  2x10 Each  IYT  2x10 Each  IYT  2x10 Each 2x10 IYT Each  2x10 ea IYT    TB IR  DC       Wall push-up  D/C   DC    Supine stick press and flex         S/L scapular 4 way  15x Each 1# 15x each 1# 15x each 1# 15x Each  1#  15x ea 1 5#   TB ER  2x10 each  TB ER and Horiz Abd  Red  2x10 each   TB ER and Horiz Abd   Red 2x10 each   TB ER and Horiz Abd   Red 2x10 Each   Red  2x10 ea RED                   Ther Ex        Table slides-flex and scap        Table circles         Shoulder shrug        UBE  10 min   80 resist   Alt  10 min   80 resist  Alt  10 min   80 resist  Alt  10 Min   80 resist  Alt  10' 80resist alt   TB MTP and LTP  2x15 Each   Blue  2 x 15 each Blue 2 x 15 each Blue 2x15   Each   Blue  2x15 ea BLUE   Bicep curls         Pendulums-All Directions         Wrist rolls         Cane shoulder flex and ER Pulleys Flex         Supine press and flex with cane         TB Punch  45 degree up 15x Bilat Blue  45 degree up 15x Bilat Blue 45 degree up 15x Bilat Blue 45 degree up 15x Bilat  Blue  45 degree up 15x bilat BLUE   Shoulder flex and ABD to 90  2x10 Each   Full range   1 5# 2 x 10 Each   Full Range   1 5#  2 x 10 Each   Full Range   1 5#  2x10 Each Full Range   1 5#  15x ea full range 2#    press  2 x 10 2# stick 2x10 2# Stick Only  2 x 10 2# stick 2x10   2# Stick  2x10 2#stick   [employer] son shelf   All 3 shelves 12 cones              HEP update/review         Ther Activity     8/19                   Gait Training     8/19                   Modalities     8/19   CP to the left shoulder and UB in seated 15 mins 15 mins 15 mins 15 min  15'

## 2021-08-26 ENCOUNTER — OFFICE VISIT (OUTPATIENT)
Dept: PHYSICAL THERAPY | Facility: CLINIC | Age: 66
End: 2021-08-26
Payer: COMMERCIAL

## 2021-08-26 DIAGNOSIS — M75.122 NONTRAUMATIC COMPLETE TEAR OF LEFT ROTATOR CUFF: Primary | ICD-10-CM

## 2021-08-26 DIAGNOSIS — Z98.890 S/P SHOULDER SURGERY: ICD-10-CM

## 2021-08-26 PROCEDURE — 97112 NEUROMUSCULAR REEDUCATION: CPT | Performed by: PHYSICAL THERAPIST

## 2021-08-26 PROCEDURE — 97110 THERAPEUTIC EXERCISES: CPT | Performed by: PHYSICAL THERAPIST

## 2021-08-26 PROCEDURE — 97140 MANUAL THERAPY 1/> REGIONS: CPT | Performed by: PHYSICAL THERAPIST

## 2021-08-26 NOTE — PROGRESS NOTES
Daily Note     Today's date: 2021  Patient name: Jenny Whitley  : 1955  MRN: [id number]  Referring provider: Florentino Adams  Dx:   Encounter Diagnosis     ICD-10-CM    1  Nontraumatic complete tear of left rotator cuff  M75 122    2  S/P shoulder surgery  Z98 890                   Subjective:  Patient reports the shoulder is moving better but continuation of difficulty lifting and push/pull activities  Objective: See treatment diary below      Assessment: Tolerated treatment well  PT notes continuation of progression of POC with focus on scapular stabilization and manual therapy to decrease pain levels and improve functional limitations to meet therapy goals  PT notes improved ROM but continuation of decrease strength and stability t/o the left shoulder and scapula with need for continuation of skilled therapy  Plan: Continue per plan of care        Precautions:  PMH skin cancer and MI with cardiac stent        Manuals      Left shoulder and scapula mobs and stretching  15 min  15 mins 15 min                              Neuro Re-Ed        Bent over row         Scapular pinch         Shoulder Flexion Wall Slides  IYT  2x10 Each  IYT  2x10 Each IYT   2x10 Each      TB IR         Wall push-up  DC       Supine stick press and flex         S/L scapular 4 way  15x each 1# 15x each 1# 2x10 Each   1#      TB ER  2x10 each   TB ER and Horiz Abd   Red 2x10 each   TB ER and Horiz Abd   Red 2x10 Each   ER and Horizontal ABD   Red                     Ther Ex        Table slides-flex and scap        Table circles         Shoulder shrug        UBE  10 min   80 resist  Alt  10 min   80 resist  Alt  10 min   80 resist   Alt      TB MTP and LTP  2 x 15 each Blue 2 x 15 each Blue 2x15 Each   Blue      Bicep curls         Pendulums-All Directions         Wrist rolls         Cane shoulder flex and ER         Pulleys Flex         Supine press and flex with cane         TB Punch  45 degree up 15x Bilat Blue 45 degree up 15x Bilat Blue 2x10 Bilat  45 degree up      Shoulder flex and ABD to 90  2 x 10 Each   Full Range   1 5#  2 x 10 Each   Full Range   1 5#  2x10 Each   Full Range  2#       press  2x10 2# Stick Only  2 x 10 2# stick 2x10 5# Stick      Cone son shelf  All 3 shelves 12 cones  All 3 shelves  12 cones              HEP update/review         Ther Activity                        Gait Training                        Modalities        CP to the left shoulder and UB in seated 15 mins 15 mins 15 min

## 2021-08-30 ENCOUNTER — EVALUATION (OUTPATIENT)
Dept: PHYSICAL THERAPY | Facility: CLINIC | Age: 66
End: 2021-08-30
Payer: COMMERCIAL

## 2021-08-30 DIAGNOSIS — M75.122 NONTRAUMATIC COMPLETE TEAR OF LEFT ROTATOR CUFF: Primary | ICD-10-CM

## 2021-08-30 DIAGNOSIS — Z98.890 S/P SHOULDER SURGERY: ICD-10-CM

## 2021-08-30 PROCEDURE — 97140 MANUAL THERAPY 1/> REGIONS: CPT | Performed by: PHYSICAL THERAPIST

## 2021-08-30 PROCEDURE — 97112 NEUROMUSCULAR REEDUCATION: CPT

## 2021-08-30 PROCEDURE — 97110 THERAPEUTIC EXERCISES: CPT

## 2021-08-30 PROCEDURE — 97140 MANUAL THERAPY 1/> REGIONS: CPT

## 2021-08-30 NOTE — PROGRESS NOTES
PT Re-Evaluation     Today's date: 2021  Patient name: Nigel Mendieta  : 1955  MRN: [id number]  Referring provider: Kathy Mei  Dx:   Encounter Diagnosis     ICD-10-CM    1  Nontraumatic complete tear of left rotator cuff  M75 122    2  S/P shoulder surgery  Z98 890                   Assessment  Assessment details: PT notes the patient with continuation of decrease ROM and strength t/o the left shoulder and scapula with need for continuation of skilled therapy for 4 weeks with focus on scapular stabilization, posture, manual therapy, analgesic modalities, and update/review of HEP  Impairments: abnormal or restricted ROM, activity intolerance, impaired physical strength, lacks appropriate home exercise program, pain with function, scapular dyskinesis and poor posture   Understanding of Dx/Px/POC: good   Prognosis: good    Goals  ST  Initiate HEP-MET  2  Decrease pain levels by 25-50%-Partial MET   3  Increase ROM by 25-50%-Partial MET  4  Increase strength by 1-2 mm grades-Partial MET  LT  Improve postural awareness-Partial MET  2  Improve scapular stability-Partial MET  3  Decrease limitations with reaching, carrying, and lifting-NOT MET  4  Decrease limitations with OH and push/pull activities-NOT MET  5  Decrease limitations with ADL-Partial MET  6  RTE partial/full Duty-NOT MET   7   DC with HEP-Progressing     Plan  Plan details: PT notes review of POC and findings with patient who is in agreement with PT recommendations of continuation of skilled therapy      Patient would benefit from: skilled physical therapy  Planned modality interventions: cryotherapy  Planned therapy interventions: manual therapy, neuromuscular re-education, patient education, self care, strengthening, stretching, therapeutic exercise, home exercise program, graded exercise and flexibility  Frequency: 2x week  Duration in weeks: 4  Treatment plan discussed with: patient        Subjective Evaluation    History of Present Illness  Date of surgery: 2021  Mechanism of injury: surgery  Mechanism of injury: Patient reports development of left shoulder pain about 6 months ago from insidious onset with progressive worsening over time leading to limitations with left shoulder movement and increase pain levels  Patient went to ortho MD for evaluation and was treated with analgesic injection and course of OPPT with minimal to no change in status so patient was evaluated by x-ray with findings with complete RC tear with need for surgical repair  Patient underwent the procedure on 21 and is now p/o with orders for OPPT  Patient reports he is presently OOW as  at [facility] with no set RTW date  Patient reports significant PMH of MI with cardiac stent, HBP, DM, GERD, macular degeneration and skin cancer  Presently the patient has attended 21 sessions of skilled therapy and feels 70-75% improvement since the start of therapy with some decrease in pain levels but continuation of weakness with limited movement of the left shoulder and UE  Patient feels he requires more therapy to continue to get stronger and more movement in the left shoulder so he can RTW full duty and return to pre-injury status  Patient reports he is RTW on 21 with restricted status of 5# weight restriction      Pain  Current pain ratin  At best pain ratin  At worst pain ratin  Location: Left Shoulder   Quality: sharp and radiating  Relieving factors: rest and medications  Aggravating factors: lifting and overhead activity  Progression: improved      Diagnostic Tests  MRI studies: abnormal  Treatments  Previous treatment: physical therapy, medication and injection treatment  Current treatment: immobilization and medication  Patient Goals  Patient goals for therapy: decreased pain, increased motion, increased strength, independence with ADLs/IADLs and return to work          Objective     Postural Observations  Seated posture: fair  Standing posture: fair    Additional Postural Observation Details  PT notes bilateral rounded shoulders, forward head and bilateral protracted scapula     Tenderness     Left Shoulder   No tenderness     Neurological Testing     Reflexes   Left   Biceps (C5/C6): normal (2+)  Brachioradialis (C6): normal (2+)    Right   Biceps (C5/C6): normal (2+)  Brachioradialis (C6): normal (2+)    Active Range of Motion   Cervical/Thoracic Spine       Cervical    Flexion:  WFL  Extension:  WFL  Left lateral flexion:  WFL  Right lateral flexion:  WFL  Left rotation:  WFL  Right rotation:  WFL  Left Shoulder   Flexion: 144 degrees with pain  Extension: WFL  Abduction: 126 degrees with pain  External rotation 45°: 41 degrees with pain  Internal rotation 45°: 53 degrees with pain    Left Elbow   Normal active range of motion    Passive Range of Motion   Left Shoulder   Flexion: 158 degrees with pain  Extension: WFL and with pain  Abduction: 151 degrees with pain  External rotation 45°: 52 degrees with pain  Internal rotation 45°: 55 degrees with pain    Additional Passive Range of Motion Details  PT notes continuation of decrease ROM and strength t/o the left shoulder and scapula     Scapular Mobility   Left Shoulder   Scapular Dyskinesis: grade I  Scapular mobility: good    Strength/Myotome Testing     Left Shoulder     Planes of Motion   Flexion: 4-   Extension: 4   Abduction: 3+   Adduction: 4   External rotation at 0°: 3+   Internal rotation at 0°: 4-   Internal rotation at 90°: 4-     Left Elbow   Flexion: 4+  Extension: 4             Precautions:  PMH skin cancer and MI with cardiac stent

## 2021-08-30 NOTE — PROGRESS NOTES
Daily Note     Today's date: 2021  Patient name: Rambo Talley  : 1955  MRN: [id number]  Referring provider: Karlene Grubbs  Dx:   Encounter Diagnosis     ICD-10-CM    1  Nontraumatic complete tear of left rotator cuff  M75 122    2  S/P shoulder surgery  Z98 890                   Subjective: Patient reports L shoulder was a little sore last night  Patient states this pain has gone down since and he does not have any significant pain upon entering clinic  Objective: See treatment diary below      Assessment: Tolerated treatment well  Patient demonstrated increased tolerance to cone stacking on shelf compared to previous session  Patient would benefit from continued PT to increase L shoulder strength and ROM for improved function in ADLs  Plan: Continue per plan of care        Precautions:  PMH skin cancer and MI with cardiac stent        Manuals     Left shoulder and scapula mobs and stretching  15 min  15 mins 15 min  15 min                            Neuro Re-Ed        Bent over row         Scapular pinch         Shoulder Flexion Wall Slides  IYT  2x10 Each  IYT  2x10 Each IYT   2x10 Each  IYT   2x10 Each     TB IR         Wall push-up  DC       Supine stick press and flex         S/L scapular 4 way  15x each 1# 15x each 1# 2x10 Each   1#  2x10 Each   1#     TB ER  2x10 each   TB ER and Horiz Abd   Red 2x10 each   TB ER and Horiz Abd   Red 2x10 Each   ER and Horizontal ABD   Red 2x10 Each   ER and Horizontal ABD   Red                    Ther Ex        Table slides-flex and scap        Table circles         Shoulder shrug        UBE  10 min   80 resist  Alt  10 min   80 resist  Alt  10 min   80 resist   Alt  10 min   80 resist   Alt     TB MTP and LTP  2 x 15 each Blue 2 x 15 each Blue 2x15 Each   Blue  2x15 Each   Blue    Bicep curls         Pendulums-All Directions         Wrist rolls         Cane shoulder flex and ER         Pulleys Flex         Supine press and flex with cane         TB Punch  45 degree up 15x Bilat Blue 45 degree up 15x Bilat Blue 2x10 Bilat  45 degree up  45 degree up 15x Bilat Blue    Shoulder flex and ABD to 90  2 x 10 Each   Full Range   1 5#  2 x 10 Each   Full Range   1 5#  2x10 Each   Full Range  2#  2x10 Each   Full Range  2#      press  2x10 2# Stick Only  2 x 10 2# stick 2x10 5# Stick  2x10 5# Stick     Cones on shelf  All 3 shelves 12 cones  All 3 shelves  12 cones  All 3 shelves  12 cones             HEP update/review         Ther Activity                        Gait Training                        Modalities        CP to the left shoulder and UB in seated 15 mins 15 mins 15 min  15 min

## 2021-09-02 ENCOUNTER — OFFICE VISIT (OUTPATIENT)
Dept: PHYSICAL THERAPY | Facility: CLINIC | Age: 66
End: 2021-09-02
Payer: COMMERCIAL

## 2021-09-02 DIAGNOSIS — Z98.890 S/P SHOULDER SURGERY: ICD-10-CM

## 2021-09-02 DIAGNOSIS — M75.122 NONTRAUMATIC COMPLETE TEAR OF LEFT ROTATOR CUFF: Primary | ICD-10-CM

## 2021-09-02 PROCEDURE — 97140 MANUAL THERAPY 1/> REGIONS: CPT

## 2021-09-02 PROCEDURE — 97110 THERAPEUTIC EXERCISES: CPT

## 2021-09-02 PROCEDURE — 97112 NEUROMUSCULAR REEDUCATION: CPT

## 2021-09-02 NOTE — PROGRESS NOTES
Daily Note     Today's date: 2021  Patient name: Erendira Choudhary  : 1955  MRN: [id number]  Referring provider: Hamilton Rae  Dx:   Encounter Diagnosis     ICD-10-CM    1  Nontraumatic complete tear of left rotator cuff  M75 122    2  S/P shoulder surgery  Z98 890                   Subjective: Patient reports L shoulder is feeling pretty good  Patient reports 1/10 pain with movement and no pain when it is still  Objective: See treatment diary below      Assessment: Tolerated treatment well  Patient demonstrated good technique throughout TE program  Patient noted mild discomfort/tightness when performing cone stack on third shelf  Patient demonstrates increased tolerance to manual stretching into flexion compared to previous session  Patient would benefit from continued PT to increase L shoulder strength and mobility for improved function inADLs  Plan: Continue per plan of care        Precautions:  PMH skin cancer and MI with cardiac stent        Manuals  9   Left shoulder and scapula mobs and stretching  15 min  15 mins 15 min  15 min 15 min                           Neuro Re-Ed        Bent over row         Scapular pinch         Shoulder Flexion Wall Slides  IYT  2x10 Each  IYT  2x10 Each IYT   2x10 Each  IYT   2x10 Each  IYT   2x10 Each    TB IR         Wall push-up  DC       Supine stick press and flex         S/L scapular 4 way  15x each 1# 15x each 1# 2x10 Each   1#  2x10 Each   1#  2x10 Each   1 5#    TB ER  2x10 each   TB ER and Horiz Abd   Red 2x10 each   TB ER and Horiz Abd   Red 2x10 Each   ER and Horizontal ABD   Red 2x10 Each   ER and Horizontal ABD   Red 2x10 Each   ER and Horizontal ABD   Red                   Ther Ex        Table slides-flex and scap        Table circles         Shoulder shrug        UBE  10 min   80 resist  Alt  10 min   80 resist  Alt  10 min   80 resist   Alt  10 min   80 resist   Alt  10 min   80 resist   Alt    TB MTP and LTP  2 x 15 each Blue 2 x 15 each Blue 2x15 Each   Blue  2x15 Each   Blue 2x15 Each   Blue   Bicep curls         Pendulums-All Directions         Wrist rolls         Cane shoulder flex and ER         Pulleys Flex         Supine press and flex with cane         TB Punch  45 degree up 15x Bilat Blue 45 degree up 15x Bilat Blue 2x10 Bilat  45 degree up  45 degree up 15x Bilat Blue 45 degree up 2x10 Bilat Blue   Shoulder flex and ABD to 90  2 x 10 Each   Full Range   1 5#  2 x 10 Each   Full Range   1 5#  2x10 Each   Full Range  2#  2x10 Each   Full Range  2#  2x10 Each   Full Range  2#     press  2x10 2# Stick Only  2 x 10 2# stick 2x10 5# Stick  2x10 5# Stick  2x10 5# Stick    Cones on shelf  All 3 shelves 12 cones  All 3 shelves  12 cones  All 3 shelves  12 cones  All 3 shelves  12 cones            HEP update/review         Ther Activity                        Gait Training                        Modalities        CP to the left shoulder and UB in seated 15 mins 15 mins 15 min  15 min  15 min

## 2021-09-03 ENCOUNTER — OFFICE VISIT (OUTPATIENT)
Dept: OBGYN CLINIC | Facility: CLINIC | Age: 66
End: 2021-09-03
Payer: COMMERCIAL

## 2021-09-03 VITALS
DIASTOLIC BLOOD PRESSURE: 70 MMHG | HEIGHT: 67 IN | TEMPERATURE: 97.2 F | HEART RATE: 82 BPM | WEIGHT: 204 LBS | BODY MASS INDEX: 32.02 KG/M2 | SYSTOLIC BLOOD PRESSURE: 110 MMHG

## 2021-09-03 DIAGNOSIS — M75.122 NONTRAUMATIC COMPLETE TEAR OF LEFT ROTATOR CUFF: Primary | ICD-10-CM

## 2021-09-03 PROCEDURE — 99213 OFFICE O/P EST LOW 20 MIN: CPT | Performed by: ORTHOPAEDIC SURGERY

## 2021-09-03 PROCEDURE — 3008F BODY MASS INDEX DOCD: CPT | Performed by: ORTHOPAEDIC SURGERY

## 2021-09-03 PROCEDURE — 3078F DIAST BP <80 MM HG: CPT | Performed by: ORTHOPAEDIC SURGERY

## 2021-09-03 PROCEDURE — 3008F BODY MASS INDEX DOCD: CPT | Performed by: NURSE PRACTITIONER

## 2021-09-03 PROCEDURE — 3074F SYST BP LT 130 MM HG: CPT | Performed by: ORTHOPAEDIC SURGERY

## 2021-09-03 PROCEDURE — 1036F TOBACCO NON-USER: CPT | Performed by: ORTHOPAEDIC SURGERY

## 2021-09-03 NOTE — PROGRESS NOTES
ASSESSMENT/PLAN:    Diagnoses and all orders for this visit:    Nontraumatic complete tear of left rotator cuff          Plan:  I will see Ashley Lopez in about 1 month  He is to continue physical therapy  A work note was provided allowing him to increase his activity level but still on restricted duty  He is to contact me if questions or concerns arise  If he does see improvement and feels that he can increase his workload, the office could be contacted and a new note provided  Physical therapy notes were reviewed and I would recommend that he continue working with physical therapy as well as a home exercise program     Return in about 4 weeks (around 10/1/2021)  _____________________________________________________  CHIEF COMPLAINT:  Chief Complaint   Patient presents with    Follow-up         SUBJECTIVE:  Marychuy Garcia is a 61y o  year old male who presents for follow up   Of his left rotator cuff repair  He is now 4 months postop  He is doing well but does note some intermittent pain  Pain will generally occur daily but is short-lived  This primarily occurs with activity, especially overhead activity  He denies any paresthesias  He was permitted to return to work in a limited duty capacity  PAST MEDICAL HISTORY:  Past Medical History:   Diagnosis Date    Allergic Seasonal    Anesthesia     Pt reports that he became agitated after IV sedation for a colonoscopy    Coronary artery disease 08/2014    COVID-19 01/2021    Pt reports fever, fatigue, nausea and weakness  Pt denies breathing issues  Symptoms lasted 2 weeks      [facility] [address] [facility]     GERD (gastroesophageal reflux disease)     Hard to intubate     Pt received a letter stating that it was hard to visualize vocal chords    Heart attack ([address] )     High cholesterol     Hypertension     Myocardial infarction ([facility] [address] ) 08/2014    Stent    Wears glasses        PAST SURGICAL HISTORY:  Past Surgical History:   Procedure Laterality Date  CARDIAC CATHETERIZATION  2014    Pt reports having one stent placed   CHOLECYSTECTOMY      COLONOSCOPY      PA SURGICAL ARTHROSCOPY SHOULDER XTNSV DBRDMT 3+ Left 2021    Procedure: ARTHROSCOPY SHOULDER , debridement; open rotator cuff repair;  Surgeon: Soco Daly; Location: [facility];  Service: Orthopedics    VASECTOMY      VASECTOMY REVERSAL         FAMILY HISTORY:  Family History   Problem Relation Age of Onset    Dementia Mother     No Known Problems Father        SOCIAL HISTORY:  Social History     Tobacco Use    Smoking status: Former Smoker     Packs/day: 0 00     Years: 30 00     Pack years: 0 00     Types: Cigarettes     Quit date: 2015     Years since quittin 5    Smokeless tobacco: Never Used   Vaping Use    Vaping Use: Never used   Substance Use Topics    Alcohol use:  Yes     Alcohol/week: 5 0 standard drinks     Types: 5 Cans of beer per week     Comment: occasionally    Drug use: Never       MEDICATIONS:    Current Outpatient Medications:     aspirin (Aspirin Adult Low Strength) 81 mg EC tablet, Take 81 mg by mouth daily Pt stopped on May 2 for surgery on , Disp: , Rfl:     atorvastatin (LIPITOR) 80 mg tablet, Take 1 tablet (80 mg total) by mouth daily, Disp: 90 tablet, Rfl: 1    carvedilol (COREG) 25 mg tablet, Take 1 tablet (25 mg total) by mouth 2 (two) times a day with meals, Disp: 90 tablet, Rfl: 1    citalopram (CeleXA) 10 mg tablet, Take 1 tablet (10 mg total) by mouth daily, Disp: 90 tablet, Rfl: 1    Coenzyme Q10 (CoQ-10) 100 MG CAPS, Take 100 mg by mouth daily , Disp: , Rfl:     esomeprazole (NexIUM) 5 MG packet, Take 5 mg by mouth daily, Disp: 90 each, Rfl: 1    Farxiga 10 MG TABS, Take 1 tablet (10 mg total) by mouth daily, Disp: 90 tablet, Rfl: 1    hydrochlorothiazide (HYDRODIURIL) 25 mg tablet, Take 1 tablet (25 mg total) by mouth daily, Disp: 90 tablet, Rfl: 1    Multiple Vitamins-Minerals (PreserVision AREDS 2) CAPS, Take 1 capsule by mouth daily , Disp: , Rfl:     Ozempic, 1 MG/DOSE, 2 MG/1 5ML SOPN, 1 mg once a week , Disp: , Rfl:     ramipril (ALTACE) 10 MG capsule, Take 1 capsule (10 mg total) by mouth daily, Disp: 90 capsule, Rfl: 1    ALLERGIES:  Allergies   Allergen Reactions    Food Allergy [Soybean Oil - Food Allergy] Anaphylaxis     Organic soy milk  Pt reports having allergy shots at that time  REVIEW OF SYSTEMS:  Pertinent items are noted in HPI  A comprehensive review of systems was negative       _____________________________________________________  PHYSICAL EXAMINATION:  General: alert, oriented times 3 and appears comfortable  Psychiatric: Normal  HEENT:  Normocephalic, atraumatic  Cardiovascular:  Regular  Pulmonary: No wheezing or stridor  Skin: No masses, erthema, lacerations, fluctation, ulcerations  Neurovascular: Motor and sensory exams are grossly intact including excellent strength of left shoulder motors  Pulses are palpable  MUSCULOSKELETAL EXAMINATION:      Left shoulder exam demonstrates a well-healed incision  There is no significant swelling  He has abduction to 110°, forward flexion to about 120° complaining of tightness with both motions  Has good abduction IR but limited abduction ER  He has good strength of resisted external rotation, abduction, forward flexion, extension and internal rotation  Distal sensation is intact  The remainder of the upper extremity examination bilaterally is benign            Shanti

## 2021-09-03 NOTE — LETTER
September 3, 2021     Patient: Silver Caballero   YOB: 1955   Date of Visit: 9/3/2021       To Whom it May Concern:    Silver Caballero is under my professional care  He was seen in my office on 9/3/2021  He may return to work on 09/03/2021  He is permitted to use the left upper extremity above shoulder height and is permitted to lift up to 5 lb with the left upper extremity  He is allowed full use of the right upper extremity and bilateral lower extremties       If you have any questions or concerns, please don't hesitate to call           Sincerely,          King Jerod        CC: No Recipients

## 2021-09-07 ENCOUNTER — OFFICE VISIT (OUTPATIENT)
Dept: PHYSICAL THERAPY | Facility: CLINIC | Age: 66
End: 2021-09-07
Payer: COMMERCIAL

## 2021-09-07 DIAGNOSIS — M75.122 NONTRAUMATIC COMPLETE TEAR OF LEFT ROTATOR CUFF: Primary | ICD-10-CM

## 2021-09-07 DIAGNOSIS — Z98.890 S/P SHOULDER SURGERY: ICD-10-CM

## 2021-09-07 PROCEDURE — 97110 THERAPEUTIC EXERCISES: CPT

## 2021-09-07 PROCEDURE — 97112 NEUROMUSCULAR REEDUCATION: CPT

## 2021-09-07 PROCEDURE — 97140 MANUAL THERAPY 1/> REGIONS: CPT

## 2021-09-07 NOTE — PROGRESS NOTES
Daily Note     Today's date: 2021  Patient name: Magdalena Hazel  : 1955  MRN: [id number]  Referring provider: Patsey Opitz  Dx:   Encounter Diagnosis     ICD-10-CM    1  Nontraumatic complete tear of left rotator cuff  M75 122    2  S/P shoulder surgery  Z98 890        Start Time: 1550  Stop Time: 1645  Total time in clinic (min): 55 minutes    Subjective: Pt reports mild soreness in L shoulder after riding his bike over the weekend      Objective: See treatment diary below      Assessment: Pt tolerated treatment session well today  Continues with improvements in all planes of ROM; added ER stretch this visit in order to help with basic ADL tasks  Pt would benefit from continued OP PT services  Plan: Continue per plan of care        Precautions:  PMH skin cancer and MI with cardiac stent        Manuals    Left shoulder and scapula mobs and stretching  15 min  15 mins 15 min  15 min 15 min 15 mins                              Neuro Re-Ed         Bent over row          Scapular pinch          Shoulder Flexion Wall Slides  IYT  2x10 Each  IYT  2x10 Each IYT   2x10 Each  IYT   2x10 Each  IYT   2x10 Each  IYT   2x10 Each    TB IR          Wall push-up  DC        Supine stick press and flex          S/L scapular 4 way  15x each 1# 15x each 1# 2x10 Each   1#  2x10 Each   1#  2x10 Each   1 5#  2x10 Each   1 5#   TB ER  2x10 each   TB ER and Horiz Abd   Red 2x10 each   TB ER and Horiz Abd   Red 2x10 Each   ER and Horizontal ABD   Red 2x10 Each   ER and Horizontal ABD   Red 2x10 Each   ER and Horizontal ABD   Red 2x10 Each   ER and Horizontal ABD   Red                     Ther Ex         Table slides-flex and scap         Table circles          Shoulder shrug         UBE  10 min   80 resist  Alt  10 min   80 resist  Alt  10 min   80 resist   Alt  10 min   80 resist   Alt  10 min   80 resist   Alt  10 min   80 resist   Alt    TB MTP and LTP  2 x 15 each Blue 2 x 15 each Blue 2x15 Each   Blue  2x15 Each   Blue 2x15 Each   Blue 2x15 Each   Blue   Bicep curls          Pendulums-All Directions          Wrist rolls          Cane shoulder flex and ER          Pulleys Flex          Supine press and flex with cane          TB Punch  45 degree up 15x Bilat Blue 45 degree up 15x Bilat Blue 2x10 Bilat  45 degree up  45 degree up 15x Bilat Blue 45 degree up 2x10 Bilat Blue 45 degree up 2x10 Bilat Blue   Shoulder flex and ABD to 90  2 x 10 Each   Full Range   1 5#  2 x 10 Each   Full Range   1 5#  2x10 Each   Full Range  2#  2x10 Each   Full Range  2#  2x10 Each   Full Range  2#  2x15 Each   Full Range  2#    press  2x10 2# Stick Only  2 x 10 2# stick 2x10 5# Stick  2x10 5# Stick  2x10 5# Stick  3x10 5# Stick   Cones on shelf  All 3 shelves 12 cones  All 3 shelves  12 cones  All 3 shelves  12 cones  All 3 shelves  12 cones  All 3 shelves  12 cones    Sleeper ER stretch      :30x2   HEP update/review          Ther Activity                           Gait Training                           Modalities         CP to the left shoulder and UB in seated 15 mins 15 mins 15 min  15 min  15 min 15 mins

## 2021-09-09 ENCOUNTER — OFFICE VISIT (OUTPATIENT)
Dept: PHYSICAL THERAPY | Facility: CLINIC | Age: 66
End: 2021-09-09
Payer: COMMERCIAL

## 2021-09-09 DIAGNOSIS — Z98.890 S/P SHOULDER SURGERY: ICD-10-CM

## 2021-09-09 DIAGNOSIS — M75.122 NONTRAUMATIC COMPLETE TEAR OF LEFT ROTATOR CUFF: Primary | ICD-10-CM

## 2021-09-09 PROCEDURE — 97110 THERAPEUTIC EXERCISES: CPT | Performed by: PHYSICAL THERAPIST

## 2021-09-09 PROCEDURE — 97140 MANUAL THERAPY 1/> REGIONS: CPT | Performed by: PHYSICAL THERAPIST

## 2021-09-09 PROCEDURE — 97112 NEUROMUSCULAR REEDUCATION: CPT | Performed by: PHYSICAL THERAPIST

## 2021-09-09 NOTE — PROGRESS NOTES
Daily Note     Today's date: 2021  Patient name: Magdalena Hazel  : 1955  MRN: [id number]  Referring provider: Patsey Opitz  Dx:   Encounter Diagnosis     ICD-10-CM    1  Nontraumatic complete tear of left rotator cuff  M75 122    2  S/P shoulder surgery  Z98 890                   Subjective:  Patient reports he was sore after the last session in bilateral shoulders to 4/10 level  Patient reports continuation of limitations with OH and pushing activities  Objective: See treatment diary below      Assessment: Tolerated treatment well  PT notes continuation of progression of POC with focus on scapular stabilization and manual therapy to decrease pain levels and improve functional limitations to meet therapy goals  PT notes continuation of decrease ROM and strength t/o the left shoulder and scapula with need for continuation of skilled therapy  Plan: Continue per plan of care        Precautions:  PMH skin cancer and MI with cardiac stent        Manuals    Left shoulder and scapula mobs and stretching  15 min  15 min 15 min 15 mins 15 min                            Neuro Re-Ed        Bent over row         Scapular pinch         Shoulder Flexion Wall Slides  IYT   2x10 Each  IYT   2x10 Each  IYT   2x10 Each  IYT   2x10 Each  IYT  2x10 Each    TB IR         Wall push-up         Supine stick press and flex         S/L scapular 4 way  2x10 Each   1#  2x10 Each   1#  2x10 Each   1 5#  2x10 Each   1 5# 2x10 Each   1 5#    TB ER  2x10 Each   ER and Horizontal ABD   Red 2x10 Each   ER and Horizontal ABD   Red 2x10 Each   ER and Horizontal ABD   Red 2x10 Each   ER and Horizontal ABD   Red 2x10 Each   Red                    Ther Ex        Table slides-flex and scap        Table circles         Shoulder shrug        UBE  10 min   80 resist   Alt  10 min   80 resist   Alt  10 min   80 resist   Alt  10 min   80 resist   Alt  10 min   80 resist  Alt    TB MTP and LTP  2x15 Each   Blue  2x15 Each   Blue 2x15 Each   Blue 2x15 Each   Blue 2x15 Each   [employer] Carry      5# 60 Feet Carry   3# 60 Feet Carry    Pendulums-All Directions         Wrist rolls         Cane shoulder flex and ER         Pulleys Flex         Supine press and flex with cane         TB Punch  2x10 Bilat  45 degree up  45 degree up 15x Bilat Blue 45 degree up 2x10 Bilat Blue 45 degree up 2x10 Bilat Blue 45 Degree up 2x10   Bilat Blue    Shoulder flex and ABD to 90  2x10 Each   Full Range  2#  2x10 Each   Full Range  2#  2x10 Each   Full Range  2#  2x15 Each   Full Range  2# DC    press  2x10 5# Stick  2x10 5# Stick  2x10 5# Stick  3x10 5# Stick 2x10   6# Stick    Cones on shelf All 3 shelves  12 cones  All 3 shelves  12 cones  All 3 shelves  12 cones  All 3 shelves  12 cones  All 3 shelves   12 cones    TB Chest Flys      2x10   Blue    HEP update/review         Ther Activity                        Gait Training                        Modalities        CP to the left shoulder and UB in seated 15 min  15 min  15 min 15 mins 15 min

## 2021-09-13 ENCOUNTER — OFFICE VISIT (OUTPATIENT)
Dept: PHYSICAL THERAPY | Facility: CLINIC | Age: 66
End: 2021-09-13
Payer: COMMERCIAL

## 2021-09-13 DIAGNOSIS — M75.122 NONTRAUMATIC COMPLETE TEAR OF LEFT ROTATOR CUFF: Primary | ICD-10-CM

## 2021-09-13 DIAGNOSIS — Z98.890 S/P SHOULDER SURGERY: ICD-10-CM

## 2021-09-13 PROCEDURE — 97110 THERAPEUTIC EXERCISES: CPT | Performed by: PHYSICAL THERAPIST

## 2021-09-13 PROCEDURE — 97140 MANUAL THERAPY 1/> REGIONS: CPT | Performed by: PHYSICAL THERAPIST

## 2021-09-13 PROCEDURE — 97112 NEUROMUSCULAR REEDUCATION: CPT | Performed by: PHYSICAL THERAPIST

## 2021-09-13 NOTE — PROGRESS NOTES
Daily Note     Today's date: 2021  Patient name: Magdalena Hazel  : 1955  MRN: [id number]  Referring provider: Patsey Opitz  Dx:   Encounter Diagnosis     ICD-10-CM    1  Nontraumatic complete tear of left rotator cuff  M75 122    2  S/P shoulder surgery  Z98 890                   Subjective:  Patient reports he was working on his RV over the weekend with soreness in the left shoulder of 3/10 level  Objective: See treatment diary below      Assessment: Tolerated treatment well  PT notes continuation of progression of POC with focus on scapular stabilization and manual therapy to decrease pain levels and improve functional limitations to meet therapy goals  PT notes continuation of decrease ROM and strength t/o the left shoulder and scapula with need for continuation of skilled therapy  Plan: Continue per plan of care        Precautions:  PMH skin cancer and MI with cardiac stent        Manuals    Left shoulder and scapula mobs and stretching  15 min 15 min 15 mins 15 min  15 min                            Neuro Re-Ed        Bent over row         Scapular pinch         Shoulder Flexion Wall Slides  IYT   2x10 Each  IYT   2x10 Each  IYT   2x10 Each  IYT  2x10 Each  IYT  15x Each    TB IR         Wall push-up         Supine stick press and flex         S/L scapular 4 way  2x10 Each   1#  2x10 Each   1 5#  2x10 Each   1 5# 2x10 Each   1 5#  2x10 Each   2 0#    TB ER  2x10 Each   ER and Horizontal ABD   Red 2x10 Each   ER and Horizontal ABD   Red 2x10 Each   ER and Horizontal ABD   Red 2x10 Each   Red  2x10 Each   Green                    Ther Ex        Table slides-flex and scap        Table circles         Shoulder shrug        UBE  10 min   80 resist   Alt  10 min   80 resist   Alt  10 min   80 resist   Alt  10 min   80 resist  Alt  10 min   80 resist  Alt    TB MTP and LTP  2x15 Each   Blue 2x15 Each   Blue 2x15 Each   Blue 2x15 Each   Blue  2x15 Each Blue    [employer] of [employer] Carry     5# 60 Feet Carry   3# 60 Feet Carry  3# 60 Feet Carry  5# 60 Feet   Carry    Pendulums-All Directions         Wrist rolls         Cane shoulder flex and ER         Pulleys Flex         Supine press and flex with cane         TB Punch  45 degree up 15x Bilat Blue 45 degree up 2x10 Bilat Blue 45 degree up 2x10 Bilat Blue 45 Degree up 2x10   Bilat Blue  DC   Shoulder flex and ABD to 90  2x10 Each   Full Range  2#  2x10 Each   Full Range  2#  2x15 Each   Full Range  2# DC DC    press  2x10 5# Stick  2x10 5# Stick  3x10 5# Stick 2x10   6# Stick  2x10   7# Stick    Cones on shelf All 3 shelves  12 cones  All 3 shelves  12 cones  All 3 shelves  12 cones  All 3 shelves   12 cones  All 3 shelves   12 cones   2X   TB Chest Flys     2x10   Blue  HEP    HEP update/review         Ther Activity     9/13                   Gait Training     9/13                   Modalities     9/13   CP to the left shoulder and UB in seated 15 min  15 min 15 mins 15 min  15 min

## 2021-09-16 ENCOUNTER — OFFICE VISIT (OUTPATIENT)
Dept: PHYSICAL THERAPY | Facility: CLINIC | Age: 66
End: 2021-09-16
Payer: COMMERCIAL

## 2021-09-16 DIAGNOSIS — M75.122 NONTRAUMATIC COMPLETE TEAR OF LEFT ROTATOR CUFF: Primary | ICD-10-CM

## 2021-09-16 DIAGNOSIS — Z98.890 S/P SHOULDER SURGERY: ICD-10-CM

## 2021-09-16 PROCEDURE — 97140 MANUAL THERAPY 1/> REGIONS: CPT

## 2021-09-16 PROCEDURE — 97110 THERAPEUTIC EXERCISES: CPT

## 2021-09-16 PROCEDURE — 97112 NEUROMUSCULAR REEDUCATION: CPT

## 2021-09-16 NOTE — PROGRESS NOTES
Daily Note     Today's date: 2021  Patient name: Marychuy Garcia  : 1955  MRN: [id number]  Referring provider: Laureano Wallace PA-C  Dx: No diagnosis found  Subjective: Patient voices that his L upper trapezius has been bothering him  Objective: See treatment diary below      Assessment: Tolerated treatment well  Patient exhibited good technique with therapeutic exercises and would benefit from continued PT to increase L shoulder ROM/strength and endurance to improve mobility  Increased tightness/spasm palpated in the L scapular region and the L upper trapezius region  Decrease in spasm/tightness palpated in the noted regions post treatment  Plan: Continue per plan of care  Progress treatment as tolerated         Precautions:  PMH skin cancer and MI with cardiac stent        Manuals    Left shoulder and scapula mobs and stretching  15 min 15 mins 15 min  15 min  15'                           Neuro Re-Ed       Bent over row         Scapular pinch      2x10 5"hold   Shoulder Flexion Wall Slides  IYT   2x10 Each  IYT   2x10 Each  IYT  2x10 Each  IYT  15x Each  IYT 15x ea   TB IR         Wall push-up Plus     10x5"   Supine stick press and flex      7# 10x    S/L scapular 4 way  2x10 Each   1 5#  2x10 Each   1 5# 2x10 Each   1 5#  2x10 Each   2 0#  2# 2x10    TB ER  2x10 Each   ER and Horizontal ABD   Red 2x10 Each   ER and Horizontal ABD   Red 2x10 Each   Red  2x10 Each   Green  2x10 GREEN                   Ther Ex       Table slides-flex and scap        Table circles         Shoulder shrug        UBE  10 min   80 resist   Alt  10 min   80 resist   Alt  10 min   80 resist  Alt  10 min   80 resist  Alt  10' 80 resist alt   TB MTP and LTP  2x15 Each   Blue 2x15 Each   Blue 2x15 Each   Blue  2x15 Each   Blue  2x15 ea BLUE   Stat[place] of [place] Carry    5# 60 Feet Carry   3# 60 Feet Carry  3# 60 Feet Carry  5# 60 Feet   Carry  3# & 5# carry x 61' ea   Pendulums-All Directions         Wrist rolls         Cane shoulder flex and ER         Pulleys Flex         Supine press and flex with cane          press  2x10 5# Stick  3x10 5# Stick 2x10   6# Stick  2x10   7# Stick     Cones on shelf All 3 shelves  12 cones  All 3 shelves  12 cones  All 3 shelves   12 cones  All 3 shelves   12 cones   2X    HEP update/review         Ther Activity    9/13 9/16                   Gait Training    9/13 9/16                   Modalities    9/13 9/16   CP to the left shoulder and UB in seated 15 min 15 mins 15 min  15 min  15'

## 2021-09-20 ENCOUNTER — OFFICE VISIT (OUTPATIENT)
Dept: PHYSICAL THERAPY | Facility: CLINIC | Age: 66
End: 2021-09-20
Payer: COMMERCIAL

## 2021-09-20 DIAGNOSIS — Z98.890 S/P SHOULDER SURGERY: ICD-10-CM

## 2021-09-20 DIAGNOSIS — M75.122 NONTRAUMATIC COMPLETE TEAR OF LEFT ROTATOR CUFF: Primary | ICD-10-CM

## 2021-09-20 PROCEDURE — 97110 THERAPEUTIC EXERCISES: CPT

## 2021-09-20 PROCEDURE — 97112 NEUROMUSCULAR REEDUCATION: CPT

## 2021-09-20 PROCEDURE — 97140 MANUAL THERAPY 1/> REGIONS: CPT

## 2021-09-20 NOTE — PROGRESS NOTES
Daily Note     Today's date: 2021  Patient name: Tunde Campos  : 1955  MRN: [id number]  Referring provider: Silvestre Carlos  Dx:   Encounter Diagnosis     ICD-10-CM    1  Nontraumatic complete tear of left rotator cuff  M75 122    2  S/P shoulder surgery  Z98 890                   Subjective: "Sometimes when I'm sitting and relaxing I feel like my L shoulder is coming out of the socket "      Objective: See treatment diary below      Assessment: Tolerated treatment well  Patient exhibited good technique with therapeutic exercises and would benefit from continued PT to increase L shoulder ROM/strength and endurance to improve mobility  Plan: Continue per plan of care  Progress treatment as tolerated         Precautions:  PMH skin cancer and MI with cardiac stent        Manuals    Left shoulder and scapula mobs and stretching  15 mins 15 min  15 min  15' 15'                           Neuro Re-Ed      Bent over row         Scapular pinch     2x10 5"hold 2x10 5"hold   Shoulder Flexion Wall Slides  IYT   2x10 Each  IYT  2x10 Each  IYT  15x Each  IYT 15x ea IYT15x ea   TB IR         Wall push-up Plus    10x5"    Supine stick press and flex     7# 10x     S/L scapular 4 way  2x10 Each   1 5# 2x10 Each   1 5#  2x10 Each   2 0#  2# 2x10  2# 2x10   TB ER  2x10 Each   ER and Horizontal ABD   Red 2x10 Each   Red  2x10 Each   Green  2x10 GREEN 2x10 GREEN                   Ther Ex      Table slides-flex and scap        Table circles         Shoulder shrug        UBE  10 min   80 resist   Alt  10 min   80 resist  Alt  10 min   80 resist  Alt  10' 80 resist alt 10' 100resist alt   TB MTP and LTP  2x15 Each   Blue 2x15 Each   Blue  2x15 Each   Blue  2x15 ea BLUE 2x15 ea [place]   Stat[employer] of [place] Carry   5# 60 Feet Carry   3# 60 Feet Carry  3# 60 Feet Carry  5# 60 Feet   Carry  3# & 5# carry x 61' ea 3# & 5# carry x 60' ea   Pendulums-All Directions Wrist rolls         Cane shoulder flex and ER         Pulleys Flex         Supine press and flex with cane          press  3x10 5# Stick 2x10   6# Stick  2x10   7# Stick  5# 10x  5# 10x   Cones on shelf All 3 shelves  12 cones  All 3 shelves   12 cones  All 3 shelves   12 cones   2X All 3 shelves 12 cones  All 3 shelves 12cones x2   HEP update/review         Ther Activity   9/13 9/16 9/20                   Gait Training   9/13 9/16 9/20                   Modalities   9/13 9/16 9/20   CP to the left shoulder and UB in seated 15 mins 15 min  15 min  15' 15'

## 2021-09-23 ENCOUNTER — EVALUATION (OUTPATIENT)
Dept: PHYSICAL THERAPY | Facility: CLINIC | Age: 66
End: 2021-09-23
Payer: COMMERCIAL

## 2021-09-23 DIAGNOSIS — Z98.890 S/P SHOULDER SURGERY: ICD-10-CM

## 2021-09-23 DIAGNOSIS — M75.122 NONTRAUMATIC COMPLETE TEAR OF LEFT ROTATOR CUFF: Primary | ICD-10-CM

## 2021-09-23 PROCEDURE — 97140 MANUAL THERAPY 1/> REGIONS: CPT | Performed by: PHYSICAL THERAPIST

## 2021-09-23 PROCEDURE — 97110 THERAPEUTIC EXERCISES: CPT | Performed by: PHYSICAL THERAPIST

## 2021-09-23 PROCEDURE — 97112 NEUROMUSCULAR REEDUCATION: CPT | Performed by: PHYSICAL THERAPIST

## 2021-09-23 NOTE — PROGRESS NOTES
PT Re-Evaluation     Today's date: 2021  Patient name: Marilynn Booker  : 1955  MRN: [id number]  Referring provider: Kyleigh Leslie  Dx:   Encounter Diagnosis     ICD-10-CM    1  Nontraumatic complete tear of left rotator cuff  M75 122    2  S/P shoulder surgery  Z98 890                   Assessment  Assessment details: PT notes the patient with continuation of decrease ROM and strength t/o the left shoulder and scapula with need for continuation of skilled therapy for 4 weeks with focus on scapular stabilization, posture, manual therapy, analgesic modalities, and update/review of HEP  Impairments: abnormal or restricted ROM, activity intolerance, impaired physical strength, lacks appropriate home exercise program, pain with function, scapular dyskinesis and poor posture   Understanding of Dx/Px/POC: good   Prognosis: good    Goals  ST  Initiate HEP-MET  2  Decrease pain levels by 25-50%-MET   3  Increase ROM by 25-50%-MET  4  Increase strength by 1-2 mm grades-MET  LT  Improve postural awareness-Partial MET  2  Improve scapular stability-Partial MET  3  Decrease limitations with reaching, carrying, and lifting-Partial MET  4  Decrease limitations with OH and push/pull activities-Partial MET  5  Decrease limitations with ADL-Partial MET  6  RTE partial/full Duty-Partial MET   7   DC with HEP-Progressing     Plan  Plan details: PT notes review of POC and findings with patient who is in agreement with PT recommendations of continuation of skilled therapy      Patient would benefit from: skilled physical therapy  Planned modality interventions: cryotherapy  Planned therapy interventions: manual therapy, neuromuscular re-education, patient education, self care, strengthening, stretching, therapeutic exercise, home exercise program, graded exercise and flexibility  Frequency: 2x week  Duration in weeks: 4  Treatment plan discussed with: patient        Subjective Evaluation    History of Present Illness  Date of surgery: 2021  Mechanism of injury: surgery  Mechanism of injury: Patient reports development of left shoulder pain about 6 months ago from insidious onset with progressive worsening over time leading to limitations with left shoulder movement and increase pain levels  Patient went to ortho MD for evaluation and was treated with analgesic injection and course of OPPT with minimal to no change in status so patient was evaluated by x-ray with findings with complete RC tear with need for surgical repair  Patient underwent the procedure on 21 and is now p/o with orders for OPPT  Patient reports he is presently OOW as  at [facility] with no set RTW date  Patient reports significant PMH of MI with cardiac stent, HBP, DM, GERD, macular degeneration and skin cancer  Presently the patient has attended 28 sessions of skilled therapy and feels 70-75% improvement since the start of therapy with some decrease in pain levels but continuation of weakness with limited movement of the left shoulder and UE  Patient feels he requires more therapy to continue to get stronger and more movement in the left shoulder so he can RTW full duty and return to pre-injury status  Patient reports he is RTW on 21 with restricted status of 5# weight restriction      Pain  Current pain ratin  At best pain ratin  At worst pain rating: 3  Location: Left Shoulder   Quality: sharp and radiating  Relieving factors: rest and medications  Aggravating factors: lifting and overhead activity  Progression: improved      Diagnostic Tests  MRI studies: abnormal  Treatments  Previous treatment: physical therapy, medication and injection treatment  Current treatment: immobilization and medication  Patient Goals  Patient goals for therapy: decreased pain, increased motion, increased strength, independence with ADLs/IADLs and return to work          Objective     Postural Observations  Seated posture: fair  Standing posture: fair    Additional Postural Observation Details  PT notes bilateral rounded shoulders, forward head and bilateral protracted scapula     Tenderness     Left Shoulder   No tenderness     Neurological Testing     Reflexes   Left   Biceps (C5/C6): normal (2+)  Brachioradialis (C6): normal (2+)    Right   Biceps (C5/C6): normal (2+)  Brachioradialis (C6): normal (2+)    Active Range of Motion   Cervical/Thoracic Spine       Cervical    Flexion:  WFL  Extension:  WFL  Left lateral flexion:  WFL  Right lateral flexion:  WFL  Left rotation:  WFL  Right rotation:  WFL  Left Shoulder   Flexion: 144 degrees with pain  Extension: WFL  Abduction: 126 degrees   External rotation 90°: 51 degrees with pain  Internal rotation 90°: 57 degrees     Left Elbow   Normal active range of motion    Passive Range of Motion   Left Shoulder   Flexion: 160 degrees with pain  Extension: WFL  Abduction: 159 degrees with pain  External rotation 90°: 57 degrees with pain  Internal rotation 90°: 63 degrees     Additional Passive Range of Motion Details  PT notes continuation of decrease ROM and strength t/o the left shoulder and scapula     Scapular Mobility   Left Shoulder   Scapular Dyskinesis: grade I  Scapular mobility: good    Strength/Myotome Testing     Left Shoulder     Planes of Motion   Flexion: 4-   Extension: 4   Abduction: 3+   Adduction: 4   External rotation at 0°: 3+   External rotation at 90°: 3   Internal rotation at 0°: 4   Internal rotation at 90°: 4     Left Elbow   Flexion: 4+  Extension: 4+             Precautions:  PMH skin cancer and MI with cardiac stent     Manuals 9/9 9/13 9/16 9/20 9/23   Left shoulder and scapula mobs and stretching  15 min  15 min  15' 15' 15 min                            Neuro Re-Ed  9/13 9/16 9/20    Bent over row         Scapular pinch    2x10 5"hold 2x10 5"hold DC   Shoulder Flexion Wall Slides  IYT  2x10 Each  IYT  15x Each  IYT 15x ea IYT15x ea 2x10 Each   Each    TB IR         Wall push-up Plus   10x5"     Supine stick press and flex    7# 10x      S/L scapular 4 way  2x10 Each   1 5#  2x10 Each   2 0#  2# 2x10  2# 2x10 2 5#  2x10 Each    TB ER  2x10 Each   Red  2x10 Each   Green  2x10 GREEN 2x10 GREEN TB Bilateral ER and horizontal ABD   15x Green    OH TB Press with Flex/Ext      10x5" Hold   Each Red           Ther Ex  9/13 9/16 9/20    Table slides-flex and scap        Table circles         Shoulder shrug        UBE  10 min   80 resist  Alt  10 min   80 resist  Alt  10' 80 resist alt 10' 100resist alt 10 min   90 resist  Alt    TB MTP and LTP  2x15 Each   Blue  2x15 Each   Blue  2x15 ea BLUE 2x15 ea BLUE 2x15 each   Blue    Stat[address] of [place]  5# 60 Feet Carry   3# 60 Feet Carry  3# 60 Feet Carry  5# [address]  3# & 5# carry x 60' ea 3# & 5# carry x 60' ea 3# 2x60 Feet  5# 60 Feet  Carry    Pendulums-All Directions         Wrist rolls         Cane shoulder flex and ER         Pulleys Flex         Supine press and flex with cane          press  2x10   6# Stick  2x10   7# Stick  5# 10x  5# 10x DC   Cones on shelf All 3 shelves   12 cones  All 3 shelves   12 cones   2X All 3 shelves 12 cones  All 3 shelves 12cones x2 NT   HEP update/review         Ther Activity  9/13 9/16 9/20                    Gait Training  9/13 9/16 9/20                    Modalities  9/13 9/16 9/20    CP to the left shoulder and UB in seated 15 min  15 min  15' 15' 15 min

## 2021-09-27 ENCOUNTER — OFFICE VISIT (OUTPATIENT)
Dept: PHYSICAL THERAPY | Facility: CLINIC | Age: 66
End: 2021-09-27
Payer: COMMERCIAL

## 2021-09-27 DIAGNOSIS — M75.122 NONTRAUMATIC COMPLETE TEAR OF LEFT ROTATOR CUFF: Primary | ICD-10-CM

## 2021-09-27 DIAGNOSIS — Z98.890 S/P SHOULDER SURGERY: ICD-10-CM

## 2021-09-27 PROCEDURE — 97140 MANUAL THERAPY 1/> REGIONS: CPT

## 2021-09-27 PROCEDURE — 97112 NEUROMUSCULAR REEDUCATION: CPT

## 2021-09-27 PROCEDURE — 97110 THERAPEUTIC EXERCISES: CPT

## 2021-09-27 NOTE — PROGRESS NOTES
Daily Note     Today's date: 2021  Patient name: Savage Hernandez  : 1955  MRN: [id number]  Referring provider: Felice Hansen  Dx:   Encounter Diagnosis     ICD-10-CM    1  Nontraumatic complete tear of left rotator cuff  M75 122    2  S/P shoulder surgery  Z98 890                   Subjective: No new c/o pain today  Objective: See treatment diary below      Assessment: Tolerated treatment well  Patient exhibited good technique with therapeutic exercises and would benefit from continued PT to increase L shoulder ROM/strength and endurance to improve mobility  Plan: Continue per plan of care  Progress treatment as tolerated         Precautions:  PMH skin cancer and MI with cardiac stent     Manuals    Left shoulder and scapula mobs and stretching  15 min  15' 15' 15 min  15'                           Neuro Re-Ed    Bent over row         Scapular pinch   2x10 5"hold 2x10 5"hold DC    Shoulder Flexion Wall Slides  IYT  15x Each  IYT 15x ea IYT15x ea 2x10 Each   Each  2x10    TB IR         Wall push-up Plus  10x5"   10x5"   Supine stick press and flex   7# 10x       S/L scapular 4 way  2x10 Each   2 0#  2# 2x10  2# 2x10 2 5#  2x10 Each  2 5#   2x10 ea   TB ER  2x10 Each   Green  2x10 GREEN 2x10 GREEN TB Bilateral ER and horizontal ABD   15x Green  & horizontal ABD   15x ea GREEN   OH TB Press with Flex/Ext     10x5" Hold   Each Red 10x5" ea RED           Ther Ex    Table slides-flex and scap        Table circles         Shoulder shrug        UBE  10 min   80 resist  Alt  10' 80 resist alt 10' 100resist alt 10 min   90 resist  Alt  10' 100resist alt   TB MTP and LTP  2x15 Each   Blue  2x15 ea BLUE 2x15 ea BLUE 2x15 each   Blue  2x15 ea BLUE   Statue of [place]  3# 60 Feet Carry  5# 60 Feet   Carry  3# & 5# carry x 61' ea 3# & 5# carry x 61' ea 3# 2x60 Feet  5# 60 Feet  Carry  3# & 5# 2x60' ea   Pendulums-All Directions Wrist rolls         Cane shoulder flex and ER         Pulleys Flex         Supine press and flex with cane          press  2x10   7# Stick  5# 10x  5# 10x DC    Cones on shelf All 3 shelves   12 cones   2X All 3 shelves 12 cones  All 3 shelves 12cones x2 NT    HEP update/review         Ther Activity 9/13 9/16 9/20 9/27                   Gait Training 9/13 9/16 9/20 9/27                   Modalities 9/13 9/16 9/20 9/27   CP to the left shoulder and UB in seated 15 min  15' 15' 15 min  15'

## 2021-09-29 ENCOUNTER — OFFICE VISIT (OUTPATIENT)
Dept: OBGYN CLINIC | Facility: CLINIC | Age: 66
End: 2021-09-29
Payer: COMMERCIAL

## 2021-09-29 VITALS
HEIGHT: 67 IN | TEMPERATURE: 97.8 F | HEART RATE: 79 BPM | WEIGHT: 204 LBS | DIASTOLIC BLOOD PRESSURE: 82 MMHG | SYSTOLIC BLOOD PRESSURE: 142 MMHG | BODY MASS INDEX: 32.02 KG/M2

## 2021-09-29 DIAGNOSIS — M75.122 NONTRAUMATIC COMPLETE TEAR OF LEFT ROTATOR CUFF: Primary | ICD-10-CM

## 2021-09-29 PROCEDURE — 3079F DIAST BP 80-89 MM HG: CPT | Performed by: ORTHOPAEDIC SURGERY

## 2021-09-29 PROCEDURE — 1036F TOBACCO NON-USER: CPT | Performed by: ORTHOPAEDIC SURGERY

## 2021-09-29 PROCEDURE — 99213 OFFICE O/P EST LOW 20 MIN: CPT | Performed by: ORTHOPAEDIC SURGERY

## 2021-09-29 PROCEDURE — 3008F BODY MASS INDEX DOCD: CPT | Performed by: NURSE PRACTITIONER

## 2021-09-29 PROCEDURE — 3008F BODY MASS INDEX DOCD: CPT | Performed by: ORTHOPAEDIC SURGERY

## 2021-09-29 PROCEDURE — 3077F SYST BP >= 140 MM HG: CPT | Performed by: ORTHOPAEDIC SURGERY

## 2021-09-29 NOTE — LETTER
September 29, 2021     Patient: Melanie Conde   YOB: 1955   Date of Visit: 9/29/2021       To Whom it May Concern:    Melanie Conde is under my professional care  He was seen in my office on 9/29/2021  He may return to work on 09/30/2021  He is permitted to lift up to 5 lb with the left upper extremity  He is allowed full use of the right upper extremity       If you have any questions or concerns, please don't hesitate to call           Sincerely,          Nella Packer        CC: No Recipients

## 2021-09-29 NOTE — PROGRESS NOTES
Patient Name:  Narinder Mendez  MRN:  [id number]    Assessment     1  Nontraumatic complete tear of left rotator cuff         Plan     1  Discussed with patient that he is progressing well postoperatively   2  Continue NSAIDs/Tylenol as needed for pain and soreness   3  Continue formal physical therapy as per protocol, continue HEP as directed by therapist  4  A note was provided for continuing limited duty at work    Return in about 5 weeks (around 11/3/2021)  Subjective   Narinder Mendez returns for follow-up of left shoulder arthroscopy performed 5/6/2021  The patient is 4 months in 23 days post op and returns for routine follow-up  Patient reports he continues to make weekly progress with formal physical therapy  He states that he sometimes gets a sensation of joint laxity in the left shoulder when he is sitting using an arm rest   Otherwise he denies any pain, bruising, swelling, numbness, or tingling  He is not currently taking any medications for pain        Objective     /82   Pulse 79   Temp 97 8 °F (36 6 °C)   Ht 5' 7" (1 702 m)   Wt 92 5 kg (204 lb)   BMI 31 95 kg/m²     Left shoulder -   Incisions:  Clean, dry, well healed   Skin is warm and dry to touch with no signs of erythema, ecchymosis, infection  ROM: FF 0°-160°, ABD 0°-160°, ER 0°-70°  Strength:  5-/5 MMT rotator cuff  2+ distal radial pulse with brisk capillary refill to the fingers  Radial, median, and ulnar motor and sensory distributions intact  Sensation light touch intact distally      Data Review     No new imaging reviewed this visit    Scribe Attestation    I,:  Chidi Martinez am acting as a scribe while in the presence of the attending physician :       I,:  Dayo He personally performed the services described in this documentation    as scribed in my presence :

## 2021-09-30 ENCOUNTER — OFFICE VISIT (OUTPATIENT)
Dept: PHYSICAL THERAPY | Facility: CLINIC | Age: 66
End: 2021-09-30
Payer: COMMERCIAL

## 2021-09-30 DIAGNOSIS — Z98.890 S/P SHOULDER SURGERY: ICD-10-CM

## 2021-09-30 DIAGNOSIS — M75.122 NONTRAUMATIC COMPLETE TEAR OF LEFT ROTATOR CUFF: Primary | ICD-10-CM

## 2021-09-30 PROCEDURE — 97110 THERAPEUTIC EXERCISES: CPT

## 2021-09-30 PROCEDURE — 97140 MANUAL THERAPY 1/> REGIONS: CPT | Performed by: PHYSICAL THERAPIST

## 2021-09-30 PROCEDURE — 97112 NEUROMUSCULAR REEDUCATION: CPT

## 2021-09-30 NOTE — PROGRESS NOTES
Daily Note     Today's date: 2021  Patient name: Marychuy Garcia  : 1955  MRN: [id number]  Referring provider: Jayna Coleman  Dx:   Encounter Diagnosis     ICD-10-CM    1  Nontraumatic complete tear of left rotator cuff  M75 122    2  S/P shoulder surgery  Z98 890                   Subjective: "I'm tired today "      Objective: See treatment diary below      Assessment: Tolerated treatment well  Patient exhibited good technique with therapeutic exercises and would benefit from continued PT to increase L shoulder ROM/strength and endurance to improve mobility  Plan: Continue per plan of care  Progress treatment as tolerated         Precautions:  PMH skin cancer and MI with cardiac stent     Manuals    Left shoulder and scapula mobs and stretching  15' 15' 15 min  15' 15'BM                           Neuro Re-Ed    Bent over row         Shoulder Flexion Wall Slides  IYT 15x ea IYT15x ea 2x10 Each   Each  2x10  2x10   TB IR         Wall push-up Plus 10x5"   10x5"    Supine stick press and flex  7# 10x        S/L scapular 4 way  2# 2x10  2# 2x10 2 5#  2x10 Each  2 5#   2x10 ea 2 5#   2x10 ea   TB ER  2x10 GREEN 2x10 GREEN TB Bilateral ER and horizontal ABD   15x Green  & horizontal ABD   15x ea GREEN & horizontal ABD   15x ea GREEN   OH TB Press with Flex/Ext    10x5" Hold   Each Red 10x5" ea RED 10x5" RED           Ther Ex    Table slides-flex and scap        Table circles         Shoulder shrug        UBE  10' 80 resist alt 10' 100resist alt 10 min   90 resist  Alt  10' 100resist alt 10' 100resist alt   TB MTP and LTP  2x15 ea BLUE 2x15 ea BLUE 2x15 each   Blue  2x15 ea BLUE 2x15ea BLUE   Stat[address] of [place]  3# & 5# carry x 61' ea 3# & 5# carry x 61' ea 3# 2x60 Feet  5# 60 Feet  [place]  3# & 5# 2x60' ea 3# & 5# 2x60' ea   Pendulums-All Directions         Wrist rolls         Cane shoulder flex and ER         Pulleys Flex Supine press and flex with cane         Cones on shelf All 3 shelves 12 cones  All 3 shelves 12cones x2 NT  All 3 shelves 12cones 2x   HEP update/review         Ther Activity 9/16 9/20 9/27 9/30                   Gait Training 9/16 9/20 9/27 9/30                   Modalities 9/16 9/20 9/27 9/30   CP to the left shoulder and UB in seated 15' 15' 15 min  15' 15'

## 2021-10-04 ENCOUNTER — OFFICE VISIT (OUTPATIENT)
Dept: PHYSICAL THERAPY | Facility: CLINIC | Age: 66
End: 2021-10-04
Payer: COMMERCIAL

## 2021-10-04 DIAGNOSIS — Z98.890 S/P SHOULDER SURGERY: ICD-10-CM

## 2021-10-04 DIAGNOSIS — M75.122 NONTRAUMATIC COMPLETE TEAR OF LEFT ROTATOR CUFF: Primary | ICD-10-CM

## 2021-10-04 PROCEDURE — 97110 THERAPEUTIC EXERCISES: CPT

## 2021-10-04 PROCEDURE — 97140 MANUAL THERAPY 1/> REGIONS: CPT

## 2021-10-04 PROCEDURE — 97112 NEUROMUSCULAR REEDUCATION: CPT

## 2021-10-07 ENCOUNTER — OFFICE VISIT (OUTPATIENT)
Dept: PHYSICAL THERAPY | Facility: CLINIC | Age: 66
End: 2021-10-07
Payer: COMMERCIAL

## 2021-10-07 DIAGNOSIS — Z98.890 S/P SHOULDER SURGERY: ICD-10-CM

## 2021-10-07 DIAGNOSIS — M75.122 NONTRAUMATIC COMPLETE TEAR OF LEFT ROTATOR CUFF: Primary | ICD-10-CM

## 2021-10-07 PROCEDURE — 97110 THERAPEUTIC EXERCISES: CPT | Performed by: PHYSICAL THERAPIST

## 2021-10-07 PROCEDURE — 97140 MANUAL THERAPY 1/> REGIONS: CPT | Performed by: PHYSICAL THERAPIST

## 2021-10-07 PROCEDURE — 97112 NEUROMUSCULAR REEDUCATION: CPT | Performed by: PHYSICAL THERAPIST

## 2021-10-11 ENCOUNTER — OFFICE VISIT (OUTPATIENT)
Dept: PHYSICAL THERAPY | Facility: CLINIC | Age: 66
End: 2021-10-11
Payer: COMMERCIAL

## 2021-10-11 DIAGNOSIS — Z98.890 S/P SHOULDER SURGERY: ICD-10-CM

## 2021-10-11 DIAGNOSIS — M75.122 NONTRAUMATIC COMPLETE TEAR OF LEFT ROTATOR CUFF: Primary | ICD-10-CM

## 2021-10-11 PROCEDURE — 97110 THERAPEUTIC EXERCISES: CPT | Performed by: PHYSICAL THERAPIST

## 2021-10-11 PROCEDURE — 97140 MANUAL THERAPY 1/> REGIONS: CPT | Performed by: PHYSICAL THERAPIST

## 2021-10-11 PROCEDURE — 97112 NEUROMUSCULAR REEDUCATION: CPT | Performed by: PHYSICAL THERAPIST

## 2021-10-14 ENCOUNTER — OFFICE VISIT (OUTPATIENT)
Dept: PHYSICAL THERAPY | Facility: CLINIC | Age: 66
End: 2021-10-14
Payer: COMMERCIAL

## 2021-10-14 DIAGNOSIS — Z98.890 S/P SHOULDER SURGERY: ICD-10-CM

## 2021-10-14 DIAGNOSIS — M75.122 NONTRAUMATIC COMPLETE TEAR OF LEFT ROTATOR CUFF: Primary | ICD-10-CM

## 2021-10-14 PROCEDURE — 97110 THERAPEUTIC EXERCISES: CPT | Performed by: PHYSICAL THERAPIST

## 2021-10-14 PROCEDURE — 97140 MANUAL THERAPY 1/> REGIONS: CPT | Performed by: PHYSICAL THERAPIST

## 2021-10-14 PROCEDURE — 97112 NEUROMUSCULAR REEDUCATION: CPT | Performed by: PHYSICAL THERAPIST

## 2021-10-18 ENCOUNTER — EVALUATION (OUTPATIENT)
Dept: PHYSICAL THERAPY | Facility: CLINIC | Age: 66
End: 2021-10-18
Payer: COMMERCIAL

## 2021-10-18 DIAGNOSIS — E11.9 TYPE 2 DIABETES MELLITUS WITHOUT COMPLICATION, WITHOUT LONG-TERM CURRENT USE OF INSULIN (HCC): Primary | ICD-10-CM

## 2021-10-18 DIAGNOSIS — Z98.890 S/P SHOULDER SURGERY: ICD-10-CM

## 2021-10-18 DIAGNOSIS — M75.122 NONTRAUMATIC COMPLETE TEAR OF LEFT ROTATOR CUFF: Primary | ICD-10-CM

## 2021-10-18 PROCEDURE — 97110 THERAPEUTIC EXERCISES: CPT | Performed by: PHYSICAL THERAPIST

## 2021-10-18 PROCEDURE — 97140 MANUAL THERAPY 1/> REGIONS: CPT | Performed by: PHYSICAL THERAPIST

## 2021-10-18 PROCEDURE — 97112 NEUROMUSCULAR REEDUCATION: CPT | Performed by: PHYSICAL THERAPIST

## 2021-10-21 ENCOUNTER — OFFICE VISIT (OUTPATIENT)
Dept: PHYSICAL THERAPY | Facility: CLINIC | Age: 66
End: 2021-10-21
Payer: COMMERCIAL

## 2021-10-21 DIAGNOSIS — Z98.890 S/P SHOULDER SURGERY: ICD-10-CM

## 2021-10-21 DIAGNOSIS — M75.122 NONTRAUMATIC COMPLETE TEAR OF LEFT ROTATOR CUFF: Primary | ICD-10-CM

## 2021-10-21 PROCEDURE — 97112 NEUROMUSCULAR REEDUCATION: CPT

## 2021-10-21 PROCEDURE — 97140 MANUAL THERAPY 1/> REGIONS: CPT

## 2021-10-21 PROCEDURE — 97110 THERAPEUTIC EXERCISES: CPT

## 2021-10-25 ENCOUNTER — OFFICE VISIT (OUTPATIENT)
Dept: PHYSICAL THERAPY | Facility: CLINIC | Age: 66
End: 2021-10-25
Payer: COMMERCIAL

## 2021-10-25 DIAGNOSIS — Z98.890 S/P SHOULDER SURGERY: ICD-10-CM

## 2021-10-25 DIAGNOSIS — M75.122 NONTRAUMATIC COMPLETE TEAR OF LEFT ROTATOR CUFF: Primary | ICD-10-CM

## 2021-10-25 PROCEDURE — 97140 MANUAL THERAPY 1/> REGIONS: CPT

## 2021-10-25 PROCEDURE — 97112 NEUROMUSCULAR REEDUCATION: CPT | Performed by: PHYSICAL THERAPIST

## 2021-10-25 PROCEDURE — 97110 THERAPEUTIC EXERCISES: CPT

## 2021-10-28 ENCOUNTER — OFFICE VISIT (OUTPATIENT)
Dept: PHYSICAL THERAPY | Facility: CLINIC | Age: 66
End: 2021-10-28
Payer: COMMERCIAL

## 2021-10-28 DIAGNOSIS — M75.122 NONTRAUMATIC COMPLETE TEAR OF LEFT ROTATOR CUFF: Primary | ICD-10-CM

## 2021-10-28 DIAGNOSIS — Z98.890 S/P SHOULDER SURGERY: ICD-10-CM

## 2021-10-28 PROCEDURE — 97140 MANUAL THERAPY 1/> REGIONS: CPT | Performed by: PHYSICAL THERAPIST

## 2021-10-28 PROCEDURE — 97110 THERAPEUTIC EXERCISES: CPT | Performed by: PHYSICAL THERAPIST

## 2021-10-28 PROCEDURE — 97535 SELF CARE MNGMENT TRAINING: CPT | Performed by: PHYSICAL THERAPIST

## 2021-11-03 ENCOUNTER — OFFICE VISIT (OUTPATIENT)
Dept: OBGYN CLINIC | Facility: CLINIC | Age: 66
End: 2021-11-03
Payer: COMMERCIAL

## 2021-11-03 VITALS
BODY MASS INDEX: 32.02 KG/M2 | WEIGHT: 204 LBS | SYSTOLIC BLOOD PRESSURE: 118 MMHG | DIASTOLIC BLOOD PRESSURE: 76 MMHG | HEART RATE: 76 BPM | TEMPERATURE: 96.6 F | HEIGHT: 67 IN

## 2021-11-03 DIAGNOSIS — M75.122 NONTRAUMATIC COMPLETE TEAR OF LEFT ROTATOR CUFF: Primary | ICD-10-CM

## 2021-11-03 PROCEDURE — 99213 OFFICE O/P EST LOW 20 MIN: CPT | Performed by: ORTHOPAEDIC SURGERY

## 2021-11-03 PROCEDURE — 3074F SYST BP LT 130 MM HG: CPT | Performed by: ORTHOPAEDIC SURGERY

## 2021-11-03 PROCEDURE — 3078F DIAST BP <80 MM HG: CPT | Performed by: ORTHOPAEDIC SURGERY

## 2021-11-03 PROCEDURE — 1036F TOBACCO NON-USER: CPT | Performed by: ORTHOPAEDIC SURGERY

## 2021-11-15 ENCOUNTER — OFFICE VISIT (OUTPATIENT)
Dept: FAMILY MEDICINE CLINIC | Facility: CLINIC | Age: 66
End: 2021-11-15
Payer: COMMERCIAL

## 2021-11-15 VITALS
BODY MASS INDEX: 32.93 KG/M2 | WEIGHT: 209.8 LBS | HEIGHT: 67 IN | HEART RATE: 92 BPM | SYSTOLIC BLOOD PRESSURE: 130 MMHG | OXYGEN SATURATION: 97 % | DIASTOLIC BLOOD PRESSURE: 80 MMHG | TEMPERATURE: 97.5 F

## 2021-11-15 DIAGNOSIS — Z23 NEEDS FLU SHOT: ICD-10-CM

## 2021-11-15 DIAGNOSIS — K21.9 GASTROESOPHAGEAL REFLUX DISEASE WITHOUT ESOPHAGITIS: Primary | ICD-10-CM

## 2021-11-15 DIAGNOSIS — M75.82 TENDINITIS OF LEFT ROTATOR CUFF: ICD-10-CM

## 2021-11-15 DIAGNOSIS — M75.122 NONTRAUMATIC COMPLETE TEAR OF LEFT ROTATOR CUFF: ICD-10-CM

## 2021-11-15 DIAGNOSIS — E78.00 HIGH CHOLESTEROL: ICD-10-CM

## 2021-11-15 DIAGNOSIS — E11.9 TYPE 2 DIABETES MELLITUS WITHOUT COMPLICATION, WITHOUT LONG-TERM CURRENT USE OF INSULIN (HCC): ICD-10-CM

## 2021-11-15 DIAGNOSIS — I10 PRIMARY HYPERTENSION: ICD-10-CM

## 2021-11-15 LAB — SL AMB POCT HEMOGLOBIN AIC: 7.6 (ref ?–6.5)

## 2021-11-15 PROCEDURE — 3008F BODY MASS INDEX DOCD: CPT | Performed by: NURSE PRACTITIONER

## 2021-11-15 PROCEDURE — 83036 HEMOGLOBIN GLYCOSYLATED A1C: CPT | Performed by: NURSE PRACTITIONER

## 2021-11-15 PROCEDURE — 3051F HG A1C>EQUAL 7.0%<8.0%: CPT | Performed by: ORTHOPAEDIC SURGERY

## 2021-11-15 PROCEDURE — 3725F SCREEN DEPRESSION PERFORMED: CPT | Performed by: NURSE PRACTITIONER

## 2021-11-15 PROCEDURE — 99214 OFFICE O/P EST MOD 30 MIN: CPT | Performed by: NURSE PRACTITIONER

## 2021-11-15 PROCEDURE — 3051F HG A1C>EQUAL 7.0%<8.0%: CPT | Performed by: NURSE PRACTITIONER

## 2021-11-15 PROCEDURE — 3008F BODY MASS INDEX DOCD: CPT | Performed by: ORTHOPAEDIC SURGERY

## 2021-11-15 PROCEDURE — 1036F TOBACCO NON-USER: CPT | Performed by: NURSE PRACTITIONER

## 2021-11-15 RX ORDER — SEMAGLUTIDE 1.34 MG/ML
1 INJECTION, SOLUTION SUBCUTANEOUS WEEKLY
Qty: 12 ML | Refills: 0 | Status: SHIPPED | OUTPATIENT
Start: 2021-11-15 | End: 2021-11-15

## 2021-12-07 ENCOUNTER — IMMUNIZATIONS (OUTPATIENT)
Dept: FAMILY MEDICINE CLINIC | Facility: HOSPITAL | Age: 66
End: 2021-12-07

## 2021-12-07 DIAGNOSIS — Z23 ENCOUNTER FOR IMMUNIZATION: Primary | ICD-10-CM

## 2021-12-07 PROCEDURE — 91306 COVID-19 MODERNA VACC 0.25 ML BOOSTER: CPT

## 2021-12-07 PROCEDURE — 0064A COVID-19 MODERNA VACC 0.25 ML BOOSTER: CPT

## 2021-12-15 ENCOUNTER — OFFICE VISIT (OUTPATIENT)
Dept: OBGYN CLINIC | Facility: CLINIC | Age: 66
End: 2021-12-15
Payer: COMMERCIAL

## 2021-12-15 VITALS
SYSTOLIC BLOOD PRESSURE: 120 MMHG | BODY MASS INDEX: 32.8 KG/M2 | HEART RATE: 79 BPM | DIASTOLIC BLOOD PRESSURE: 72 MMHG | HEIGHT: 67 IN | TEMPERATURE: 97.6 F | WEIGHT: 209 LBS

## 2021-12-15 DIAGNOSIS — M75.122 NONTRAUMATIC COMPLETE TEAR OF LEFT ROTATOR CUFF: Primary | ICD-10-CM

## 2021-12-15 PROCEDURE — 1036F TOBACCO NON-USER: CPT | Performed by: ORTHOPAEDIC SURGERY

## 2021-12-15 PROCEDURE — 3074F SYST BP LT 130 MM HG: CPT | Performed by: ORTHOPAEDIC SURGERY

## 2021-12-15 PROCEDURE — 3008F BODY MASS INDEX DOCD: CPT | Performed by: ORTHOPAEDIC SURGERY

## 2021-12-15 PROCEDURE — 3078F DIAST BP <80 MM HG: CPT | Performed by: ORTHOPAEDIC SURGERY

## 2021-12-15 PROCEDURE — 99213 OFFICE O/P EST LOW 20 MIN: CPT | Performed by: ORTHOPAEDIC SURGERY

## 2021-12-15 PROCEDURE — 3008F BODY MASS INDEX DOCD: CPT | Performed by: NURSE PRACTITIONER

## 2022-02-16 ENCOUNTER — OFFICE VISIT (OUTPATIENT)
Dept: FAMILY MEDICINE CLINIC | Facility: CLINIC | Age: 67
End: 2022-02-16
Payer: COMMERCIAL

## 2022-02-16 VITALS
HEIGHT: 67 IN | WEIGHT: 211 LBS | DIASTOLIC BLOOD PRESSURE: 72 MMHG | OXYGEN SATURATION: 96 % | BODY MASS INDEX: 33.12 KG/M2 | TEMPERATURE: 98 F | HEART RATE: 88 BPM | SYSTOLIC BLOOD PRESSURE: 138 MMHG

## 2022-02-16 DIAGNOSIS — I10 WHITE COAT SYNDROME WITH DIAGNOSIS OF HYPERTENSION: ICD-10-CM

## 2022-02-16 DIAGNOSIS — I10 ESSENTIAL HYPERTENSION: ICD-10-CM

## 2022-02-16 DIAGNOSIS — K21.9 GASTROESOPHAGEAL REFLUX DISEASE WITHOUT ESOPHAGITIS: ICD-10-CM

## 2022-02-16 DIAGNOSIS — G89.29 CHRONIC LEFT SHOULDER PAIN: ICD-10-CM

## 2022-02-16 DIAGNOSIS — E78.00 HIGH CHOLESTEROL: Primary | ICD-10-CM

## 2022-02-16 DIAGNOSIS — M25.512 CHRONIC LEFT SHOULDER PAIN: ICD-10-CM

## 2022-02-16 DIAGNOSIS — F41.9 ANXIETY: ICD-10-CM

## 2022-02-16 DIAGNOSIS — I10 PRIMARY HYPERTENSION: ICD-10-CM

## 2022-02-16 DIAGNOSIS — E11.9 TYPE 2 DIABETES MELLITUS WITHOUT COMPLICATION, WITHOUT LONG-TERM CURRENT USE OF INSULIN (HCC): ICD-10-CM

## 2022-02-16 PROCEDURE — 3075F SYST BP GE 130 - 139MM HG: CPT | Performed by: NURSE PRACTITIONER

## 2022-02-16 PROCEDURE — 3008F BODY MASS INDEX DOCD: CPT | Performed by: NURSE PRACTITIONER

## 2022-02-16 PROCEDURE — 99214 OFFICE O/P EST MOD 30 MIN: CPT | Performed by: NURSE PRACTITIONER

## 2022-02-16 PROCEDURE — 1036F TOBACCO NON-USER: CPT | Performed by: NURSE PRACTITIONER

## 2022-02-16 PROCEDURE — 3078F DIAST BP <80 MM HG: CPT | Performed by: NURSE PRACTITIONER

## 2022-02-16 RX ORDER — CARVEDILOL 25 MG/1
25 TABLET ORAL 2 TIMES DAILY WITH MEALS
Qty: 90 TABLET | Refills: 1 | Status: SHIPPED | OUTPATIENT
Start: 2022-02-16 | End: 2022-05-12 | Stop reason: SDUPTHER

## 2022-02-16 RX ORDER — CITALOPRAM 10 MG/1
10 TABLET ORAL DAILY
Qty: 90 TABLET | Refills: 1 | Status: SHIPPED | OUTPATIENT
Start: 2022-02-16 | End: 2022-05-12 | Stop reason: SDUPTHER

## 2022-02-16 RX ORDER — DAPAGLIFLOZIN 10 MG/1
10 TABLET, FILM COATED ORAL DAILY
Qty: 90 TABLET | Refills: 1 | Status: SHIPPED | OUTPATIENT
Start: 2022-02-16 | End: 2022-05-07 | Stop reason: SDUPTHER

## 2022-02-16 RX ORDER — ATORVASTATIN CALCIUM 80 MG/1
80 TABLET, FILM COATED ORAL DAILY
Qty: 90 TABLET | Refills: 1 | Status: SHIPPED | OUTPATIENT
Start: 2022-02-16 | End: 2022-05-12 | Stop reason: SDUPTHER

## 2022-02-16 NOTE — PROGRESS NOTES
Assessment/Plan:       Diagnoses and all orders for this visit:    High cholesterol  -     atorvastatin (LIPITOR) 80 mg tablet; Take 1 tablet (80 mg total) by mouth daily  -     Lipid panel; Future    Type 2 diabetes mellitus without complication, without long-term current use of insulin (HCC)  -     Farxiga 10 MG TABS; Take 1 tablet (10 mg total) by mouth daily  -     Lipid panel; Future  -     Comprehensive metabolic panel; Future    Anxiety  -     citalopram (CeleXA) 10 mg tablet; Take 1 tablet (10 mg total) by mouth daily    Essential hypertension  -     carvedilol (COREG) 25 mg tablet; Take 1 tablet (25 mg total) by mouth 2 (two) times a day with meals  -     Comprehensive metabolic panel; Future    BMI 33 0-33 9,adult    Primary hypertension    Chronic left shoulder pain    Gastroesophageal reflux disease without esophagitis    White coat syndrome with diagnosis of hypertension      BP still within range and home BP with systolic from 384'K to 031'I and diastolic similar to office number  GERD controlled  Left shoulder pain is tolerable at this time - he is set to retire in the next month  HA1C was 7 7 which is acceptable  Refills sent to the pharmacy  Subjective:      Patient ID: Alley Pérez is a 61 y o  male  Here today for a follow-up  He is with a hx of T2DM, HTN  Hx of elevated BP when at the doctor's office  Takes BP at home a few weeks prior to his appt to track how it normally is  Continues on his diabetic medication - doing well on his Ozempic  The following portions of the patient's history were reviewed and updated as appropriate: allergies, current medications, past family history, past medical history, past social history, past surgical history and problem list     Review of Systems   Constitutional: Negative  Respiratory: Negative  Cardiovascular: Negative  Musculoskeletal:        Hx of left shoulder pain - controlled  Neurological: Negative      All other systems reviewed and are negative  Objective:      /72 (BP Location: Left arm, Patient Position: Sitting)   Pulse 88   Temp 98 °F (36 7 °C)   Ht 5' 7" (1 702 m)   Wt 95 7 kg (211 lb)   SpO2 96%   BMI 33 05 kg/m²          Physical Exam  Vitals and nursing note reviewed  Constitutional:       Appearance: Normal appearance  HENT:      Head: Normocephalic and atraumatic  Eyes:      Conjunctiva/sclera: Conjunctivae normal    Cardiovascular:      Rate and Rhythm: Normal rate and regular rhythm  Heart sounds: Normal heart sounds  No murmur heard  No gallop  Pulmonary:      Effort: Pulmonary effort is normal  No respiratory distress  Breath sounds: Normal breath sounds  No wheezing or rales  Abdominal:      General: Abdomen is flat  Musculoskeletal:      Cervical back: Neck supple  Neurological:      General: No focal deficit present  Mental Status: He is alert and oriented to person, place, and time  Mental status is at baseline  Cranial Nerves: No cranial nerve deficit  Psychiatric:         Mood and Affect: Mood normal          Behavior: Behavior normal          Thought Content:  Thought content normal          Judgment: Judgment normal

## 2022-02-16 NOTE — PATIENT INSTRUCTIONS
You may receive a survey in the mail, by text message, or by e-mail, please fill it out COMPLETELY, and let us know how we did! Please remember to sign up for your [employer] luisana to check you lab results, send us messages, and schedule appointments  If you have questions about the Webflakes option, please call us! Thank you again for choosing [facility]!

## 2022-03-24 DIAGNOSIS — I10 ESSENTIAL HYPERTENSION: ICD-10-CM

## 2022-03-24 RX ORDER — HYDROCHLOROTHIAZIDE 25 MG/1
25 TABLET ORAL DAILY
Qty: 90 TABLET | Refills: 0 | Status: SHIPPED | OUTPATIENT
Start: 2022-03-24 | End: 2022-05-12 | Stop reason: SDUPTHER

## 2022-04-15 NOTE — PROGRESS NOTES
Assessment/Plan:         Diagnoses and all orders for this visit:    Preoperative examination    Tendinitis of left rotator cuff    Chronic left shoulder pain    Type 2 diabetes mellitus without complication, without long-term current use of insulin ([facility] [address] )    Encounter for diabetic foot exam [facility])    Essential hypertension     Medically cleared for surgery  Discussed his lower BP's at home - they do run in the 100's/70's range  Will decrease his Ramipril to once a day re-evaluate at his next follow-up  BMI Counseling: Body mass index is 31 58 kg/m²  The BMI is above normal  Nutrition recommendations include encouraging healthy choices of fruits and vegetables and consuming healthier snacks  Subjective:      Patient ID: Levy Sr is a 61 y o  male  Here today as a new patient for medical clearance for left shoudler surgery on 05/06  He is with a hx of T2DM, HTN, HLD  Reports he had COVID in 12/2020 and since then his elyssa BP's have been more low  He is on three medications for his blood pressure with the last one that he was placed on the HCTZ being the most effective  Denies any dizziness or headaches  He is also on an oral and an injectable diabetic medication  Last HA1C on 03/23/2021 was 6 7  Hx of HLD lowered by a statin - past hx of an MI 7 years ago  The following portions of the patient's history were reviewed and updated as appropriate: allergies, current medications, past family history, past medical history, past social history, past surgical history and problem list     Review of Systems   Constitutional: Negative  HENT: Negative  Respiratory: Negative  Cardiovascular: Negative  Neurological: Negative  All other systems reviewed and are negative         Objective:      /82 (BP Location: Left arm, Patient Position: Sitting, Cuff Size: Standard)   Pulse 96   Temp 98 2 °F (36 8 °C)   Ht 5' 7" (1 702 m)   Wt 91 4 kg (201 lb 9 6 oz)   SpO2 97% BMI 31 58 kg/m²          Physical Exam  Constitutional:       Appearance: Normal appearance  HENT:      Head: Normocephalic and atraumatic  Eyes:      Extraocular Movements: Extraocular movements intact  Conjunctiva/sclera: Conjunctivae normal    Cardiovascular:      Rate and Rhythm: Normal rate and regular rhythm  Pulses: no weak pulses          Dorsalis pedis pulses are 2+ on the right side and 2+ on the left side  Heart sounds: No murmur  No gallop  Pulmonary:      Effort: Pulmonary effort is normal  No respiratory distress  Breath sounds: No wheezing or rales  Feet:      Right foot:      Skin integrity: No ulcer, skin breakdown, erythema, warmth, callus or dry skin  Left foot:      Skin integrity: No ulcer, skin breakdown, erythema, warmth, callus or dry skin  Neurological:      General: No focal deficit present  Mental Status: He is alert and oriented to person, place, and time  Mental status is at baseline  Patient's shoes and socks removed  Right Foot/Ankle   Right Foot Inspection  Skin Exam: skin normal and skin intact no dry skin, no warmth, no callus, no erythema, no maceration, no abnormal color, no pre-ulcer, no ulcer and no callus                            Sensory       Monofilament testing: intact  Vascular    The right DP pulse is 2+  Left Foot/Ankle  Left Foot Inspection  Skin Exam: skin normal and skin intactno dry skin, no warmth, no erythema, no maceration, normal color, no pre-ulcer, no ulcer and no callus                                         Sensory       Monofilament: intact  Vascular    The left DP pulse is 2+  Assign Risk Category:  No deformity present; No loss of protective sensation;  No weak pulses       Risk: 0 Wounds

## 2022-04-21 ENCOUNTER — APPOINTMENT (OUTPATIENT)
Dept: LAB | Facility: HOSPITAL | Age: 67
End: 2022-04-21
Payer: COMMERCIAL

## 2022-04-21 DIAGNOSIS — E11.9 TYPE 2 DIABETES MELLITUS WITHOUT COMPLICATION, WITHOUT LONG-TERM CURRENT USE OF INSULIN (HCC): ICD-10-CM

## 2022-04-21 DIAGNOSIS — I10 ESSENTIAL HYPERTENSION: ICD-10-CM

## 2022-04-21 DIAGNOSIS — E78.00 HIGH CHOLESTEROL: ICD-10-CM

## 2022-04-21 LAB
ALBUMIN SERPL BCP-MCNC: 3.6 G/DL (ref 3.5–5)
ALP SERPL-CCNC: 123 U/L (ref 46–116)
ALT SERPL W P-5'-P-CCNC: 33 U/L (ref 12–78)
ANION GAP SERPL CALCULATED.3IONS-SCNC: 8 MMOL/L (ref 4–13)
AST SERPL W P-5'-P-CCNC: 13 U/L (ref 5–45)
BILIRUB SERPL-MCNC: 0.42 MG/DL (ref 0.2–1)
BUN SERPL-MCNC: 18 MG/DL (ref 5–25)
CALCIUM SERPL-MCNC: 8.8 MG/DL (ref 8.3–10.1)
CHLORIDE SERPL-SCNC: 101 MMOL/L (ref 100–108)
CHOLEST SERPL-MCNC: 141 MG/DL
CO2 SERPL-SCNC: 31 MMOL/L (ref 21–32)
CREAT SERPL-MCNC: 0.86 MG/DL (ref 0.6–1.3)
GFR SERPL CREATININE-BSD FRML MDRD: 90 ML/MIN/1.73SQ M
GLUCOSE P FAST SERPL-MCNC: 135 MG/DL (ref 65–99)
HDLC SERPL-MCNC: 50 MG/DL
LDLC SERPL CALC-MCNC: 80 MG/DL (ref 0–100)
NONHDLC SERPL-MCNC: 91 MG/DL
POTASSIUM SERPL-SCNC: 3.6 MMOL/L (ref 3.5–5.3)
PROT SERPL-MCNC: 7.5 G/DL (ref 6.4–8.2)
SODIUM SERPL-SCNC: 140 MMOL/L (ref 136–145)
TRIGL SERPL-MCNC: 54 MG/DL

## 2022-04-21 PROCEDURE — 36415 COLL VENOUS BLD VENIPUNCTURE: CPT

## 2022-04-21 PROCEDURE — 80053 COMPREHEN METABOLIC PANEL: CPT

## 2022-04-21 PROCEDURE — 80061 LIPID PANEL: CPT

## 2022-05-07 DIAGNOSIS — E11.9 TYPE 2 DIABETES MELLITUS WITHOUT COMPLICATION, WITHOUT LONG-TERM CURRENT USE OF INSULIN (HCC): ICD-10-CM

## 2022-05-08 RX ORDER — DAPAGLIFLOZIN 10 MG/1
10 TABLET, FILM COATED ORAL DAILY
Qty: 90 TABLET | Refills: 0 | Status: SHIPPED | OUTPATIENT
Start: 2022-05-08 | End: 2022-07-21 | Stop reason: SDUPTHER

## 2022-05-12 DIAGNOSIS — E11.9 TYPE 2 DIABETES MELLITUS WITHOUT COMPLICATION, WITHOUT LONG-TERM CURRENT USE OF INSULIN (HCC): ICD-10-CM

## 2022-05-12 DIAGNOSIS — F41.9 ANXIETY: ICD-10-CM

## 2022-05-12 DIAGNOSIS — I10 ESSENTIAL HYPERTENSION: ICD-10-CM

## 2022-05-12 DIAGNOSIS — E78.00 HIGH CHOLESTEROL: ICD-10-CM

## 2022-05-12 RX ORDER — HYDROCHLOROTHIAZIDE 25 MG/1
25 TABLET ORAL DAILY
Qty: 90 TABLET | Refills: 0 | Status: SHIPPED | OUTPATIENT
Start: 2022-05-12

## 2022-05-12 RX ORDER — CITALOPRAM 10 MG/1
10 TABLET ORAL DAILY
Qty: 90 TABLET | Refills: 0 | Status: SHIPPED | OUTPATIENT
Start: 2022-05-12

## 2022-05-12 RX ORDER — ATORVASTATIN CALCIUM 80 MG/1
80 TABLET, FILM COATED ORAL DAILY
Qty: 90 TABLET | Refills: 0 | Status: SHIPPED | OUTPATIENT
Start: 2022-05-12

## 2022-05-12 RX ORDER — CARVEDILOL 25 MG/1
25 TABLET ORAL 2 TIMES DAILY WITH MEALS
Qty: 90 TABLET | Refills: 0 | Status: SHIPPED | OUTPATIENT
Start: 2022-05-12 | End: 2022-07-06 | Stop reason: SDUPTHER

## 2022-05-16 ENCOUNTER — OFFICE VISIT (OUTPATIENT)
Dept: FAMILY MEDICINE CLINIC | Facility: CLINIC | Age: 67
End: 2022-05-16
Payer: COMMERCIAL

## 2022-05-16 VITALS
HEART RATE: 85 BPM | HEIGHT: 67 IN | DIASTOLIC BLOOD PRESSURE: 80 MMHG | SYSTOLIC BLOOD PRESSURE: 130 MMHG | BODY MASS INDEX: 32.46 KG/M2 | WEIGHT: 206.8 LBS | TEMPERATURE: 98 F | OXYGEN SATURATION: 98 %

## 2022-05-16 DIAGNOSIS — E11.9 ENCOUNTER FOR DIABETIC FOOT EXAM (HCC): ICD-10-CM

## 2022-05-16 DIAGNOSIS — I10 PRIMARY HYPERTENSION: ICD-10-CM

## 2022-05-16 DIAGNOSIS — E11.9 TYPE 2 DIABETES MELLITUS WITHOUT COMPLICATION, WITHOUT LONG-TERM CURRENT USE OF INSULIN (HCC): Primary | ICD-10-CM

## 2022-05-16 DIAGNOSIS — K21.9 GASTROESOPHAGEAL REFLUX DISEASE WITHOUT ESOPHAGITIS: ICD-10-CM

## 2022-05-16 LAB — SL AMB POCT HEMOGLOBIN AIC: 7.9 (ref ?–6.5)

## 2022-05-16 PROCEDURE — 99213 OFFICE O/P EST LOW 20 MIN: CPT | Performed by: NURSE PRACTITIONER

## 2022-05-16 PROCEDURE — 83036 HEMOGLOBIN GLYCOSYLATED A1C: CPT | Performed by: NURSE PRACTITIONER

## 2022-05-16 RX ORDER — SEMAGLUTIDE 1.34 MG/ML
1 INJECTION, SOLUTION SUBCUTANEOUS WEEKLY
COMMUNITY
Start: 2022-02-24

## 2022-05-16 NOTE — PROGRESS NOTES
Assessment/Plan:       Diagnoses and all orders for this visit:    Type 2 diabetes mellitus without complication, without long-term current use of insulin ([facility])  -     POCT hemoglobin A1c    Gastroesophageal reflux disease without esophagitis    Primary hypertension    Encounter for diabetic foot exam ([facility] [address] )    Other orders  -     Ozempic, 1 MG/DOSE, 4 MG/3ML SOPN injection pen; Inject 1 mg under the skin once a week      BMI Counseling: Body mass index is 32 39 kg/m²  The BMI is above normal  Nutrition recommendations include encouraging healthy choices of fruits and vegetables  Rationale for BMI follow-up plan is due to patient being overweight or obese  Will have office reach out regarding his Dottie Mood and if a prior authorization is needed  The Ozempic was send to the pharmacy 4 days ago  Cholesterol labs reviewed - no concerns at this time  His HA1C has increased to 7 9 - he has not had his Dottie Mood for two weeks and notes of less physical activity  He is aware of the right dietary choices but reports he has not made good ones at this time  Subjective:      Patient ID: Tete Mooney is a 74 y o  male  Here today for a follow-up  Hx of HTN, HLD, T2DM  He is on oral and injectable agents for his T2DM  His BP is also controlled with medication as is his cholesterol  Hx also of GERD which is also controlled  The following portions of the patient's history were reviewed and updated as appropriate: allergies, current medications, past family history, past medical history, past social history, past surgical history and problem list     Review of Systems   Constitutional: Negative  Respiratory: Negative  Cardiovascular: Negative  Neurological: Negative  All other systems reviewed and are negative          Objective:      /80 (BP Location: Left arm, Patient Position: Sitting, Cuff Size: Standard)   Pulse 85   Temp 98 °F (36 7 °C)   Ht 5' 7" (1 702 m)   Wt 93 8 kg (206 lb 12 8 oz)   SpO2 98%   BMI 32 39 kg/m²          Physical Exam  Vitals and nursing note reviewed  Constitutional:       Appearance: Normal appearance  HENT:      Head: Normocephalic and atraumatic  Eyes:      Conjunctiva/sclera: Conjunctivae normal    Cardiovascular:      Rate and Rhythm: Normal rate and regular rhythm  Pulses: no weak pulses          Dorsalis pedis pulses are 2+ on the right side and 2+ on the left side  Heart sounds: Normal heart sounds  No murmur heard  No gallop  Pulmonary:      Effort: Pulmonary effort is normal  No respiratory distress  Breath sounds: Normal breath sounds  No wheezing or rales  Abdominal:      General: Abdomen is flat  Musculoskeletal:      Cervical back: Neck supple  Feet:      Right foot:      Skin integrity: No ulcer, skin breakdown, erythema, warmth, callus or dry skin  Left foot:      Skin integrity: No ulcer, skin breakdown, erythema, warmth, callus or dry skin  Neurological:      General: No focal deficit present  Mental Status: He is alert and oriented to person, place, and time  Mental status is at baseline  Cranial Nerves: No cranial nerve deficit  Psychiatric:         Mood and Affect: Mood normal          Behavior: Behavior normal          Thought Content: Thought content normal          Judgment: Judgment normal              Diabetic Foot Exam    Patient's shoes and socks removed  Right Foot/Ankle   Right Foot Inspection  Skin Exam: skin normal and skin intact  No dry skin, no warmth, no callus, no erythema, no maceration, no abnormal color, no pre-ulcer, no ulcer and no callus  Sensory   Monofilament testing: intact    Vascular  The right DP pulse is 2+  Left Foot/Ankle  Left Foot Inspection  Skin Exam: skin normal and skin intact  No dry skin, no warmth, no erythema, no maceration, normal color, no pre-ulcer, no ulcer and no callus       Sensory   Monofilament testing: intact    Vascular  The left DP pulse is 2+       Assign Risk Category  No deformity present  No loss of protective sensation  No weak pulses  Risk: 0

## 2022-05-19 ENCOUNTER — TELEPHONE (OUTPATIENT)
Dept: FAMILY MEDICINE CLINIC | Facility: CLINIC | Age: 67
End: 2022-05-19

## 2022-05-19 NOTE — TELEPHONE ENCOUNTER
Spoke with patient regarding previous DM therapies  Patient was on metformin in the past but did not tolerate (Stomach issues/ IBS)  Since then he has been on Maricarmen Jaime and Ozempic 1 mg weekly  Patient has been on Ozempic for 1 - 2 years now  This is New insuranace for this patient  He has 1 week left of Ozempic  Prior auth completed through cover my meds (Key: [id number])  Marked as urgent since patient only has 1 dose remaining

## 2022-05-19 NOTE — TELEPHONE ENCOUNTER
Patient called stating his pharmacy, [facility] mail order, needs a prior authorization for his Ozempic  Can you please help with this? Auth forms scanned into patient's media tab

## 2022-06-24 ENCOUNTER — OFFICE VISIT (OUTPATIENT)
Dept: FAMILY MEDICINE CLINIC | Facility: CLINIC | Age: 67
End: 2022-06-24
Payer: COMMERCIAL

## 2022-06-24 VITALS
OXYGEN SATURATION: 100 % | WEIGHT: 206.8 LBS | HEIGHT: 67 IN | SYSTOLIC BLOOD PRESSURE: 128 MMHG | DIASTOLIC BLOOD PRESSURE: 82 MMHG | BODY MASS INDEX: 32.46 KG/M2 | TEMPERATURE: 98.2 F | HEART RATE: 90 BPM

## 2022-06-24 DIAGNOSIS — H61.23 BILATERAL IMPACTED CERUMEN: Primary | ICD-10-CM

## 2022-06-24 PROCEDURE — 99213 OFFICE O/P EST LOW 20 MIN: CPT | Performed by: NURSE PRACTITIONER

## 2022-06-24 PROCEDURE — 69209 REMOVE IMPACTED EAR WAX UNI: CPT | Performed by: NURSE PRACTITIONER

## 2022-06-24 NOTE — PROGRESS NOTES
Assessment/Plan:       Diagnoses and all orders for this visit:    Bilateral impacted cerumen      Procedure completed, no issues  Subjective:      Patient ID: Mary Flores is a 74 y o  male  Here today for cerumen impaction of the left ear - reports of a history of allergies  Has used debrox drops to the left ear  Right ear with noted cerumen impaction  Earache   This is a new problem  The current episode started yesterday  The problem has been unchanged  There has been no fever  The pain is at a severity of 1/10  The following portions of the patient's history were reviewed and updated as appropriate: allergies, current medications, past family history, past medical history, past social history, past surgical history and problem list     Review of Systems   HENT: Positive for ear pain  See HPI         Objective:      /82 (BP Location: Left arm, Patient Position: Sitting)   Pulse 90   Temp 98 2 °F (36 8 °C)   Ht 5' 7" (1 702 m)   Wt 93 8 kg (206 lb 12 8 oz)   SpO2 100%   BMI 32 39 kg/m²          Physical Exam  HENT:      Right Ear: Ear canal and external ear normal  Decreased hearing noted  There is impacted cerumen  Left Ear: Ear canal and external ear normal  Decreased hearing noted  There is impacted cerumen  Ear cerumen removal    Date/Time: 6/24/2022 10:20 AM  Performed by: ISIDRO Munoz  Authorized by: ISIDRO Munoz   Universal Protocol:  Consent: Verbal consent obtained  Consent given by: patient  Patient understanding: patient states understanding of the procedure being performed      Patient location:  Bedside  Procedure details:     Location:  L ear and R ear    Procedure type: irrigation only      Approach:  Natural orifice  Post-procedure details:     Complication:  None    Hearing quality:  Improved    Patient tolerance of procedure:   Tolerated well, no immediate complications

## 2022-07-08 DIAGNOSIS — I10 ESSENTIAL HYPERTENSION: ICD-10-CM

## 2022-07-11 RX ORDER — CARVEDILOL 25 MG/1
25 TABLET ORAL 2 TIMES DAILY WITH MEALS
Qty: 90 TABLET | Refills: 0 | Status: SHIPPED | OUTPATIENT
Start: 2022-07-11 | End: 2022-10-06 | Stop reason: SDUPTHER

## 2022-07-21 DIAGNOSIS — E11.9 TYPE 2 DIABETES MELLITUS WITHOUT COMPLICATION, WITHOUT LONG-TERM CURRENT USE OF INSULIN (HCC): ICD-10-CM

## 2022-07-21 DIAGNOSIS — I10 ESSENTIAL HYPERTENSION: ICD-10-CM

## 2022-07-21 RX ORDER — DAPAGLIFLOZIN 10 MG/1
10 TABLET, FILM COATED ORAL DAILY
Qty: 90 TABLET | Refills: 0 | Status: SHIPPED | OUTPATIENT
Start: 2022-07-21 | End: 2022-10-26 | Stop reason: SDUPTHER

## 2022-07-21 RX ORDER — RAMIPRIL 10 MG/1
10 CAPSULE ORAL DAILY
Qty: 90 CAPSULE | Refills: 0 | Status: SHIPPED | OUTPATIENT
Start: 2022-07-21 | End: 2022-10-06 | Stop reason: SDUPTHER

## 2022-07-25 DIAGNOSIS — E11.9 TYPE 2 DIABETES MELLITUS WITHOUT COMPLICATION, WITHOUT LONG-TERM CURRENT USE OF INSULIN (HCC): ICD-10-CM

## 2022-08-17 DIAGNOSIS — E78.00 HIGH CHOLESTEROL: ICD-10-CM

## 2022-08-17 RX ORDER — ATORVASTATIN CALCIUM 80 MG/1
80 TABLET, FILM COATED ORAL DAILY
Qty: 90 TABLET | Refills: 0 | Status: SHIPPED | OUTPATIENT
Start: 2022-08-17

## 2022-09-21 DIAGNOSIS — F41.9 ANXIETY: ICD-10-CM

## 2022-09-21 DIAGNOSIS — I10 ESSENTIAL HYPERTENSION: ICD-10-CM

## 2022-09-21 RX ORDER — HYDROCHLOROTHIAZIDE 25 MG/1
25 TABLET ORAL DAILY
Qty: 90 TABLET | Refills: 0 | Status: SHIPPED | OUTPATIENT
Start: 2022-09-21 | End: 2022-09-22 | Stop reason: SDUPTHER

## 2022-09-21 RX ORDER — CITALOPRAM 10 MG/1
10 TABLET ORAL DAILY
Qty: 90 TABLET | Refills: 0 | Status: SHIPPED | OUTPATIENT
Start: 2022-09-21 | End: 2022-09-22 | Stop reason: SDUPTHER

## 2022-09-22 DIAGNOSIS — K64.9 HEMORRHOIDS, UNSPECIFIED HEMORRHOID TYPE: Primary | ICD-10-CM

## 2022-09-22 RX ORDER — CITALOPRAM 10 MG/1
10 TABLET ORAL DAILY
Qty: 90 TABLET | Refills: 1 | Status: SHIPPED | OUTPATIENT
Start: 2022-09-22

## 2022-09-22 RX ORDER — HYDROCHLOROTHIAZIDE 25 MG/1
25 TABLET ORAL DAILY
Qty: 90 TABLET | Refills: 1 | Status: SHIPPED | OUTPATIENT
Start: 2022-09-22

## 2022-09-22 RX ORDER — HYDROCORTISONE 25 MG/G
CREAM TOPICAL 2 TIMES DAILY
Qty: 28 G | Refills: 1 | Status: SHIPPED | OUTPATIENT
Start: 2022-09-22

## 2022-09-26 ENCOUNTER — OFFICE VISIT (OUTPATIENT)
Dept: FAMILY MEDICINE CLINIC | Facility: CLINIC | Age: 67
End: 2022-09-26
Payer: COMMERCIAL

## 2022-09-26 VITALS
DIASTOLIC BLOOD PRESSURE: 80 MMHG | TEMPERATURE: 97.6 F | SYSTOLIC BLOOD PRESSURE: 130 MMHG | WEIGHT: 202.6 LBS | HEIGHT: 67 IN | BODY MASS INDEX: 31.8 KG/M2 | HEART RATE: 92 BPM | OXYGEN SATURATION: 95 %

## 2022-09-26 DIAGNOSIS — K64.8 OTHER HEMORRHOIDS: Primary | ICD-10-CM

## 2022-09-26 DIAGNOSIS — E11.9 TYPE 2 DIABETES MELLITUS WITHOUT COMPLICATION, WITHOUT LONG-TERM CURRENT USE OF INSULIN (HCC): ICD-10-CM

## 2022-09-26 LAB — SL AMB POCT HEMOGLOBIN AIC: 7.5 (ref ?–6.5)

## 2022-09-26 PROCEDURE — 99214 OFFICE O/P EST MOD 30 MIN: CPT | Performed by: INTERNAL MEDICINE

## 2022-09-26 PROCEDURE — 83036 HEMOGLOBIN GLYCOSYLATED A1C: CPT | Performed by: INTERNAL MEDICINE

## 2022-09-26 NOTE — ADDENDUM NOTE
Addended by: Kim Villegas on: 9/26/2022 02:19 PM     Modules accepted: Orders ERP back with patient and discussed results and plan of care.

## 2022-09-26 NOTE — ASSESSMENT & PLAN NOTE
Suspect that the hemorrhoid and the bleeding were triggered by hard stool  Recommend continuing with the hydrocortisone cream twice a day for the next several days  Also would add fiber gummies two or three a day to soften the stool  Expect the bleeding to stop here in the next few days  He has had a colonoscopy but is due for follow-up next year

## 2022-09-26 NOTE — PROGRESS NOTES
Assessment/Plan:    Other hemorrhoids  Suspect that the hemorrhoid and the bleeding were triggered by hard stool  Recommend continuing with the hydrocortisone cream twice a day for the next several days  Also would add fiber gummies two or three a day to soften the stool  Expect the bleeding to stop here in the next few days  He has had a colonoscopy but is due for follow-up next year  Diabetes mellitus ([address] )  His A1c came down the 7 5%  Continue to limit carbohydrates in continue [place] and semaglutide  Chief Complaint     Hemorrhoids          Patient ID: Sarah Baker is a 74 y o  male who returns for evaluation of hemorrhoids  He has had problems with hemorrhoids in the past  Last week he noted pain and pressure in the anal area  He was started on hydrocortisone cream last week  He noticed blood on the toilet paper about 3 days ago  He has been taking [place] and Ozempic for his diabetes  He recently cut back on his carbohydrates  Objective:    /80 (BP Location: Right arm, Patient Position: Sitting)   Pulse 92   Temp 97 6 °F (36 4 °C)   Ht 5' 7" (1 702 m)   Wt 91 9 kg (202 lb 9 6 oz)   SpO2 95%   BMI 31 73 kg/m²     Wt Readings from Last 3 Encounters:   09/26/22 91 9 kg (202 lb 9 6 oz)   06/24/22 93 8 kg (206 lb 12 8 oz)   05/16/22 93 8 kg (206 lb 12 8 oz)         Physical Exam  Constitutional:       General: He is not in acute distress  Appearance: Normal appearance  He is not ill-appearing  Genitourinary:     Comments: He has an external hemorrhoid at about the 3 o'clock position which was not tender and did not have thrombosis  He also has some anal skin tags  Internally he was somewhat tender at the 6 o'clock position and there was some blood on the glove  No palpable masses  Prostate was normal without nodules  Neurological:      Mental Status: He is alert

## 2022-09-27 LAB
LEFT EYE DIABETIC RETINOPATHY: NORMAL
RIGHT EYE DIABETIC RETINOPATHY: NORMAL

## 2022-10-03 ENCOUNTER — OFFICE VISIT (OUTPATIENT)
Dept: FAMILY MEDICINE CLINIC | Facility: CLINIC | Age: 67
End: 2022-10-03
Payer: COMMERCIAL

## 2022-10-03 VITALS
SYSTOLIC BLOOD PRESSURE: 110 MMHG | DIASTOLIC BLOOD PRESSURE: 80 MMHG | HEART RATE: 79 BPM | TEMPERATURE: 98 F | OXYGEN SATURATION: 98 % | BODY MASS INDEX: 32.3 KG/M2 | WEIGHT: 205.8 LBS | HEIGHT: 67 IN

## 2022-10-03 DIAGNOSIS — K21.9 GASTROESOPHAGEAL REFLUX DISEASE WITHOUT ESOPHAGITIS: ICD-10-CM

## 2022-10-03 DIAGNOSIS — M25.512 CHRONIC LEFT SHOULDER PAIN: ICD-10-CM

## 2022-10-03 DIAGNOSIS — I10 PRIMARY HYPERTENSION: ICD-10-CM

## 2022-10-03 DIAGNOSIS — G89.29 CHRONIC LEFT SHOULDER PAIN: ICD-10-CM

## 2022-10-03 DIAGNOSIS — F41.9 ANXIETY: ICD-10-CM

## 2022-10-03 DIAGNOSIS — Z00.00 ANNUAL PHYSICAL EXAM: Primary | ICD-10-CM

## 2022-10-03 DIAGNOSIS — Z23 NEEDS FLU SHOT: ICD-10-CM

## 2022-10-03 DIAGNOSIS — E11.51 TYPE 2 DIABETES MELLITUS WITH DIABETIC PERIPHERAL ANGIOPATHY WITHOUT GANGRENE, WITHOUT LONG-TERM CURRENT USE OF INSULIN (HCC): ICD-10-CM

## 2022-10-03 DIAGNOSIS — E78.00 HIGH CHOLESTEROL: ICD-10-CM

## 2022-10-03 PROCEDURE — 99397 PER PM REEVAL EST PAT 65+ YR: CPT | Performed by: NURSE PRACTITIONER

## 2022-10-03 NOTE — PROGRESS NOTES
ADULT ANNUAL [facility] PRIMARY CARE    NAME: Jacqui Miller  AGE: 74 y o  SEX: male  : 1955     DATE: 10/3/2022     Assessment and Plan:     Problem List Items Addressed This Visit        Digestive    Gastroesophageal reflux disease without esophagitis       Endocrine    Diabetes mellitus ([address] )       Cardiovascular and Mediastinum    Hypertension       Other    Chronic left shoulder pain    High cholesterol    Anxiety      Other Visit Diagnoses     Annual physical exam    -  Primary    Needs flu shot        Relevant Orders    influenza vaccine, high-dose, PF 0 7 mL (FLUZONE HIGH-DOSE)          Immunizations and preventive care screenings were discussed with patient today  Appropriate education was printed on patient's after visit summary  Discussed risks and benefits of prostate cancer screening  We discussed the controversial history of PSA screening for prostate cancer in the [place] as well as the risk of over detection and over treatment of prostate cancer by way of PSA screening  The patient understands that PSA blood testing is an imperfect way to screen for prostate cancer and that elevated PSA levels in the blood may also be caused by infection, inflammation, prostatic trauma or manipulation, urological procedures, or by benign prostatic enlargement  The role of the digital rectal examination in prostate cancer screening was also discussed and I discussed with him that there is large interobserver variability in the findings of digital rectal examination  Counseling:  · Exercise: the importance of regular exercise/physical activity was discussed  Recommend exercise 3-5 times per week for at least 30 minutes  BMI Counseling: Body mass index is 32 23 kg/m²  The BMI is above normal  Nutrition recommendations include encouraging healthy choices of fruits and vegetables   Exercise recommendations include moderate physical activity 150 minutes/week  Rationale for BMI follow-up plan is due to patient being overweight or obese  Return in 3 months (on 1/3/2023)  Chief Complaint:     Chief Complaint   Patient presents with    Physical Exam      History of Present Illness:     Adult Annual Physical   Patient here for a comprehensive physical exam  The patient reports no problems  Diet and Physical Activity  · Diet/Nutrition: well balanced diet  · Exercise: moderate cardiovascular exercise  Depression Screening  PHQ-2/9 Depression Screening         General Health  · Sleep: sleeps well  · Hearing: normal - bilateral   · Vision: no vision problems, most recent eye exam <1 year ago and wears glasses  · Dental: regular dental visits   Health  · Symptoms include: none     Review of Systems:     Review of Systems   Constitutional: Negative  Respiratory: Negative  Cardiovascular: Negative  Neurological: Negative  All other systems reviewed and are negative  Past Medical History:     Past Medical History:   Diagnosis Date    Allergic Seasonal    Anesthesia     Pt reports that he became agitated after IV sedation for a colonoscopy    Coronary artery disease 08/2014    COVID-19 01/2021    Pt reports fever, fatigue, nausea and weakness  Pt denies breathing issues  Symptoms lasted 2 weeks   Diabetes mellitus ([address] )     GERD (gastroesophageal reflux disease)     Hard to intubate     Pt received a letter stating that it was hard to visualize vocal chords    Heart attack ([address] )     High cholesterol     Hypertension     Myocardial infarction [facility]) 08/2014    Stent    Wears glasses       Past Surgical History:     Past Surgical History:   Procedure Laterality Date    CARDIAC CATHETERIZATION  08/19/2014    Pt reports having one stent placed      CHOLECYSTECTOMY      COLONOSCOPY      MS SURGICAL ARTHROSCOPY SHOULDER XTNSV DBRDMT 3+ Left 5/6/2021    Procedure: ARTHROSCOPY SHOULDER , debridement; open rotator cuff repair;  Surgeon: Keara Cruz; Location:  MAIN OR;  Service: Orthopedics    VASECTOMY      VASECTOMY REVERSAL        Family History:     Family History   Problem Relation Age of Onset    Dementia Mother     No Known Problems Father       Social History:     Social History     Socioeconomic History    Marital status: /Civil Union     Spouse name: None    Number of children: None    Years of education: None    Highest education level: None   Occupational History    None   Tobacco Use    Smoking status: Former Smoker     Packs/day: 0 00     Years: 30 00     Pack years: 0 00     Types: Cigarettes     Quit date: 2015     Years since quittin 6    Smokeless tobacco: Never Used   Vaping Use    Vaping Use: Never used   Substance and Sexual Activity    Alcohol use:  Yes     Alcohol/week: 5 0 standard drinks     Types: 5 Cans of beer per week     Comment: occasionally    Drug use: Never    Sexual activity: Yes     Partners: Female     Birth control/protection: Female Sterilization   Other Topics Concern    None   Social History Narrative    None     Social Determinants of Health     Financial Resource Strain: Not on file   Food Insecurity: Not on file   Transportation Needs: Not on file   Physical Activity: Not on file   Stress: Not on file   Social Connections: Not on file   Intimate Partner Violence: Not on file   Housing Stability: Not on file      Current Medications:     Current Outpatient Medications   Medication Sig Dispense Refill    aspirin (ECOTRIN LOW STRENGTH) 81 mg EC tablet Take 81 mg by mouth daily Pt stopped on May 2 for surgery on       atorvastatin (LIPITOR) 80 mg tablet Take 1 tablet (80 mg total) by mouth daily 90 tablet 0    carvedilol (COREG) 25 mg tablet Take 1 tablet (25 mg total) by mouth 2 (two) times a day with meals 90 tablet 0    citalopram (CeleXA) 10 mg tablet Take 1 tablet (10 mg total) by mouth daily 90 tablet 1    Coenzyme Q10 (CoQ-10) 100 MG CAPS Take 100 mg by mouth daily       esomeprazole (NexIUM) 5 MG packet Take 5 mg by mouth daily 90 each 1    Farxiga 10 MG TABS Take 1 tablet (10 mg total) by mouth daily 90 tablet 0    hydrochlorothiazide (HYDRODIURIL) 25 mg tablet Take 1 tablet (25 mg total) by mouth daily 90 tablet 1    hydrocortisone (ANUSOL-HC) 2 5 % rectal cream Apply topically 2 (two) times a day 28 g 1    Multiple Vitamins-Minerals (PreserVision AREDS 2) CAPS Take 1 capsule by mouth daily       ramipril (ALTACE) 10 MG capsule Take 1 capsule (10 mg total) by mouth daily 90 capsule 0    Semaglutide, 1 MG/DOSE, 2 MG/1 5ML SOPN Inject 1 mg under the skin once a week 12 mL 1     No current facility-administered medications for this visit  Allergies: Allergies   Allergen Reactions    Food Allergy [Soybean Oil - Food Allergy] Anaphylaxis     Organic soy milk  Pt reports having allergy shots at that time  Physical Exam:     /80 (BP Location: Left arm, Patient Position: Sitting, Cuff Size: Standard)   Pulse 79   Temp 98 °F (36 7 °C)   Ht 5' 7" (1 702 m)   Wt 93 4 kg (205 lb 12 8 oz)   SpO2 98%   BMI 32 23 kg/m²     Physical Exam  Constitutional:       Appearance: Normal appearance  HENT:      Head: Normocephalic and atraumatic  Cardiovascular:      Rate and Rhythm: Normal rate and regular rhythm  Pulses:           Dorsalis pedis pulses are 2+ on the right side and 2+ on the left side  Heart sounds: No murmur heard  No gallop  Pulmonary:      Effort: Pulmonary effort is normal  No respiratory distress  Breath sounds: Normal breath sounds  No wheezing or rales  Musculoskeletal:      Cervical back: Neck supple  Feet:      Right foot:      Skin integrity: No ulcer, skin breakdown, erythema, warmth, callus or dry skin  Left foot:      Skin integrity: No ulcer, skin breakdown, erythema, warmth, callus or dry skin  Neurological:      General: No focal deficit present        Mental Status: He is alert and oriented to person, place, and time  Mental status is at baseline  Psychiatric:         Mood and Affect: Mood normal          Behavior: Behavior normal          Thought Content:  Thought content normal          Judgment: Judgment normal           ISIDRO Hendricks  [facility]

## 2022-10-03 NOTE — PATIENT INSTRUCTIONS

## 2022-10-06 DIAGNOSIS — I10 ESSENTIAL HYPERTENSION: ICD-10-CM

## 2022-10-06 RX ORDER — CARVEDILOL 25 MG/1
25 TABLET ORAL 2 TIMES DAILY WITH MEALS
Qty: 90 TABLET | Refills: 0 | Status: SHIPPED | OUTPATIENT
Start: 2022-10-06

## 2022-10-06 RX ORDER — RAMIPRIL 10 MG/1
10 CAPSULE ORAL DAILY
Qty: 90 CAPSULE | Refills: 0 | Status: SHIPPED | OUTPATIENT
Start: 2022-10-06

## 2022-10-26 DIAGNOSIS — E11.9 TYPE 2 DIABETES MELLITUS WITHOUT COMPLICATION, WITHOUT LONG-TERM CURRENT USE OF INSULIN (HCC): ICD-10-CM

## 2022-10-26 RX ORDER — DAPAGLIFLOZIN 10 MG/1
10 TABLET, FILM COATED ORAL DAILY
Qty: 90 TABLET | Refills: 0 | Status: SHIPPED | OUTPATIENT
Start: 2022-10-26

## 2022-11-01 DIAGNOSIS — E11.9 TYPE 2 DIABETES MELLITUS WITHOUT COMPLICATION, WITHOUT LONG-TERM CURRENT USE OF INSULIN (HCC): ICD-10-CM

## 2022-12-14 DIAGNOSIS — I10 ESSENTIAL HYPERTENSION: ICD-10-CM

## 2022-12-14 DIAGNOSIS — F41.9 ANXIETY: ICD-10-CM

## 2022-12-14 RX ORDER — HYDROCHLOROTHIAZIDE 25 MG/1
25 TABLET ORAL DAILY
Qty: 90 TABLET | Refills: 0 | Status: SHIPPED | OUTPATIENT
Start: 2022-12-14

## 2022-12-14 RX ORDER — CITALOPRAM 10 MG/1
10 TABLET ORAL DAILY
Qty: 90 TABLET | Refills: 0 | Status: SHIPPED | OUTPATIENT
Start: 2022-12-14

## 2022-12-29 ENCOUNTER — OFFICE VISIT (OUTPATIENT)
Dept: FAMILY MEDICINE CLINIC | Facility: CLINIC | Age: 67
End: 2022-12-29

## 2022-12-29 VITALS
HEART RATE: 110 BPM | TEMPERATURE: 97.3 F | SYSTOLIC BLOOD PRESSURE: 140 MMHG | OXYGEN SATURATION: 95 % | WEIGHT: 205 LBS | BODY MASS INDEX: 32.18 KG/M2 | HEIGHT: 67 IN | DIASTOLIC BLOOD PRESSURE: 72 MMHG

## 2022-12-29 DIAGNOSIS — J34.89 SINUS PRESSURE: ICD-10-CM

## 2022-12-29 DIAGNOSIS — U07.1 COVID-19: Primary | ICD-10-CM

## 2022-12-29 LAB
SARS-COV-2 AG UPPER RESP QL IA: POSITIVE
VALID CONTROL: ABNORMAL

## 2022-12-29 RX ORDER — NIRMATRELVIR AND RITONAVIR 300-100 MG
3 KIT ORAL 2 TIMES DAILY
Qty: 30 TABLET | Refills: 0 | Status: SHIPPED | OUTPATIENT
Start: 2022-12-29 | End: 2023-01-03

## 2022-12-29 NOTE — PROGRESS NOTES
COVID-19 Outpatient Progress Note    Assessment/Plan:    Problem List Items Addressed This Visit    None  Visit Diagnoses     COVID-19    -  Primary    Relevant Medications    nirmatrelvir & ritonavir (Paxlovid, 300/100,) tablet therapy pack    Sinus pressure        Relevant Medications    nirmatrelvir & ritonavir (Paxlovid, 300/100,) tablet therapy pack         Disposition:     Rapid antigen testing was performed and the result is POSITIVE for COVID-19  Patient has asymptomatic or mild COVID-19 infection  Based off CDC guidelines, they were recommended to isolate for 5 days  If they are asymptomatic or symptoms are improving with no fevers in the past 24 hours, isolation may be ended followed by 5 days of wearing a mask when around othes to minimize risk of infecting others  If still have a fever or other symptoms have not improved, continue to isolate until they improve  Regardless of when they end isolation, avoid being around people who are more likely to get very sick from COVID-19 until at least day 11  Patient meets criteria for PAXLOVID and they have been counseled appropriately according to EUA documentation released by the FDA  After discussion, patient agrees to treatment  Bryant Raghu is an investigational medicine used to treat mild-to-moderate COVID-19 in adults and children (15years of age and older weighing at least 80 pounds (40 kg)) with positive results of direct SARS-CoV-2 viral testing, and who are at high risk for progression to severe COVID-19, including hospitalization or death  PAXLOVID is investigational because it is still being studied  There is limited information about the safety and effectiveness of using PAXLOVID to treat people with mild-to-moderate COVID-19      The FDA has authorized the emergency use of PAXLOVID for the treatment of mild-tomoderate COVID-19 in adults and children (15years of age and older weighing at least 80 pounds (40 kg)) with a positive test for the virus that causes COVID-19, and who are at high risk for progression to severe COVID-19, including hospitalization or death, under an EUA  What should I tell my healthcare provider before I take PAXLOVID? Tell your healthcare provider if you:  - Have any allergies  - Have liver or kidney disease  - Are pregnant or plan to become pregnant  - Are breastfeeding a child  - Have any serious illnesses    Tell your healthcare provider about all the medicines you take, including prescription and over-the-counter medicines, vitamins, and herbal supplements  Some medicines may interact with PAXLOVID and may cause serious side effects  Keep a list of your medicines to show your healthcare provider and pharmacist when you get a new medicine  You can ask your healthcare provider or pharmacist for a list of medicines that interact with PAXLOVID  Do not start taking a new medicine without telling your healthcare provider  Your healthcare provider can tell you if it is safe to take PAXLOVID with other medicines  Tell your healthcare provider if you are taking combined hormonal contraceptive  PAXLOVID may affect how your birth control pills work  Females who are able to become pregnant should use another effective alternative form of contraception or an additional barrier method of contraception  Talk to your healthcare provider if you have any questions about contraceptive methods that might be right for you  How do I take PAXLOVID? PAXLOVID consists of 2 medicines: nirmatrelvir and ritonavir  - Take 2 pink tablets of nirmatrelvir with 1 white tablet of ritonavir by mouth 2 times each day (in the morning and in the evening) for 5 days  For each dose, take all 3 tablets at the same time  - If you have kidney disease, talk to your healthcare provider  You may need a different dose  - Swallow the tablets whole  Do not chew, break, or crush the tablets  - Take PAXLOVID with or without food    - Do not stop taking PAXLOVID without talking to your healthcare provider, even if you feel better  - If you miss a dose of PAXLOVID within 8 hours of the time it is usually taken, take it as soon as you remember  If you miss a dose by more than 8 hours, skip the missed dose and take the next dose at your regular time  Do not take 2 doses of PAXLOVID at the same time  - If you take too much PAXLOVID, call your healthcare provider or go to the nearest hospital emergency room right away  - If you are taking a ritonavir- or cobicistat-containing medicine to treat hepatitis C or Human Immunodeficiency Virus (HIV), you should continue to take your medicine as prescribed by your healthcare provider   - Talk to your healthcare provider if you do not feel better or if you feel worse after 5 days  Who should generally not take PAXLOVID? Do not take PAXLOVID if:  You are allergic to nirmatrelvir, ritonavir, or any of the ingredients in PAXLOVID  You are taking any of the following medicines:  - Alfuzosin  - Pethidine, piroxicam, propoxyphene  - Ranolazine  - Amiodarone, dronedarone, flecainide, propafenone, quinidine  - Colchicine  - Lurasidone, pimozide, clozapine  - Dihydroergotamine, ergotamine, methylergonovine  - Lovastatin, simvastatin  - Sildenafil (Revatio®) for pulmonary arterial hypertension (PAH)  - Triazolam, oral midazolam  - Apalutamide  - Carbamazepine, phenobarbital, phenytoin  - Rifampin  - St  Usama’s Wort (hypericum perforatum)    What are the important possible side effects of PAXLOVID? Possible side effects of PAXLOVID are:  - Liver Problems  Tell your healthcare provider right away if you have any of these signs and symptoms of liver problems: loss of appetite, yellowing of your skin and the whites of eyes (jaundice), dark-colored urine, pale colored stools and itchy skin, stomach area (abdominal) pain  - Resistance to HIV Medicines   If you have untreated HIV infection, PAXLOVID may lead to some HIV medicines not working as well in the future  - Other possible side effects include: altered sense of taste, diarrhea, high blood pressure, or muscle aches    These are not all the possible side effects of PAXLOVID  Not many people have taken PAXLOVID  Serious and unexpected side effects may happen  [place] is still being studied, so it is possible that all of the risks are not known at this time  What other treatment choices are there? Like Samir Martinez may allow for the emergency use of other medicines to treat people with COVID-19  Go to https://Materia/ for information on the emergency use of other medicines that are authorized by FDA to treat people with COVID-19  Your healthcare provider may talk with you about clinical trials for which you may be eligible  It is your choice to be treated or not to be treated with PAXLOVID  Should you decide not to receive it or for your child not to receive it, it will not change your standard medical care  What if I am pregnant or breastfeeding? There is no experience treating pregnant women or breastfeeding mothers with PAXLOVID  For a mother and unborn baby, the benefit of taking PAXLOVID may be greater than the risk from the treatment  If you are pregnant, discuss your options and specific situation with your healthcare provider  It is recommended that you use effective barrier contraception or do not have sexual activity while taking PAXLOVID  If you are breastfeeding, discuss your options and specific situation with your healthcare provider  How do I report side effects with PAXLOVID? Contact your healthcare provider if you have any side effects that bother you or do not go away      Report side effects to FDA MedWatch at www fda gov/medwatch or call 5-702-COG2676 or you can report side effects to [facility]  at the contact information provided below  Website Fax number Telephone number   [employer] [phone]     How should I store [address]? Store PAXLOVID tablets at room temperature between 68°F to 77°F (20°C to 25°C)  Full fact sheet for patients, parents, and caregivers can be found at: [employer] raj barnes    I have spent 15 minutes directly with the patient  Greater than 50% of this time was spent in counseling/coordination of care regarding: prognosis, risks and benefits of treatment options, instructions for management and impressions  Encounter provider: ISIDRO Khanna     Provider located at: [address] [place] [address] A  [address] [place] [address] [address] [facility] [id number]     Recent Visits  No visits were found meeting these conditions  Showing recent visits within past 7 days and meeting all other requirements  Today's Visits  Date Type Provider Dept   [facility]   Showing today's visits and meeting all other requirements  Future Appointments  No visits were found meeting these conditions  Showing future appointments within next 150 days and meeting all other requirements     Subjective:   Zuleyma Rivera is a 74 y o  male who is concerned about COVID-19  Patient's symptoms include fever, nasal congestion, cough and headache  Patient denies sore throat, abdominal pain, nausea, vomiting and diarrhea       - Date of symptom onset: 12/27/2022      COVID-19 vaccination status: Fully vaccinated with booster    Exposure:   Contact with a person who is under investigation (PUI) for or who is positive for COVID-19 within the last 14 days?: Yes    Hospitalized recently for fever and/or lower respiratory symptoms?: No      Currently a healthcare worker that is involved in direct patient care?: No      Works in a special setting where the risk of COVID-19 transmission may be high? (this may include long-term care, correctional and correction facilities; homeless shelters; assisted-living facilities and group homes ): No      Resident in a special setting where the risk of COVID-19 transmission may be high? (this may include long-term care, correctional and correction facilities; homeless shelters; assisted-living facilities and group homes ): No      Lab Results   Component Value Date    SARSCORONAVI Detected (A) 01/18/2021       Review of Systems   Constitutional: Positive for fever  HENT: Positive for congestion  Negative for ear pain and sore throat  Respiratory: Positive for cough  Negative for wheezing  Cardiovascular: Negative for chest pain  Gastrointestinal: Negative for abdominal pain, diarrhea, nausea and vomiting  Genitourinary: Negative for dysuria  Skin: Negative for rash  Neurological: Positive for headaches       Current Outpatient Medications on File Prior to Visit   Medication Sig   • aspirin (ECOTRIN LOW STRENGTH) 81 mg EC tablet Take 81 mg by mouth daily Pt stopped on May 2 for surgery on 5/6   • atorvastatin (LIPITOR) 80 mg tablet Take 1 tablet (80 mg total) by mouth daily   • carvedilol (COREG) 25 mg tablet Take 1 tablet (25 mg total) by mouth 2 (two) times a day with meals   • citalopram (CeleXA) 10 mg tablet Take 1 tablet (10 mg total) by mouth daily   • Coenzyme Q10 (CoQ-10) 100 MG CAPS Take 100 mg by mouth daily    • esomeprazole (NexIUM) 5 MG packet Take 5 mg by mouth daily   • Farxiga 10 MG tablet Take 1 tablet (10 mg total) by mouth daily   • hydrochlorothiazide (HYDRODIURIL) 25 mg tablet Take 1 tablet (25 mg total) by mouth daily   • hydrocortisone (ANUSOL-HC) 2 5 % rectal cream Apply topically 2 (two) times a day   • Multiple Vitamins-Minerals (PreserVision AREDS 2) CAPS Take 1 capsule by mouth daily    • ramipril (ALTACE) 10 MG capsule Take 1 capsule (10 mg total) by mouth daily   • Semaglutide, 1 MG/DOSE, 2 MG/1 5ML SOPN Inject 1 mg under the skin once a week       Objective:    /72 (BP Location: Left arm, Patient Position: Sitting, Cuff Size: Standard)   Pulse (!) 110   Temp (!) 97 3 °F (36 3 °C)   Ht 5' 7" (1 702 m)   Wt 93 kg (205 lb)   SpO2 95%   BMI 32 11 kg/m²      Physical Exam  Pulmonary:      Effort: Pulmonary effort is normal       Breath sounds: Normal breath sounds  Neurological:      Mental Status: He is alert and oriented to person, place, and time         ISIDRO Ng  Answers for HPI/ROS submitted by the patient on 12/28/2022  Chronicity: new  Onset: today  Frequency: constantly  Progression since onset: waxing and waning  Max temp prior to arrival: 99 to 99 9 F  Temperature source: a tympanic thermometer  muscle aches: No  sleepiness: No

## 2023-01-05 DIAGNOSIS — I10 ESSENTIAL HYPERTENSION: ICD-10-CM

## 2023-01-05 DIAGNOSIS — E11.9 TYPE 2 DIABETES MELLITUS WITHOUT COMPLICATION, WITHOUT LONG-TERM CURRENT USE OF INSULIN (HCC): ICD-10-CM

## 2023-01-06 RX ORDER — RAMIPRIL 10 MG/1
10 CAPSULE ORAL DAILY
Qty: 90 CAPSULE | Refills: 0 | Status: SHIPPED | OUTPATIENT
Start: 2023-01-06

## 2023-01-06 RX ORDER — DAPAGLIFLOZIN 10 MG/1
10 TABLET, FILM COATED ORAL DAILY
Qty: 90 TABLET | Refills: 0 | Status: SHIPPED | OUTPATIENT
Start: 2023-01-06

## 2023-03-16 ENCOUNTER — OFFICE VISIT (OUTPATIENT)
Dept: FAMILY MEDICINE CLINIC | Facility: CLINIC | Age: 68
End: 2023-03-16

## 2023-03-16 VITALS
DIASTOLIC BLOOD PRESSURE: 66 MMHG | SYSTOLIC BLOOD PRESSURE: 144 MMHG | TEMPERATURE: 97.6 F | OXYGEN SATURATION: 98 % | HEART RATE: 82 BPM | WEIGHT: 202 LBS | HEIGHT: 67 IN | BODY MASS INDEX: 31.71 KG/M2

## 2023-03-16 DIAGNOSIS — K21.9 GASTROESOPHAGEAL REFLUX DISEASE WITHOUT ESOPHAGITIS: ICD-10-CM

## 2023-03-16 DIAGNOSIS — E11.51 TYPE 2 DIABETES MELLITUS WITH DIABETIC PERIPHERAL ANGIOPATHY WITHOUT GANGRENE, WITHOUT LONG-TERM CURRENT USE OF INSULIN (HCC): Primary | ICD-10-CM

## 2023-03-16 DIAGNOSIS — I10 PRIMARY HYPERTENSION: ICD-10-CM

## 2023-03-16 DIAGNOSIS — E78.00 HIGH CHOLESTEROL: ICD-10-CM

## 2023-03-16 DIAGNOSIS — F41.9 ANXIETY: ICD-10-CM

## 2023-03-16 DIAGNOSIS — B36.9 FUNGAL RASH OF TORSO: ICD-10-CM

## 2023-03-16 DIAGNOSIS — I10 ESSENTIAL HYPERTENSION: ICD-10-CM

## 2023-03-16 LAB
CREAT UR-MCNC: 68.2 MG/DL
MICROALBUMIN UR-MCNC: 34.7 MG/L (ref 0–20)
MICROALBUMIN/CREAT 24H UR: 51 MG/G CREATININE (ref 0–30)
SL AMB POCT HEMOGLOBIN AIC: 7.5 (ref ?–6.5)

## 2023-03-16 RX ORDER — NYSTATIN 100000 U/G
CREAM TOPICAL 2 TIMES DAILY
Qty: 30 G | Refills: 0 | Status: SHIPPED | OUTPATIENT
Start: 2023-03-16 | End: 2023-03-16 | Stop reason: SDUPTHER

## 2023-03-16 RX ORDER — HYDROCHLOROTHIAZIDE 25 MG/1
25 TABLET ORAL DAILY
Qty: 90 TABLET | Refills: 3 | Status: SHIPPED | OUTPATIENT
Start: 2023-03-16

## 2023-03-16 RX ORDER — SEMAGLUTIDE 1.34 MG/ML
INJECTION, SOLUTION SUBCUTANEOUS
COMMUNITY
Start: 2023-01-06

## 2023-03-16 RX ORDER — NYSTATIN 100000 U/G
CREAM TOPICAL 2 TIMES DAILY
Qty: 30 G | Refills: 0 | Status: SHIPPED | OUTPATIENT
Start: 2023-03-16

## 2023-03-16 RX ORDER — CITALOPRAM 10 MG/1
10 TABLET ORAL DAILY
Qty: 90 TABLET | Refills: 3 | Status: SHIPPED | OUTPATIENT
Start: 2023-03-16

## 2023-03-16 NOTE — ASSESSMENT & PLAN NOTE
Systolic mildly elevated - reports recent stressors with his mother recently passing and his sister-in-law undergoing treatments for leukemia - they are taking her to appointments etc

## 2023-03-16 NOTE — ASSESSMENT & PLAN NOTE
Lab Results   Component Value Date    HGBA1C 7 5 (A) 09/26/2022   HA1C remains the same - continue with current medication regimen

## 2023-03-16 NOTE — PROGRESS NOTES
Name: Nancy Thakkar      : 1955      MRN: [id number]  Encounter Provider: ISIDRO Bardales  Encounter Date: 3/16/2023   Encounter department: [address] [place] [address],[address]     1  Type 2 diabetes mellitus with diabetic peripheral angiopathy without gangrene, without long-term current use of insulin [facility])  Assessment & Plan:    Lab Results   Component Value Date    HGBA1C 7 5 (A) 2022   HA1C remains the same - continue with current medication regimen  Orders:  -     POCT hemoglobin A1c  -     Basic metabolic panel; Future  -     Microalbumin / creatinine urine ratio    2  Essential hypertension  -     hydrochlorothiazide (HYDRODIURIL) 25 mg tablet; Take 1 tablet (25 mg total) by mouth daily    3  Primary hypertension  Assessment & Plan:  Systolic mildly elevated - reports recent stressors with his mother recently passing and his sister-in-law undergoing treatments for leukemia - they are taking her to appointments etc         4  Fungal rash of torso  -     nystatin (MYCOSTATIN) cream; Apply topically 2 (two) times a day    5  Anxiety  Assessment & Plan:  Increased but is on citalopram - refill provided  Orders:  -     citalopram (CeleXA) 10 mg tablet; Take 1 tablet (10 mg total) by mouth daily    6  High cholesterol  Assessment & Plan: On statin      7  Gastroesophageal reflux disease without esophagitis  Assessment & Plan:  Controlled  Suspect rash is fungal in nature - notes sitting in the car for long periods and sweating  He did try triamcinolone cream with no effect  Will try nystatin cream at this time  Subjective      Here today for a chest rash and a follow-up  Reports onset of a rash on his chest area one month ago - notes it waxes and wanes in intensity  Denies pain with the rash but it does itch  Hx of diabetes, HTN, HLD, and GERD - reports overall he is feeling ok    Has stressors right now at home with sick family members  Rash      Review of Systems   Constitutional: Negative  Respiratory: Negative  Cardiovascular: Negative  Skin: Positive for rash  Neurological: Negative  All other systems reviewed and are negative  Current Outpatient Medications on File Prior to Visit   Medication Sig   • aspirin (ECOTRIN LOW STRENGTH) 81 mg EC tablet Take 81 mg by mouth daily Pt stopped on May 2 for surgery on 5/6   • atorvastatin (LIPITOR) 80 mg tablet Take 1 tablet (80 mg total) by mouth daily   • carvedilol (COREG) 25 mg tablet Take 1 tablet (25 mg total) by mouth 2 (two) times a day with meals   • Coenzyme Q10 (CoQ-10) 100 MG CAPS Take 100 mg by mouth daily    • esomeprazole (NexIUM) 5 MG packet Take 5 mg by mouth daily   • Farxiga 10 MG tablet Take 1 tablet (10 mg total) by mouth daily   • hydrocortisone (ANUSOL-HC) 2 5 % rectal cream Apply topically 2 (two) times a day   • Multiple Vitamins-Minerals (PreserVision AREDS 2) CAPS Take 1 capsule by mouth daily    • Ozempic, 1 MG/DOSE, 4 MG/3ML injection pen    • ramipril (ALTACE) 10 MG capsule Take 1 capsule (10 mg total) by mouth daily   • Semaglutide, 1 MG/DOSE, 2 MG/1 5ML SOPN Inject 1 mg under the skin once a week   • [DISCONTINUED] citalopram (CeleXA) 10 mg tablet Take 1 tablet (10 mg total) by mouth daily   • [DISCONTINUED] hydrochlorothiazide (HYDRODIURIL) 25 mg tablet Take 1 tablet (25 mg total) by mouth daily       Objective     /66 (BP Location: Left arm, Patient Position: Sitting, Cuff Size: Large)   Pulse 82   Temp 97 6 °F (36 4 °C)   Ht 5' 7" (1 702 m)   Wt 91 6 kg (202 lb)   SpO2 98%   BMI 31 64 kg/m²     Physical Exam  Vitals and nursing note reviewed  Constitutional:       Appearance: Normal appearance  HENT:      Head: Normocephalic and atraumatic  Eyes:      Conjunctiva/sclera: Conjunctivae normal    Cardiovascular:      Rate and Rhythm: Normal rate and regular rhythm  Heart sounds: Normal heart sounds   No murmur heard     No gallop  Pulmonary:      Effort: Pulmonary effort is normal  No respiratory distress  Breath sounds: Normal breath sounds  No wheezing or rales  Abdominal:      General: Abdomen is flat  Musculoskeletal:      Cervical back: Neck supple  Skin:     Findings: Rash present  Rash is crusting and papular  Comments: See images   Neurological:      General: No focal deficit present  Mental Status: He is alert and oriented to person, place, and time  Mental status is at baseline  Cranial Nerves: No cranial nerve deficit  Psychiatric:         Mood and Affect: Mood normal          Behavior: Behavior normal          Thought Content:  Thought content normal          Judgment: Judgment normal        ISIDRO Jimenes

## 2023-03-16 NOTE — PATIENT INSTRUCTIONS
Basic Carbohydrate Counting   AMBULATORY CARE:   Carbohydrate counting  is a way to plan your meals by counting the amount of carbohydrate in foods  Carbohydrates are the sugars, starches, and fiber found in fruit, grains, vegetables, and milk products  Carbohydrates increase your blood sugar levels  Carbohydrate counting can help you eat the right amount of carbohydrate to keep your blood sugar levels under control  What you need to know about planning meals using carbohydrate counting:  • A dietitian or healthcare provider will help you develop a healthy meal plan that works best for you  You will be taught how much carbohydrate to eat or drink for each meal and snack  Your meal plan will be based on your age, weight, usual food intake, and physical activity level  If you have diabetes, it will also include your blood sugar levels and diabetes medicine  Once you know how much carbohydrate you should eat, you can decide what type of food you want to eat  • You will need to know what foods contain carbohydrate and how much they contain  Keep track of the amount of carbohydrate in meals and snacks in order to follow your meal plan  Do not avoid carbohydrates or skip meals  Your blood sugar may fall too low if you do not eat enough carbohydrate or you skip meals  Foods that contain carbohydrate:   • Breads:  Each serving of food listed below contains about 15 g of carbohydrate   ? 1 slice of bread (1 ounce) or 1 flour or corn tortilla (6 inch)    ? ½ of a hamburger bun or ¼ of a large bagel (about 1 ounce)    ? 1 pancake (about 4 inches across and ¼ inch thick)    • Cereals and grains:  Serving sizes of ready-to-eat cereals vary  Look at the serving size and the total carbohydrate amount listed on the food label  Each serving of food listed below contains about 15 g of carbohydrate   ? ¾ cup of dry, unsweetened, ready-to-eat cereal or ¼ cup of low-fat granola     ?  ½ cup of oatmeal or other cooked cereal     ? ? cup of cooked rice or pasta    • Starchy vegetables and beans:  Each serving of food listed below contains about 15 g of carbohydrate   ? ½ cup of corn, green peas, sweet potatoes, or mashed potatoes    ? ¼ of a large baked potato    ? ½ cup of beans, lentils, and peas (garbanzo, mcdonald, kidney, white, split, black-eyed)    • Crackers and snacks:  Each serving of food listed below contains about 15 g of carbohydrate   ? 3 galileo cracker squares or 8 animal crackers     ? 6 saltine-type crackers    ? 3 cups of popcorn or ¾ ounce of pretzels, potato chips, or tortilla chips    • Fruit:  Each serving of food listed below contains about 15 g of carbohydrate   ? 1 small (4 ounce) piece of fresh fruit or ¾ to 1 cup of fresh fruit    ? ½ cup of canned or frozen fruit, packed in natural juice    ? ½ cup (4 ounces) of unsweetened fruit juice    ? 2 tablespoons of dried fruit    • Desserts or sugary foods:  Each serving of food listed below contains about 15 g of carbohydrate   ? 2-inch square unfrosted cake or brownie     ? 2 small cookies    ? ½ cup of ice cream, frozen yogurt, or nondairy frozen yogurt    ? ¼ cup of sherbet or sorbet    ? 1 tablespoon of regular syrup, jam, or jelly    ? 2 tablespoons of light syrup    • Milk and yogurt:  Foods from the milk group contain about 12 g of carbohydrate per serving  ? 1 cup of fat-free or low-fat milk    ? 1 cup of soy milk    ? ? cup of fat-free, yogurt sweetened with artificial sweetener    • Non-starchy vegetables:  Each serving contains about 5 g of carbohydrate   Three servings of non-starch vegetables count as 1 carbohydrate serving  ? ½ cup of cooked vegetables or 1 cup of raw vegetables  This includes beets, broccoli, cabbage, cauliflower, cucumber, mushrooms, tomatoes, and zucchini    ?  ½ cup of vegetable juice    How to use carbohydrate counting to plan meals:   • Count carbohydrate amounts using serving sizes:      ? Pasta dinner example: You plan to have pasta, tossed salad, and an 8-ounce glass of milk  Your healthcare provider tells you that you may have 4 carbohydrate servings for dinner  One carbohydrate serving of pasta is ? cup  One cup of pasta will equal 3 carbohydrate servings  An 8-ounce glass of milk will count as 1 carbohydrate serving  These amounts of food would equal 4 carbohydrate servings  One cup of tossed salad does not count toward your carbohydrate servings  • Count carbohydrate amounts using food labels:  Find the total amount of carbohydrate in a packaged food by reading the food label  Food labels tell you the serving size of the food and the total carbohydrate amount in each serving  Find the serving size on the food label and then decide how many servings you will eat  Multiply the number of servings you plan to eat by the carbohydrate amount per serving  ? Granola bar snack example: Your meal plan allows you to have 2 carbohydrate servings (30 grams) of carbohydrate for a snack  You plan to eat 1 package of granola bars, which contains 2 bars  According to the food label, the serving size of food in this package is 1 bar  Each serving (1 bar) contains 25 grams of carbohydrate  The total amount of carbohydrate in this package of granola bars would be 50 g  Based on your meal plan, you should eat only 1 bar  Follow up with your doctor as directed:  Write down your questions so you remember to ask them during your visits  © Copyright [employer] 2022 Information is for End User's use only and may not be sold, redistributed or otherwise used for commercial purposes  The above information is an  only  It is not intended as medical advice for individual conditions or treatments  Talk to your doctor, nurse or pharmacist before following any medical regimen to see if it is safe and effective for you

## 2023-04-02 ENCOUNTER — PATIENT MESSAGE (OUTPATIENT)
Dept: FAMILY MEDICINE CLINIC | Facility: CLINIC | Age: 68
End: 2023-04-02

## 2023-04-02 DIAGNOSIS — E11.00 TYPE 2 DIABETES MELLITUS WITH HYPEROSMOLARITY WITHOUT COMA, WITHOUT LONG-TERM CURRENT USE OF INSULIN (HCC): Primary | ICD-10-CM

## 2023-04-03 DIAGNOSIS — E11.00 TYPE 2 DIABETES MELLITUS WITH HYPEROSMOLARITY WITHOUT COMA, WITHOUT LONG-TERM CURRENT USE OF INSULIN (HCC): ICD-10-CM

## 2023-04-03 RX ORDER — SEMAGLUTIDE 1.34 MG/ML
1 INJECTION, SOLUTION SUBCUTANEOUS
Qty: 3 ML | Refills: 6 | Status: SHIPPED | OUTPATIENT
Start: 2023-04-03 | End: 2023-04-03 | Stop reason: SDUPTHER

## 2023-04-04 RX ORDER — SEMAGLUTIDE 1.34 MG/ML
1 INJECTION, SOLUTION SUBCUTANEOUS
Qty: 3 ML | Refills: 0 | Status: SHIPPED | OUTPATIENT
Start: 2023-04-04 | End: 2023-04-05 | Stop reason: SDUPTHER

## 2023-04-05 DIAGNOSIS — E11.00 TYPE 2 DIABETES MELLITUS WITH HYPEROSMOLARITY WITHOUT COMA, WITHOUT LONG-TERM CURRENT USE OF INSULIN (HCC): ICD-10-CM

## 2023-04-05 RX ORDER — SEMAGLUTIDE 1.34 MG/ML
1 INJECTION, SOLUTION SUBCUTANEOUS
Qty: 12 ML | Refills: 0 | Status: SHIPPED | OUTPATIENT
Start: 2023-04-05

## 2023-05-08 ENCOUNTER — TELEPHONE (OUTPATIENT)
Dept: FAMILY MEDICINE CLINIC | Facility: CLINIC | Age: 68
End: 2023-05-08

## 2023-05-08 NOTE — TELEPHONE ENCOUNTER
Prior Authorization for Ozempic, medication is on formulary and dose not require a prior Authorization

## 2023-05-13 ENCOUNTER — APPOINTMENT (OUTPATIENT)
Dept: LAB | Facility: HOSPITAL | Age: 68
End: 2023-05-13

## 2023-05-13 DIAGNOSIS — E11.51 TYPE 2 DIABETES MELLITUS WITH DIABETIC PERIPHERAL ANGIOPATHY WITHOUT GANGRENE, WITHOUT LONG-TERM CURRENT USE OF INSULIN (HCC): ICD-10-CM

## 2023-05-13 LAB
ANION GAP SERPL CALCULATED.3IONS-SCNC: 10 MMOL/L (ref 4–13)
BUN SERPL-MCNC: 20 MG/DL (ref 5–25)
CALCIUM SERPL-MCNC: 9.4 MG/DL (ref 8.4–10.2)
CHLORIDE SERPL-SCNC: 104 MMOL/L (ref 96–108)
CO2 SERPL-SCNC: 24 MMOL/L (ref 21–32)
CREAT SERPL-MCNC: 0.88 MG/DL (ref 0.6–1.3)
GFR SERPL CREATININE-BSD FRML MDRD: 89 ML/MIN/1.73SQ M
GLUCOSE P FAST SERPL-MCNC: 183 MG/DL (ref 65–99)
POTASSIUM SERPL-SCNC: 3.6 MMOL/L (ref 3.5–5.3)
SODIUM SERPL-SCNC: 138 MMOL/L (ref 135–147)

## 2023-05-17 DIAGNOSIS — I10 ESSENTIAL HYPERTENSION: ICD-10-CM

## 2023-05-17 RX ORDER — CARVEDILOL 25 MG/1
25 TABLET ORAL 2 TIMES DAILY WITH MEALS
Qty: 90 TABLET | Refills: 0 | Status: SHIPPED | OUTPATIENT
Start: 2023-05-17

## 2023-05-24 ENCOUNTER — APPOINTMENT (OUTPATIENT)
Dept: LAB | Facility: HOSPITAL | Age: 68
End: 2023-05-24

## 2023-05-24 DIAGNOSIS — Z00.8 HEALTH EXAMINATION IN POPULATION SURVEY: ICD-10-CM

## 2023-05-24 LAB
CHOLEST SERPL-MCNC: 134 MG/DL
EST. AVERAGE GLUCOSE BLD GHB EST-MCNC: 169 MG/DL
HBA1C MFR BLD: 7.5 %
HDLC SERPL-MCNC: 47 MG/DL
LDLC SERPL CALC-MCNC: 72 MG/DL (ref 0–100)
NONHDLC SERPL-MCNC: 87 MG/DL
TRIGL SERPL-MCNC: 73 MG/DL

## 2023-05-28 DIAGNOSIS — E11.00 TYPE 2 DIABETES MELLITUS WITH HYPEROSMOLARITY WITHOUT COMA, WITHOUT LONG-TERM CURRENT USE OF INSULIN (HCC): ICD-10-CM

## 2023-05-30 RX ORDER — SEMAGLUTIDE 1.34 MG/ML
1 INJECTION, SOLUTION SUBCUTANEOUS
Qty: 12 ML | Refills: 0 | Status: SHIPPED | OUTPATIENT
Start: 2023-05-30

## 2023-06-23 ENCOUNTER — OFFICE VISIT (OUTPATIENT)
Dept: FAMILY MEDICINE CLINIC | Facility: CLINIC | Age: 68
End: 2023-06-23
Payer: COMMERCIAL

## 2023-06-23 VITALS
BODY MASS INDEX: 31.42 KG/M2 | HEART RATE: 83 BPM | HEIGHT: 67 IN | SYSTOLIC BLOOD PRESSURE: 156 MMHG | OXYGEN SATURATION: 96 % | WEIGHT: 200.2 LBS | DIASTOLIC BLOOD PRESSURE: 74 MMHG

## 2023-06-23 DIAGNOSIS — F41.9 ANXIETY: ICD-10-CM

## 2023-06-23 DIAGNOSIS — R20.0 NUMBNESS OF FOOT: ICD-10-CM

## 2023-06-23 DIAGNOSIS — M75.82 TENDINITIS OF LEFT ROTATOR CUFF: ICD-10-CM

## 2023-06-23 DIAGNOSIS — Z12.11 SCREENING FOR COLON CANCER: ICD-10-CM

## 2023-06-23 DIAGNOSIS — I10 WHITE COAT SYNDROME WITH DIAGNOSIS OF HYPERTENSION: ICD-10-CM

## 2023-06-23 DIAGNOSIS — E11.51 TYPE 2 DIABETES MELLITUS WITH DIABETIC PERIPHERAL ANGIOPATHY WITHOUT GANGRENE, WITHOUT LONG-TERM CURRENT USE OF INSULIN (HCC): Primary | ICD-10-CM

## 2023-06-23 DIAGNOSIS — K64.8 OTHER HEMORRHOIDS: ICD-10-CM

## 2023-06-23 DIAGNOSIS — I10 PRIMARY HYPERTENSION: ICD-10-CM

## 2023-06-23 DIAGNOSIS — K21.9 GASTROESOPHAGEAL REFLUX DISEASE WITHOUT ESOPHAGITIS: ICD-10-CM

## 2023-06-23 PROCEDURE — 99214 OFFICE O/P EST MOD 30 MIN: CPT | Performed by: NURSE PRACTITIONER

## 2023-06-23 NOTE — PROGRESS NOTES
Name: Kevin Uribe      : 1955      MRN: [id number]  Encounter Provider: ISIDRO Salcido  Encounter Date: 2023   Encounter department: [address] [place] [address] PRIMARY CARE    Assessment & Plan     1  Type 2 diabetes mellitus with diabetic peripheral angiopathy without gangrene, without long-term current use of insulin [facility])  Assessment & Plan:    Lab Results   Component Value Date    HGBA1C 7 5 (H) 2023     Not due for HA1C today  Foot exam done today  Some numbness in left foot that is ongoing but not due to diabetes  Orders:  -     Comprehensive metabolic panel; Future; Expected date: 2023    2  Numbness of foot    3  Screening for colon cancer  -     Ambulatory Referral to Gastroenterology; Future    4  Anxiety  Assessment & Plan:  Reports some increased anxiety - sister-in-law is on hospice and is dying - unsure if it will occur in the next few days and they are leaving for vacation tomorrow  5  Tendinitis of left rotator cuff  Assessment & Plan:  Pain controlled, improved from past events  6  White coat syndrome with diagnosis of hypertension  Assessment & Plan:  BP elevated today  7  Primary hypertension  Assessment & Plan:  White coat HTN/syndrome  On Carvediolol BID and HCTZ      8  Gastroesophageal reflux disease without esophagitis  Assessment & Plan:  Controlled  9  Other hemorrhoids  Assessment & Plan:  Reports occasional bleeding with BM's  Referral for colonoscopy placed  Subjective      Here today for a follow-up  Hx of T2DM - last HA1C below 8  Reports no new issues  Is due for his next colonoscopy  Hx of colon polyps and hemorrhoids  Reports some rectal bleeding with more difficulty BM's  Hx of constipation at times  Review of Systems   Constitutional: Negative for chills and fever  HENT: Negative for ear pain and sore throat  Eyes: Negative for pain and visual disturbance     Respiratory: "Negative for cough and shortness of breath  Cardiovascular: Negative for chest pain and palpitations  Gastrointestinal: Negative for abdominal pain and vomiting  Genitourinary: Negative for dysuria and hematuria  Musculoskeletal: Negative for arthralgias and back pain  Skin: Negative for color change and rash  Neurological: Negative for seizures and syncope  All other systems reviewed and are negative  Current Outpatient Medications on File Prior to Visit   Medication Sig   • aspirin (ECOTRIN LOW STRENGTH) 81 mg EC tablet Take 81 mg by mouth daily Pt stopped on May 2 for surgery on 5/6   • atorvastatin (LIPITOR) 80 mg tablet Take 1 tablet (80 mg total) by mouth daily   • carvedilol (COREG) 25 mg tablet Take 1 tablet (25 mg total) by mouth 2 (two) times a day with meals   • citalopram (CeleXA) 10 mg tablet Take 1 tablet (10 mg total) by mouth daily   • Coenzyme Q10 (CoQ-10) 100 MG CAPS Take 100 mg by mouth daily    • esomeprazole (NexIUM) 5 MG packet Take 5 mg by mouth daily   • Farxiga 10 MG tablet Take 1 tablet (10 mg total) by mouth daily   • hydrochlorothiazide (HYDRODIURIL) 25 mg tablet Take 1 tablet (25 mg total) by mouth daily   • hydrocortisone (ANUSOL-HC) 2 5 % rectal cream Apply topically 2 (two) times a day   • Multiple Vitamins-Minerals (PreserVision AREDS 2) CAPS Take 1 capsule by mouth daily    • Ozempic, 1 MG/DOSE, 4 MG/3ML injection pen Inject 0 75 mL (1 mg total) under the skin every 7 days   • ramipril (ALTACE) 10 MG capsule Take 1 capsule (10 mg total) by mouth daily   • [DISCONTINUED] nystatin (MYCOSTATIN) cream Apply topically 2 (two) times a day       Objective     /74 (BP Location: Left arm, Patient Position: Sitting, Cuff Size: Large)   Pulse 83   Ht 5' 7\" (1 702 m)   Wt 90 8 kg (200 lb 3 2 oz)   SpO2 96%   BMI 31 36 kg/m²     Physical Exam  Vitals and nursing note reviewed  Constitutional:       Appearance: Normal appearance     HENT:      Head: Normocephalic " and atraumatic  Eyes:      Conjunctiva/sclera: Conjunctivae normal    Cardiovascular:      Rate and Rhythm: Normal rate and regular rhythm  Pulses: no weak pulses     Heart sounds: Normal heart sounds  No murmur heard  No gallop  Pulmonary:      Effort: Pulmonary effort is normal  No respiratory distress  Breath sounds: Normal breath sounds  No wheezing or rales  Abdominal:      General: Abdomen is flat  Musculoskeletal:      Cervical back: Neck supple  Feet:      Right foot:      Skin integrity: No ulcer, skin breakdown, erythema, warmth, callus or dry skin  Left foot:      Skin integrity: No ulcer, skin breakdown, erythema, warmth, callus or dry skin  Neurological:      General: No focal deficit present  Mental Status: He is alert and oriented to person, place, and time  Mental status is at baseline  Cranial Nerves: No cranial nerve deficit  Psychiatric:         Mood and Affect: Mood normal          Behavior: Behavior normal          Thought Content: Thought content normal          Judgment: Judgment normal           Diabetic Foot Exam    Patient's shoes and socks removed  Right Foot/Ankle   Right Foot Inspection  Skin Exam: skin normal and skin intact  No dry skin, no warmth, no callus, no erythema, no maceration, no abnormal color, no pre-ulcer, no ulcer and no callus  Toe Exam: ROM and strength within normal limits  Left Foot/Ankle  Left Foot Inspection  Skin Exam: skin normal and skin intact  No dry skin, no warmth, no erythema, no maceration, normal color, no pre-ulcer, no ulcer and no callus  Toe Exam: ROM and strength within normal limits       Assign Risk Category  No deformity present  No loss of protective sensation  No weak pulses  Risk: 0    ISIDRO Garvin

## 2023-06-23 NOTE — ASSESSMENT & PLAN NOTE
Lab Results   Component Value Date    HGBA1C 7 5 (H) 05/24/2023     Not due for HA1C today  Foot exam done today  Some numbness in left foot that is ongoing but not due to diabetes

## 2023-06-23 NOTE — ASSESSMENT & PLAN NOTE
Reports some increased anxiety - sister-in-law is on hospice and is dying - unsure if it will occur in the next few days and they are leaving for vacation tomorrow

## 2023-06-23 NOTE — ASSESSMENT & PLAN NOTE
Most recent Lipid Panel: 05/24/2023    Hx of HLD: Yes    On Statin: Yes    Plan: Continue with statin, latest Lipid panel controlled

## 2023-07-02 DIAGNOSIS — E11.00 TYPE 2 DIABETES MELLITUS WITH HYPEROSMOLARITY WITHOUT COMA, WITHOUT LONG-TERM CURRENT USE OF INSULIN (HCC): ICD-10-CM

## 2023-07-03 RX ORDER — SEMAGLUTIDE 1.34 MG/ML
1 INJECTION, SOLUTION SUBCUTANEOUS
Qty: 12 ML | Refills: 0 | Status: SHIPPED | OUTPATIENT
Start: 2023-07-03

## 2023-07-06 ENCOUNTER — PREP FOR PROCEDURE (OUTPATIENT)
Age: 68
End: 2023-07-06

## 2023-07-06 ENCOUNTER — TELEPHONE (OUTPATIENT)
Age: 68
End: 2023-07-06

## 2023-07-06 DIAGNOSIS — Z12.11 SCREENING FOR COLON CANCER: Primary | ICD-10-CM

## 2023-07-06 NOTE — TELEPHONE ENCOUNTER
Scheduled date of colonoscopy (as of today): 08/08/2023    Physician performing colonoscopy: Dr. Isabella Wade    Location of colonoscopy: [facility]    Clearances: N/A    Golytely instructions sent via [address] [place] [address].

## 2023-07-10 DIAGNOSIS — Z12.11 SCREENING FOR COLON CANCER: Primary | ICD-10-CM

## 2023-07-11 DIAGNOSIS — I10 ESSENTIAL HYPERTENSION: ICD-10-CM

## 2023-07-11 RX ORDER — CARVEDILOL 25 MG/1
25 TABLET ORAL 2 TIMES DAILY WITH MEALS
Qty: 90 TABLET | Refills: 0 | Status: CANCELLED | OUTPATIENT
Start: 2023-07-11

## 2023-07-12 DIAGNOSIS — I10 ESSENTIAL HYPERTENSION: ICD-10-CM

## 2023-07-12 RX ORDER — CARVEDILOL 25 MG/1
25 TABLET ORAL 2 TIMES DAILY WITH MEALS
Qty: 60 TABLET | Refills: 0 | Status: SHIPPED | OUTPATIENT
Start: 2023-07-12 | End: 2023-07-12 | Stop reason: SDUPTHER

## 2023-07-12 RX ORDER — CARVEDILOL 25 MG/1
25 TABLET ORAL 2 TIMES DAILY WITH MEALS
Qty: 180 TABLET | Refills: 3 | Status: SHIPPED | OUTPATIENT
Start: 2023-07-12 | End: 2023-07-13 | Stop reason: SDUPTHER

## 2023-07-13 DIAGNOSIS — I10 ESSENTIAL HYPERTENSION: ICD-10-CM

## 2023-07-13 RX ORDER — CARVEDILOL 25 MG/1
25 TABLET ORAL 2 TIMES DAILY WITH MEALS
Qty: 180 TABLET | Refills: 3 | Status: SHIPPED | OUTPATIENT
Start: 2023-07-13

## 2023-07-19 DIAGNOSIS — E11.9 TYPE 2 DIABETES MELLITUS WITHOUT COMPLICATION, WITHOUT LONG-TERM CURRENT USE OF INSULIN (HCC): ICD-10-CM

## 2023-07-19 DIAGNOSIS — I10 ESSENTIAL HYPERTENSION: ICD-10-CM

## 2023-07-19 RX ORDER — HYDROCHLOROTHIAZIDE 25 MG/1
25 TABLET ORAL DAILY
Qty: 90 TABLET | Refills: 0 | Status: SHIPPED | OUTPATIENT
Start: 2023-07-19

## 2023-07-19 RX ORDER — RAMIPRIL 10 MG/1
10 CAPSULE ORAL DAILY
Qty: 90 CAPSULE | Refills: 0 | Status: SHIPPED | OUTPATIENT
Start: 2023-07-19

## 2023-07-19 RX ORDER — DAPAGLIFLOZIN 10 MG/1
10 TABLET, FILM COATED ORAL DAILY
Qty: 90 TABLET | Refills: 0 | Status: SHIPPED | OUTPATIENT
Start: 2023-07-19

## 2023-07-30 DIAGNOSIS — E11.00 TYPE 2 DIABETES MELLITUS WITH HYPEROSMOLARITY WITHOUT COMA, WITHOUT LONG-TERM CURRENT USE OF INSULIN (HCC): ICD-10-CM

## 2023-07-31 RX ORDER — SEMAGLUTIDE 1.34 MG/ML
1 INJECTION, SOLUTION SUBCUTANEOUS
Qty: 12 ML | Refills: 0 | Status: SHIPPED | OUTPATIENT
Start: 2023-07-31 | End: 2023-09-03 | Stop reason: SDUPTHER

## 2023-08-08 ENCOUNTER — ANESTHESIA EVENT (OUTPATIENT)
Dept: PERIOP | Facility: HOSPITAL | Age: 68
End: 2023-08-08

## 2023-08-08 ENCOUNTER — TELEPHONE (OUTPATIENT)
Dept: GASTROENTEROLOGY | Facility: CLINIC | Age: 68
End: 2023-08-08

## 2023-08-08 ENCOUNTER — HOSPITAL ENCOUNTER (OUTPATIENT)
Dept: PERIOP | Facility: HOSPITAL | Age: 68
Setting detail: OUTPATIENT SURGERY
Discharge: HOME | End: 2023-08-08
Attending: STUDENT IN AN ORGANIZED HEALTH CARE EDUCATION/TRAINING PROGRAM | Admitting: STUDENT IN AN ORGANIZED HEALTH CARE EDUCATION/TRAINING PROGRAM
Payer: COMMERCIAL

## 2023-08-08 ENCOUNTER — PREP FOR PROCEDURE (OUTPATIENT)
Dept: GASTROENTEROLOGY | Facility: CLINIC | Age: 68
End: 2023-08-08

## 2023-08-08 ENCOUNTER — ANESTHESIA (OUTPATIENT)
Dept: PERIOP | Facility: HOSPITAL | Age: 68
End: 2023-08-08

## 2023-08-08 VITALS
SYSTOLIC BLOOD PRESSURE: 106 MMHG | WEIGHT: 198 LBS | HEART RATE: 91 BPM | HEIGHT: 67 IN | RESPIRATION RATE: 18 BRPM | OXYGEN SATURATION: 97 % | TEMPERATURE: 97.2 F | BODY MASS INDEX: 31.08 KG/M2 | DIASTOLIC BLOOD PRESSURE: 60 MMHG

## 2023-08-08 DIAGNOSIS — Z12.11 SCREENING FOR COLON CANCER: ICD-10-CM

## 2023-08-08 DIAGNOSIS — Z86.010 HISTORY OF COLON POLYPS: Primary | ICD-10-CM

## 2023-08-08 LAB — GLUCOSE SERPL-MCNC: 150 MG/DL (ref 65–140)

## 2023-08-08 PROCEDURE — 88305 TISSUE EXAM BY PATHOLOGIST: CPT | Performed by: PATHOLOGY

## 2023-08-08 PROCEDURE — 82948 REAGENT STRIP/BLOOD GLUCOSE: CPT

## 2023-08-08 RX ORDER — MIDAZOLAM HYDROCHLORIDE 2 MG/2ML
INJECTION, SOLUTION INTRAMUSCULAR; INTRAVENOUS AS NEEDED
Status: DISCONTINUED | OUTPATIENT
Start: 2023-08-08 | End: 2023-08-08

## 2023-08-08 RX ORDER — ONDANSETRON 2 MG/ML
INJECTION INTRAMUSCULAR; INTRAVENOUS AS NEEDED
Status: DISCONTINUED | OUTPATIENT
Start: 2023-08-08 | End: 2023-08-08

## 2023-08-08 RX ORDER — ONDANSETRON 2 MG/ML
4 INJECTION INTRAMUSCULAR; INTRAVENOUS ONCE AS NEEDED
Status: DISCONTINUED | OUTPATIENT
Start: 2023-08-08 | End: 2023-08-12 | Stop reason: HOSPADM

## 2023-08-08 RX ORDER — KETAMINE HCL IN NACL, ISO-OSM 100MG/10ML
SYRINGE (ML) INJECTION AS NEEDED
Status: DISCONTINUED | OUTPATIENT
Start: 2023-08-08 | End: 2023-08-08

## 2023-08-08 RX ORDER — FENTANYL CITRATE 50 UG/ML
INJECTION, SOLUTION INTRAMUSCULAR; INTRAVENOUS AS NEEDED
Status: DISCONTINUED | OUTPATIENT
Start: 2023-08-08 | End: 2023-08-08

## 2023-08-08 RX ORDER — DEXMEDETOMIDINE HYDROCHLORIDE 100 UG/ML
INJECTION, SOLUTION INTRAVENOUS AS NEEDED
Status: DISCONTINUED | OUTPATIENT
Start: 2023-08-08 | End: 2023-08-08

## 2023-08-08 RX ORDER — ALBUTEROL SULFATE 2.5 MG/3ML
2.5 SOLUTION RESPIRATORY (INHALATION) ONCE AS NEEDED
Status: DISCONTINUED | OUTPATIENT
Start: 2023-08-08 | End: 2023-08-12 | Stop reason: HOSPADM

## 2023-08-08 RX ORDER — SODIUM CHLORIDE, SODIUM LACTATE, POTASSIUM CHLORIDE, CALCIUM CHLORIDE 600; 310; 30; 20 MG/100ML; MG/100ML; MG/100ML; MG/100ML
125 INJECTION, SOLUTION INTRAVENOUS CONTINUOUS
Status: CANCELLED | OUTPATIENT
Start: 2023-08-08

## 2023-08-08 RX ORDER — SODIUM CHLORIDE, SODIUM LACTATE, POTASSIUM CHLORIDE, CALCIUM CHLORIDE 600; 310; 30; 20 MG/100ML; MG/100ML; MG/100ML; MG/100ML
75 INJECTION, SOLUTION INTRAVENOUS CONTINUOUS
Status: DISCONTINUED | OUTPATIENT
Start: 2023-08-08 | End: 2023-08-12 | Stop reason: HOSPADM

## 2023-08-08 RX ADMIN — DEXMEDETOMIDINE HCL 8 MCG: 100 INJECTION INTRAVENOUS at 08:12

## 2023-08-08 RX ADMIN — DEXMEDETOMIDINE HCL 8 MCG: 100 INJECTION INTRAVENOUS at 08:20

## 2023-08-08 RX ADMIN — DEXMEDETOMIDINE HCL 8 MCG: 100 INJECTION INTRAVENOUS at 08:26

## 2023-08-08 RX ADMIN — FENTANYL CITRATE 25 MCG: 50 INJECTION, SOLUTION INTRAMUSCULAR; INTRAVENOUS at 08:00

## 2023-08-08 RX ADMIN — MIDAZOLAM 2 MG: 1 INJECTION INTRAMUSCULAR; INTRAVENOUS at 07:53

## 2023-08-08 RX ADMIN — FENTANYL CITRATE 50 MCG: 50 INJECTION, SOLUTION INTRAMUSCULAR; INTRAVENOUS at 07:53

## 2023-08-08 RX ADMIN — METHYLENE BLUE: 5 INJECTION INTRAVENOUS at 08:11

## 2023-08-08 RX ADMIN — Medication 30 MG: at 07:53

## 2023-08-08 RX ADMIN — DEXMEDETOMIDINE HCL 12 MCG: 100 INJECTION INTRAVENOUS at 07:56

## 2023-08-08 RX ADMIN — SODIUM CHLORIDE, SODIUM LACTATE, POTASSIUM CHLORIDE, AND CALCIUM CHLORIDE 75 ML/HR: .6; .31; .03; .02 INJECTION, SOLUTION INTRAVENOUS at 07:40

## 2023-08-08 RX ADMIN — DEXMEDETOMIDINE HCL 12 MCG: 100 INJECTION INTRAVENOUS at 07:53

## 2023-08-08 RX ADMIN — ONDANSETRON 4 MG: 2 INJECTION INTRAMUSCULAR; INTRAVENOUS at 08:10

## 2023-08-08 RX ADMIN — Medication 20 MG: at 07:56

## 2023-08-08 RX ADMIN — FENTANYL CITRATE 25 MCG: 50 INJECTION, SOLUTION INTRAMUSCULAR; INTRAVENOUS at 07:56

## 2023-08-08 RX ADMIN — DEXMEDETOMIDINE HCL 12 MCG: 100 INJECTION INTRAVENOUS at 08:00

## 2023-08-08 RX ADMIN — DEXMEDETOMIDINE HCL 8 MCG: 100 INJECTION INTRAVENOUS at 08:05

## 2023-08-08 RX ADMIN — DEXMEDETOMIDINE HCL 8 MCG: 100 INJECTION INTRAVENOUS at 08:29

## 2023-08-08 NOTE — TELEPHONE ENCOUNTER
When I called the patient back he stated that his urine is green in Color he wants to know if that is normal after having a Colonoscopy?      You can reach the patient back @ [phone]

## 2023-08-08 NOTE — TELEPHONE ENCOUNTER
----- Message from Chloé Leggett DO sent at 8/8/2023 12:04 PM EDT -----  Regarding: RE: Repeat colonoscopy in 6 months  I sent the Golytely script to his pharmacy. We can decide if he needs follow-up after the next colonoscopy. Thanks. Chloé Leggett D.O. [address] [facility]  Division of Gastroenterology & Hepatology  Available on [email or web address]    ----- Message -----  From: Jian Garcia MA  Sent: 8/8/2023  12:01 PM EDT  To: Chloé Leggett DO  Subject: RE: Repeat colonoscopy in 6 months               I did schedule this patient  for 2/27/2023 with Dr. Kristi Villar, he still has the instructions for the [facility] prep. Could you send in the Golytely to his Pharmacy? Do you want a follow up appointment after the procedure?     ----- Message -----  From: Chloé Leggett DO  Sent: 8/8/2023   8:56 AM EDT  To: [facility]  Subject: Repeat colonoscopy in 6 months                   Hi    Please schedule repeat colonoscopy in 6 months for piecemeal polypectomy. Thank you. Chloé Leggett D.O. [address] [facility]  Division of Gastroenterology & Hepatology  Available on [email or web address]

## 2023-08-08 NOTE — ANESTHESIA POSTPROCEDURE EVALUATION
Post-Op Assessment Note    CV Status:  Stable    Pain management: adequate     Mental Status:  Alert and awake   Hydration Status:  Euvolemic   PONV Controlled:  Controlled   Airway Patency:  Patent      Post Op Vitals Reviewed: Yes      Staff: CRNA         No notable events documented.     BP   104/55   Temp   97.3   Pulse  66   Resp   18   SpO2   98%

## 2023-08-08 NOTE — ANESTHESIA PREPROCEDURE EVALUATION
Procedure:  COLONOSCOPY    Relevant Problems   CARDIO   (+) Coronary artery disease   (+) High cholesterol   (+) Hypertension   (+) White coat syndrome with diagnosis of hypertension      GI/HEPATIC   (+) Gastroesophageal reflux disease without esophagitis      NEURO/PSYCH   (+) Anxiety   (+) Chronic left shoulder pain    Difficulty airway history- noted hernandez intubation    Soybean allergy, anaphylaxis treatment. Propofol not on most recent anesthesia record. Physical Exam    Airway    Mallampati score: III  TM Distance: >3 FB  Neck ROM: full     Dental   No notable dental hx     Cardiovascular  Cardiovascular exam normal    Pulmonary  Pulmonary exam normal     Other Findings        Anesthesia Plan  ASA Score- 3     Anesthesia Type- IV sedation with anesthesia with ASA Monitors. Additional Monitors:   Airway Plan:     Comment: Plan for midaz/fent/ketamine/precedex instead of propofol given allergy. Advised about likely longer PACU time and "hangover" from medications. Advised allergy follow up for identification +/- desensitization. .       Plan Factors-Exercise tolerance (METS): >4 METS. Chart reviewed. EKG reviewed. Imaging results reviewed. Existing labs reviewed. Patient summary reviewed. Patient is not a current smoker. Induction- intravenous. Postoperative Plan-     Informed Consent- Anesthetic plan and risks discussed with patient. I personally reviewed this patient with the CRNA. Discussed and agreed on the Anesthesia Plan with the CRNA. Loreta Haley

## 2023-08-08 NOTE — TELEPHONE ENCOUNTER
I called the patient and let him know that  Dr. Baylee Wilson sent the  Golytely to his Pharmacy he expressed understanding.

## 2023-08-08 NOTE — H&P
Raymond Mcmahon [facility]  History & Physical     PATIENT INFO     Name: Tyler Beltrán  YOB: 1955   Age: 79 y.o. Sex: male   MRN: [id number]     HISTORY OF PRESENT ILLNESS     Tyler Beltrán is a 79y.o. year old male who presents for colonoscopy for colon cancer screening. Last colonoscopy in 06/2013 with subcentimeter polyp. History of SSA. Not on antiplatelets or anticoagulants. REVIEW OF SYSTEMS     Per the HPI, and otherwise unremarkable. Historical Information   Past Medical History:   Diagnosis Date   • Allergic Seasonal   • Anesthesia     Pt reports that he became agitated after IV sedation for a colonoscopy   • Coronary artery disease 08/2014   • COVID-19 01/2021    Pt reports fever, fatigue, nausea and weakness. Pt denies breathing issues. Symptoms lasted 2 weeks. • Diabetes mellitus ([address])    • GERD (gastroesophageal reflux disease)    • Hard to intubate     Pt received a letter stating that it was hard to visualize vocal chords   • Heart attack [facility])    • High cholesterol    • Hypertension    • Myocardial infarction [facility]) 08/2014    Stent   • Wears glasses      Past Surgical History:   Procedure Laterality Date   • CARDIAC CATHETERIZATION  08/19/2014    Pt reports having one stent placed. • CHOLECYSTECTOMY     • COLONOSCOPY     • SC SURGICAL ARTHROSCOPY SHOULDER XTNSV DBRDMT 3+ Left 5/6/2021    Procedure: ARTHROSCOPY SHOULDER , debridement; open rotator cuff repair;  Surgeon: Jose Landrum;   Location:  MAIN OR;  Service: Orthopedics   • VASECTOMY     • VASECTOMY REVERSAL       Social History   Social History     Substance and Sexual Activity   Alcohol Use Yes   • Alcohol/week: 5.0 standard drinks of alcohol   • Types: 5 Cans of beer per week    Comment: occasionally     Social History     Substance and Sexual Activity   Drug Use Never     Social History     Tobacco Use   Smoking Status Former   • Packs/day: 0.00   • Years: 30.00   • Total pack years: 0.00 • Types: Cigarettes   • Quit date: 2015   • Years since quittin.4   Smokeless Tobacco Never     Family History   Problem Relation Age of Onset   • Dementia Mother    • No Known Problems Father         MEDICATIONS & ALLERGIES     Current Outpatient Medications   Medication Instructions   • aspirin (ECOTRIN LOW STRENGTH) 81 mg, Oral, Daily, Pt stopped on May 2 for surgery on    • atorvastatin (LIPITOR) 80 mg, Oral, Daily   • carvedilol (COREG) 25 mg, Oral, 2 times daily with meals   • citalopram (CELEXA) 10 mg, Oral, Daily   • CoQ-10 100 mg, Oral, Daily   • Farxiga 10 mg, Oral, Daily   • hydrochlorothiazide (HYDRODIURIL) 25 mg, Oral, Daily   • hydrocortisone (ANUSOL-HC) 2.5 % rectal cream Topical, 2 times daily   • Multiple Vitamins-Minerals (PreserVision AREDS 2) CAPS 1 capsule, Oral, Daily   • NexIUM 5 mg, Oral, Daily   • Ozempic (1 MG/DOSE) 1 mg, Subcutaneous, Every 7 days   • polyethylene glycol (GOLYTELY) 4000 mL solution Take as directed by office. 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 Pt reports having allergy shots at that time. PHYSICAL EXAM      Objective   Blood pressure 145/85, pulse 90, temperature 97.9 °F (36.6 °C), temperature source Tympanic, resp. rate 18, height 5' 7" (1.702 m), weight 89.8 kg (198 lb), SpO2 95 %. Body mass index is 31.01 kg/m². General Appearance:   Alert, cooperative, no distress   Lungs:   Equal chest rise, respirations unlabored    Heart:   Regular rate and rhythm   Abdomen:   Soft, non-tender, non-distended; normal bowel sounds; no masses, no organomegaly    Extremities:   No edema       ASSESSMENT & PLAN     This is a 79y.o. year old male here for screening colonoscopy, and he is stable and optimized for his procedure.       Shameka Smith, KARINA.  [address] [facility]  Division of Gastroenterology & Hepatology  Available on [email or web address]    ** Please Note: This note is constructed using a voice recognition dictation system.  **

## 2023-08-08 NOTE — TELEPHONE ENCOUNTER
I called the patient back and talked to him about green urine, he stated that it is clearing up and he has no other symptoms, if it get any worse he will call his PCP. He expressed understanding.

## 2023-08-08 NOTE — TELEPHONE ENCOUNTER
----- Message from Mathew Godfrey DO sent at 8/8/2023  8:56 AM EDT -----  Regarding: Repeat colonoscopy in 6 months  Hi    Please schedule repeat colonoscopy in 6 months for piecemeal polypectomy. Thank you. Mathew Godfrey D.O. [address] [facility]  Division of Gastroenterology & Hepatology  Available on [email or web address]

## 2023-08-08 NOTE — TELEPHONE ENCOUNTER
I called the patient and scheduled him for a 6 month repeat Colonoscopy on 2/27/2023 with Dr. Erlinda Cooper @ [address] [place] [address]. Patient expressed understanding.

## 2023-08-09 ENCOUNTER — NURSE TRIAGE (OUTPATIENT)
Age: 68
End: 2023-08-09

## 2023-08-09 NOTE — TELEPHONE ENCOUNTER
Spoke with patient, reviewed provider recommendations. Patient verbalized understanding, no further questions.

## 2023-08-09 NOTE — TELEPHONE ENCOUNTER
Please advise    Patient calling in, reports he has colonoscopy done yesterday and noticed his urine has a green tint. He is wondering if this is from the methylene blue injected into the submucosa. He explained it cleared up over night but then this morning he noticed it was green color again. Denies: pain, fevers, lightheadedness, dizziness, SOB, frequency or urgency.

## 2023-08-11 PROCEDURE — 88305 TISSUE EXAM BY PATHOLOGIST: CPT | Performed by: PATHOLOGY

## 2023-09-03 DIAGNOSIS — E11.00 TYPE 2 DIABETES MELLITUS WITH HYPEROSMOLARITY WITHOUT COMA, WITHOUT LONG-TERM CURRENT USE OF INSULIN (HCC): ICD-10-CM

## 2023-09-03 RX ORDER — SEMAGLUTIDE 1.34 MG/ML
1 INJECTION, SOLUTION SUBCUTANEOUS
Qty: 12 ML | Refills: 0 | Status: SHIPPED | OUTPATIENT
Start: 2023-09-03

## 2023-09-19 DIAGNOSIS — F41.9 ANXIETY: ICD-10-CM

## 2023-09-19 DIAGNOSIS — E78.00 HIGH CHOLESTEROL: ICD-10-CM

## 2023-09-19 RX ORDER — ATORVASTATIN CALCIUM 80 MG/1
80 TABLET, FILM COATED ORAL DAILY
Qty: 90 TABLET | Refills: 0 | Status: SHIPPED | OUTPATIENT
Start: 2023-09-19

## 2023-09-19 RX ORDER — CITALOPRAM HYDROBROMIDE 10 MG/1
10 TABLET ORAL DAILY
Qty: 90 TABLET | Refills: 0 | Status: SHIPPED | OUTPATIENT
Start: 2023-09-19

## 2023-09-24 DIAGNOSIS — E11.00 TYPE 2 DIABETES MELLITUS WITH HYPEROSMOLARITY WITHOUT COMA, WITHOUT LONG-TERM CURRENT USE OF INSULIN (HCC): ICD-10-CM

## 2023-09-25 RX ORDER — SEMAGLUTIDE 1.34 MG/ML
1 INJECTION, SOLUTION SUBCUTANEOUS
Qty: 12 ML | Refills: 0 | Status: SHIPPED | OUTPATIENT
Start: 2023-09-25

## 2023-09-27 NOTE — PROGRESS NOTES
Name: Nesha Zarco      : 1955      MRN: [id number]  Encounter Provider: Herbie Leon MD  Encounter Date: 2023   Encounter department: [address] [place] [address]     1. Gastroesophageal reflux disease without esophagitis    2. Type 2 diabetes mellitus without complication, without long-term current use of insulin (HCC)  -     POCT hemoglobin A1c    3. Anxiety    4. High cholesterol      BMI Counseling: There is no height or weight on file to calculate BMI. The BMI is above normal. Nutrition recommendations include moderation in carbohydrate intake. Rationale for BMI follow-up plan is due to patient being overweight or obese. Breana Gonzales is here for routine follow-up regarding diabetes as well as GERD and anxiety his GERD and anxiety are stable and well controlled his A1c is 7.8. He admits to not being very careful with his diet he was eating some ice cream etc. he is on Danna as well as Ozempic I really do not see the utility in increasing either at this time he can focus on diet exercise and weight loss.     Review of Systems    Current Outpatient Medications on File Prior to Visit   Medication Sig   • aspirin (ECOTRIN LOW STRENGTH) 81 mg EC tablet Take 81 mg by mouth daily Pt stopped on May 2 for surgery on    • atorvastatin (LIPITOR) 80 mg tablet Take 1 tablet (80 mg total) by mouth daily   • carvedilol (COREG) 25 mg tablet Take 1 tablet (25 mg total) by mouth 2 (two) times a day with meals   • citalopram (CeleXA) 10 mg tablet Take 1 tablet (10 mg total) by mouth daily   • Coenzyme Q10 (CoQ-10) 100 MG CAPS Take 100 mg by mouth daily    • esomeprazole (NexIUM) 5 MG packet Take 5 mg by mouth daily   • Farxiga 10 MG tablet Take 1 tablet (10 mg total) by mouth daily   • hydrochlorothiazide (HYDRODIURIL) 25 mg tablet Take 1 tablet (25 mg total) by mouth daily   • hydrocortisone (ANUSOL-HC) 2.5 % rectal cream Apply topically 2 (two) times a day   • Multiple Vitamins-Minerals (PreserVision AREDS 2) CAPS Take 1 capsule by mouth daily    • Ozempic, 1 MG/DOSE, 4 MG/3ML injection pen Inject 0.75 mL (1 mg total) under the skin every 7 days   • ramipril (ALTACE) 10 MG capsule Take 1 capsule (10 mg total) by mouth daily   • polyethylene glycol (GOLYTELY) 4000 mL solution Take 4,000 mL by mouth once for 1 dose Take as directed by office       Objective     /78   Pulse 88   Temp 97.6 °F (36.4 °C)   SpO2 98%     Physical Exam  Dusty Brock MD

## 2023-09-28 ENCOUNTER — OFFICE VISIT (OUTPATIENT)
Dept: FAMILY MEDICINE CLINIC | Facility: CLINIC | Age: 68
End: 2023-09-28
Payer: COMMERCIAL

## 2023-09-28 VITALS
BODY MASS INDEX: 31.8 KG/M2 | DIASTOLIC BLOOD PRESSURE: 78 MMHG | SYSTOLIC BLOOD PRESSURE: 124 MMHG | HEIGHT: 67 IN | TEMPERATURE: 97.6 F | WEIGHT: 202.6 LBS | HEART RATE: 88 BPM | OXYGEN SATURATION: 98 %

## 2023-09-28 DIAGNOSIS — E11.9 TYPE 2 DIABETES MELLITUS WITHOUT COMPLICATION, WITHOUT LONG-TERM CURRENT USE OF INSULIN (HCC): Primary | ICD-10-CM

## 2023-09-28 DIAGNOSIS — F41.9 ANXIETY: ICD-10-CM

## 2023-09-28 DIAGNOSIS — K21.9 GASTROESOPHAGEAL REFLUX DISEASE WITHOUT ESOPHAGITIS: ICD-10-CM

## 2023-09-28 DIAGNOSIS — E78.00 HIGH CHOLESTEROL: ICD-10-CM

## 2023-09-28 LAB — SL AMB POCT HEMOGLOBIN AIC: 7.8 (ref ?–6.5)

## 2023-09-28 PROCEDURE — 99214 OFFICE O/P EST MOD 30 MIN: CPT | Performed by: FAMILY MEDICINE

## 2023-09-28 PROCEDURE — 83036 HEMOGLOBIN GLYCOSYLATED A1C: CPT | Performed by: FAMILY MEDICINE

## 2023-09-28 NOTE — ASSESSMENT & PLAN NOTE
Continue meds  Lifestyle modifications  Lab Results   Component Value Date    HGBA1C 7.5 (H) 05/24/2023

## 2023-10-17 ENCOUNTER — VBI (OUTPATIENT)
Dept: ADMINISTRATIVE | Facility: OTHER | Age: 68
End: 2023-10-17

## 2023-10-17 LAB
LEFT EYE DIABETIC RETINOPATHY: NORMAL
RIGHT EYE DIABETIC RETINOPATHY: NORMAL

## 2023-10-22 DIAGNOSIS — E11.00 TYPE 2 DIABETES MELLITUS WITH HYPEROSMOLARITY WITHOUT COMA, WITHOUT LONG-TERM CURRENT USE OF INSULIN (HCC): ICD-10-CM

## 2023-10-22 RX ORDER — SEMAGLUTIDE 1.34 MG/ML
1 INJECTION, SOLUTION SUBCUTANEOUS
Qty: 12 ML | Refills: 0 | Status: SHIPPED | OUTPATIENT
Start: 2023-10-22

## 2023-10-31 DIAGNOSIS — I10 ESSENTIAL HYPERTENSION: ICD-10-CM

## 2023-10-31 DIAGNOSIS — E11.9 TYPE 2 DIABETES MELLITUS WITHOUT COMPLICATION, WITHOUT LONG-TERM CURRENT USE OF INSULIN (HCC): ICD-10-CM

## 2023-11-01 RX ORDER — DAPAGLIFLOZIN 10 MG/1
10 TABLET, FILM COATED ORAL DAILY
Qty: 90 TABLET | Refills: 3 | Status: SHIPPED | OUTPATIENT
Start: 2023-11-01

## 2023-11-01 RX ORDER — RAMIPRIL 10 MG/1
10 CAPSULE ORAL DAILY
Qty: 90 CAPSULE | Refills: 3 | Status: SHIPPED | OUTPATIENT
Start: 2023-11-01

## 2023-11-27 DIAGNOSIS — E11.00 TYPE 2 DIABETES MELLITUS WITH HYPEROSMOLARITY WITHOUT COMA, WITHOUT LONG-TERM CURRENT USE OF INSULIN (HCC): ICD-10-CM

## 2023-11-27 RX ORDER — SEMAGLUTIDE 1.34 MG/ML
1 INJECTION, SOLUTION SUBCUTANEOUS
Qty: 12 ML | Refills: 1 | Status: SHIPPED | OUTPATIENT
Start: 2023-11-27

## 2023-11-29 DIAGNOSIS — K64.9 HEMORRHOIDS, UNSPECIFIED HEMORRHOID TYPE: ICD-10-CM

## 2023-11-30 RX ORDER — HYDROCORTISONE 25 MG/G
CREAM TOPICAL 2 TIMES DAILY
Qty: 28 G | Refills: 0 | Status: SHIPPED | OUTPATIENT
Start: 2023-11-30

## 2023-12-12 DIAGNOSIS — F41.9 ANXIETY: ICD-10-CM

## 2023-12-12 DIAGNOSIS — I10 ESSENTIAL HYPERTENSION: ICD-10-CM

## 2023-12-12 DIAGNOSIS — E78.00 HIGH CHOLESTEROL: ICD-10-CM

## 2023-12-12 RX ORDER — CITALOPRAM HYDROBROMIDE 10 MG/1
10 TABLET ORAL DAILY
Qty: 90 TABLET | Refills: 1 | Status: SHIPPED | OUTPATIENT
Start: 2023-12-12

## 2023-12-12 RX ORDER — ATORVASTATIN CALCIUM 80 MG/1
80 TABLET, FILM COATED ORAL DAILY
Qty: 90 TABLET | Refills: 1 | Status: SHIPPED | OUTPATIENT
Start: 2023-12-12

## 2023-12-12 RX ORDER — HYDROCHLOROTHIAZIDE 25 MG/1
25 TABLET ORAL DAILY
Qty: 90 TABLET | Refills: 1 | Status: SHIPPED | OUTPATIENT
Start: 2023-12-12

## 2023-12-19 ENCOUNTER — OFFICE VISIT (OUTPATIENT)
Dept: FAMILY MEDICINE CLINIC | Facility: CLINIC | Age: 68
End: 2023-12-19
Payer: COMMERCIAL

## 2023-12-19 VITALS
WEIGHT: 204.6 LBS | SYSTOLIC BLOOD PRESSURE: 130 MMHG | OXYGEN SATURATION: 99 % | DIASTOLIC BLOOD PRESSURE: 84 MMHG | HEIGHT: 67 IN | HEART RATE: 94 BPM | BODY MASS INDEX: 32.11 KG/M2

## 2023-12-19 DIAGNOSIS — K62.5 RECTAL BLEEDING: ICD-10-CM

## 2023-12-19 DIAGNOSIS — K64.1 GRADE II HEMORRHOIDS: ICD-10-CM

## 2023-12-19 DIAGNOSIS — E11.00 TYPE 2 DIABETES MELLITUS WITH HYPEROSMOLARITY WITHOUT COMA, WITHOUT LONG-TERM CURRENT USE OF INSULIN (HCC): Primary | ICD-10-CM

## 2023-12-19 LAB — SL AMB POCT HEMOGLOBIN AIC: 7.7 (ref ?–6.5)

## 2023-12-19 PROCEDURE — 83036 HEMOGLOBIN GLYCOSYLATED A1C: CPT | Performed by: NURSE PRACTITIONER

## 2023-12-19 PROCEDURE — 99213 OFFICE O/P EST LOW 20 MIN: CPT | Performed by: NURSE PRACTITIONER

## 2023-12-19 RX ORDER — HYDROCORTISONE ACETATE 25 MG/1
25 SUPPOSITORY RECTAL 2 TIMES DAILY PRN
Qty: 12 SUPPOSITORY | Refills: 1 | Status: SHIPPED | OUTPATIENT
Start: 2023-12-19

## 2023-12-19 NOTE — ASSESSMENT & PLAN NOTE
Lab Results   Component Value Date    HGBA1C 7.7 (A) 12/19/2023     Improved HA1C from before.  Continue with current regimen.

## 2023-12-19 NOTE — PROGRESS NOTES
Name: Dawson Jane      : 1955      MRN: [id number]  Encounter Provider: ISIDRO Hendricks  Encounter Date: 2023   Encounter department: [facility]    Assessment & Plan     1. Type 2 diabetes mellitus with hyperosmolarity without coma, without long-term current use of insulin (HCC)  Assessment & Plan:    Lab Results   Component Value Date    HGBA1C 7.7 (A) 2023     Improved HA1C from before.  Continue with current regimen.      Orders:  -     POCT hemoglobin A1c    2. Grade II hemorrhoids  -     Ambulatory Referral to Colorectal Surgery; Future  -     hydrocortisone (ANUSOL-HC) 25 mg suppository; Insert 1 suppository (25 mg total) into the rectum 2 (two) times a day as needed for hemorrhoids    3. Rectal bleeding  -     Ambulatory Referral to Colorectal Surgery; Future  -     hydrocortisone (ANUSOL-HC) 25 mg suppository; Insert 1 suppository (25 mg total) into the rectum 2 (two) times a day as needed for hemorrhoids           Subjective      Here today with complaint of rectal bleeding with bowel movements.  He has a hx of hemorrhoids - has been using external hemorrhoid cream with minimal effects.  States he does not have blood with every BM but it has been happening more than normal with noted discomfort.  He has felt discomfort in his rectal area.        Review of Systems   Gastrointestinal:  Positive for anal bleeding, blood in stool and rectal pain.   All other systems reviewed and are negative.      Current Outpatient Medications on File Prior to Visit   Medication Sig   • aspirin (ECOTRIN LOW STRENGTH) 81 mg EC tablet Take 81 mg by mouth daily Pt stopped on May 2 for surgery on    • atorvastatin (LIPITOR) 80 mg tablet Take 1 tablet (80 mg total) by mouth daily   • carvedilol (COREG) 25 mg tablet Take 1 tablet (25 mg total) by mouth 2 (two) times a day with meals   • citalopram (CeleXA) 10 mg tablet Take 1 tablet (10 mg total) by mouth daily   • Coenzyme  "Q10 (CoQ-10) 100 MG CAPS Take 100 mg by mouth daily    • esomeprazole (NexIUM) 5 MG packet Take 5 mg by mouth daily   • Farxiga 10 MG tablet Take 1 tablet (10 mg total) by mouth daily   • hydrochlorothiazide (HYDRODIURIL) 25 mg tablet Take 1 tablet (25 mg total) by mouth daily   • hydrocortisone (ANUSOL-HC) 2.5 % rectal cream Apply topically 2 (two) times a day   • Multiple Vitamins-Minerals (PreserVision AREDS 2) CAPS Take 1 capsule by mouth daily    • Ozempic, 1 MG/DOSE, 4 MG/3ML injection pen Inject 0.75 mL (1 mg total) under the skin every 7 days   • polyethylene glycol (GOLYTELY) 4000 mL solution Take 4,000 mL by mouth once for 1 dose Take as directed by office   • ramipril (ALTACE) 10 MG capsule Take 1 capsule (10 mg total) by mouth daily       Objective     /84 (BP Location: Left arm, Patient Position: Sitting, Cuff Size: Standard)   Pulse 94   Ht 5' 7\" (1.702 m)   Wt 92.8 kg (204 lb 9.6 oz)   SpO2 99%   BMI 32.04 kg/m²     Physical Exam  Genitourinary:     Rectum: External hemorrhoid and internal hemorrhoid present. No mass. Normal anal tone.       Neurological:      Mental Status: He is alert and oriented to person, place, and time.       ISIDRO Hendricks    "

## 2023-12-19 NOTE — ASSESSMENT & PLAN NOTE
External hemorrhoids noted x 3.  Possible palpable internal hemorrhoids also.  External hemorrhoids did not look red and swollen.  Will refer to colon/rectal for further evaluation.  Anusol suppository prescribed to try as he has only been using the external cream.

## 2023-12-20 ENCOUNTER — TELEPHONE (OUTPATIENT)
Age: 68
End: 2023-12-20

## 2023-12-20 NOTE — TELEPHONE ENCOUNTER
Patients GI provider:  Dr. GREEN    Number to return call: ( [phone]     Reason for call: Pt calling has a procedure on  2/27 w DR GREEN for colonoscopy he recently has had some rectal bleeding due to Hemorrhoids he would like to know if they can be removed at this procedure and or if he can have this done with CRS to have Hemorrhoids removed      Scheduled procedure/appointment date if applicable: Apt/procedure   2/27

## 2023-12-24 DIAGNOSIS — E11.00 TYPE 2 DIABETES MELLITUS WITH HYPEROSMOLARITY WITHOUT COMA, WITHOUT LONG-TERM CURRENT USE OF INSULIN (HCC): ICD-10-CM

## 2023-12-24 RX ORDER — SEMAGLUTIDE 1.34 MG/ML
1 INJECTION, SOLUTION SUBCUTANEOUS
Qty: 12 ML | Refills: 0 | Status: SHIPPED | OUTPATIENT
Start: 2023-12-24

## 2024-01-21 DIAGNOSIS — E11.00 TYPE 2 DIABETES MELLITUS WITH HYPEROSMOLARITY WITHOUT COMA, WITHOUT LONG-TERM CURRENT USE OF INSULIN (HCC): ICD-10-CM

## 2024-01-22 RX ORDER — SEMAGLUTIDE 1.34 MG/ML
1 INJECTION, SOLUTION SUBCUTANEOUS
Qty: 12 ML | Refills: 0 | Status: SHIPPED | OUTPATIENT
Start: 2024-01-22

## 2024-01-23 DIAGNOSIS — E11.9 TYPE 2 DIABETES MELLITUS WITHOUT COMPLICATION, WITHOUT LONG-TERM CURRENT USE OF INSULIN (HCC): ICD-10-CM

## 2024-01-23 DIAGNOSIS — I10 ESSENTIAL HYPERTENSION: ICD-10-CM

## 2024-01-23 RX ORDER — RAMIPRIL 10 MG/1
10 CAPSULE ORAL DAILY
Qty: 90 CAPSULE | Refills: 1 | Status: SHIPPED | OUTPATIENT
Start: 2024-01-23

## 2024-01-23 RX ORDER — CARVEDILOL 25 MG/1
25 TABLET ORAL 2 TIMES DAILY WITH MEALS
Qty: 180 TABLET | Refills: 1 | Status: SHIPPED | OUTPATIENT
Start: 2024-01-23

## 2024-01-23 RX ORDER — DAPAGLIFLOZIN 10 MG/1
10 TABLET, FILM COATED ORAL DAILY
Qty: 90 TABLET | Refills: 1 | Status: SHIPPED | OUTPATIENT
Start: 2024-01-23

## 2024-01-30 ENCOUNTER — APPOINTMENT (OUTPATIENT)
Dept: LAB | Facility: HOSPITAL | Age: 69
End: 2024-01-30
Payer: COMMERCIAL

## 2024-01-30 DIAGNOSIS — E11.51 TYPE 2 DIABETES MELLITUS WITH DIABETIC PERIPHERAL ANGIOPATHY WITHOUT GANGRENE, WITHOUT LONG-TERM CURRENT USE OF INSULIN (HCC): ICD-10-CM

## 2024-01-30 LAB
ALBUMIN SERPL BCP-MCNC: 4.6 G/DL (ref 3.5–5)
ALP SERPL-CCNC: 98 U/L (ref 34–104)
ALT SERPL W P-5'-P-CCNC: 25 U/L (ref 7–52)
ANION GAP SERPL CALCULATED.3IONS-SCNC: 6 MMOL/L
AST SERPL W P-5'-P-CCNC: 14 U/L (ref 13–39)
BILIRUB SERPL-MCNC: 0.4 MG/DL (ref 0.2–1)
BUN SERPL-MCNC: 15 MG/DL (ref 5–25)
CALCIUM SERPL-MCNC: 9.6 MG/DL (ref 8.4–10.2)
CHLORIDE SERPL-SCNC: 105 MMOL/L (ref 96–108)
CO2 SERPL-SCNC: 30 MMOL/L (ref 21–32)
CREAT SERPL-MCNC: 0.85 MG/DL (ref 0.6–1.3)
GFR SERPL CREATININE-BSD FRML MDRD: 90 ML/MIN/1.73SQ M
GLUCOSE P FAST SERPL-MCNC: 147 MG/DL (ref 65–99)
POTASSIUM SERPL-SCNC: 3.6 MMOL/L (ref 3.5–5.3)
PROT SERPL-MCNC: 7.7 G/DL (ref 6.4–8.4)
SODIUM SERPL-SCNC: 141 MMOL/L (ref 135–147)

## 2024-01-30 PROCEDURE — 80053 COMPREHEN METABOLIC PANEL: CPT

## 2024-01-30 PROCEDURE — 36415 COLL VENOUS BLD VENIPUNCTURE: CPT

## 2024-02-20 DIAGNOSIS — E11.00 TYPE 2 DIABETES MELLITUS WITH HYPEROSMOLARITY WITHOUT COMA, WITHOUT LONG-TERM CURRENT USE OF INSULIN (HCC): ICD-10-CM

## 2024-02-21 RX ORDER — SEMAGLUTIDE 1.34 MG/ML
1 INJECTION, SOLUTION SUBCUTANEOUS
Qty: 12 ML | Refills: 0 | Status: SHIPPED | OUTPATIENT
Start: 2024-02-21

## 2024-02-22 ENCOUNTER — OFFICE VISIT (OUTPATIENT)
Dept: FAMILY MEDICINE CLINIC | Facility: CLINIC | Age: 69
End: 2024-02-22
Payer: COMMERCIAL

## 2024-02-22 VITALS
BODY MASS INDEX: 32.3 KG/M2 | OXYGEN SATURATION: 96 % | HEIGHT: 67 IN | DIASTOLIC BLOOD PRESSURE: 72 MMHG | HEART RATE: 99 BPM | TEMPERATURE: 98.7 F | WEIGHT: 205.8 LBS | SYSTOLIC BLOOD PRESSURE: 138 MMHG

## 2024-02-22 DIAGNOSIS — R07.0 THROAT PAIN: Primary | ICD-10-CM

## 2024-02-22 DIAGNOSIS — R09.81 NASAL CONGESTION: ICD-10-CM

## 2024-02-22 PROBLEM — M75.82 TENDINITIS OF LEFT ROTATOR CUFF: Status: RESOLVED | Noted: 2021-02-17 | Resolved: 2024-02-22

## 2024-02-22 PROBLEM — G89.29 CHRONIC LEFT SHOULDER PAIN: Status: RESOLVED | Noted: 2021-02-17 | Resolved: 2024-02-22

## 2024-02-22 PROBLEM — M25.512 CHRONIC LEFT SHOULDER PAIN: Status: RESOLVED | Noted: 2021-02-17 | Resolved: 2024-02-22

## 2024-02-22 LAB
SARS-COV-2 AG UPPER RESP QL IA: NEGATIVE
VALID CONTROL: ABNORMAL

## 2024-02-22 PROCEDURE — 99213 OFFICE O/P EST LOW 20 MIN: CPT | Performed by: NURSE PRACTITIONER

## 2024-02-22 PROCEDURE — 87811 SARS-COV-2 COVID19 W/OPTIC: CPT | Performed by: NURSE PRACTITIONER

## 2024-02-22 RX ORDER — AZITHROMYCIN 250 MG/1
TABLET, FILM COATED ORAL
Qty: 6 TABLET | Refills: 0 | Status: SHIPPED | OUTPATIENT
Start: 2024-02-22 | End: 2024-02-27 | Stop reason: HOSPADM

## 2024-02-22 NOTE — PROGRESS NOTES
Name: Dawson Jane      : 1955      MRN: [id number]  Encounter Provider: ISIDRO Hendricks  Encounter Date: 2024   Encounter department: [facility]    Assessment & Plan     1. Throat pain  -     azithromycin (ZITHROMAX) 250 mg tablet; Take 2 tablets today then 1 tablet daily x 4 days  -     POCT Rapid Covid Ag    2. Nasal congestion  -     azithromycin (ZITHROMAX) 250 mg tablet; Take 2 tablets today then 1 tablet daily x 4 days  -     POCT Rapid Covid Ag    COVID negative - sx ongoing for over 5 days.  He is for a colonoscopy next week.  Will treat today with oral abx therapy.         Subjective      Here today with complaint of throat pain, sinus congestion for approximately 5 days.  Notes of also sneezing.  He began with a fever yesterday.  He is afebrile in the office today.      Review of Systems   Constitutional:  Positive for fever.   HENT:  Positive for congestion and sore throat.    Respiratory:  Positive for cough.    All other systems reviewed and are negative.    Current Outpatient Medications on File Prior to Visit   Medication Sig   • aspirin (ECOTRIN LOW STRENGTH) 81 mg EC tablet Take 81 mg by mouth every morning   • carvedilol (COREG) 25 mg tablet Take 1 tablet (25 mg total) by mouth 2 (two) times a day with meals   • Coenzyme Q10 (CoQ-10) 100 MG CAPS Take 100 mg by mouth daily    • esomeprazole (NexIUM) 5 MG packet Take 5 mg by mouth daily   • hydrochlorothiazide (HYDRODIURIL) 25 mg tablet Take 1 tablet (25 mg total) by mouth daily   • hydrocortisone (ANUSOL-HC) 2.5 % rectal cream Apply topically 2 (two) times a day (Patient taking differently: Apply 1 Application topically if needed for hemorrhoids)   • hydrocortisone (ANUSOL-HC) 25 mg suppository Insert 1 suppository (25 mg total) into the rectum 2 (two) times a day as needed for hemorrhoids   • Multiple Vitamins-Minerals (PreserVision AREDS 2) CAPS Take 1 capsule by mouth daily    • Ozempic, 1 MG/DOSE,  "4 MG/3ML injection pen Inject 0.75 mL (1 mg total) under the skin every 7 days   • ramipril (ALTACE) 10 MG capsule Take 1 capsule (10 mg total) by mouth daily (Patient taking differently: Take 10 mg by mouth every morning)   • atorvastatin (LIPITOR) 80 mg tablet Take 1 tablet (80 mg total) by mouth daily (Patient not taking: Reported on 2/22/2024)   • citalopram (CeleXA) 10 mg tablet Take 1 tablet (10 mg total) by mouth daily (Patient taking differently: Take 10 mg by mouth every morning)   • Farxiga 10 MG tablet Take 1 tablet (10 mg total) by mouth daily (Patient not taking: Reported on 2/22/2024)   • polyethylene glycol (GOLYTELY) 4000 mL solution Take 4,000 mL by mouth once for 1 dose Take as directed by office       Objective     /72 (BP Location: Right arm, Patient Position: Sitting)   Pulse 99   Temp 98.7 °F (37.1 °C) (Oral)   Ht 5' 7\" (1.702 m)   Wt 93.4 kg (205 lb 12.8 oz)   SpO2 96%   BMI 32.23 kg/m²     Physical Exam  HENT:      Nose: Congestion present.   Pulmonary:      Effort: Pulmonary effort is normal.      Breath sounds: Normal breath sounds.   Neurological:      Mental Status: He is alert and oriented to person, place, and time.   ISIDRO Hendricks    "

## 2024-02-27 ENCOUNTER — HOSPITAL ENCOUNTER (OUTPATIENT)
Dept: PERIOP | Facility: HOSPITAL | Age: 69
Setting detail: OUTPATIENT SURGERY
Discharge: HOME | End: 2024-02-27
Attending: STUDENT IN AN ORGANIZED HEALTH CARE EDUCATION/TRAINING PROGRAM
Payer: COMMERCIAL

## 2024-02-27 ENCOUNTER — ANESTHESIA EVENT (OUTPATIENT)
Dept: PERIOP | Facility: HOSPITAL | Age: 69
End: 2024-02-27

## 2024-02-27 ENCOUNTER — ANESTHESIA (OUTPATIENT)
Dept: PERIOP | Facility: HOSPITAL | Age: 69
End: 2024-02-27

## 2024-02-27 VITALS
WEIGHT: 205 LBS | DIASTOLIC BLOOD PRESSURE: 71 MMHG | BODY MASS INDEX: 32.18 KG/M2 | TEMPERATURE: 96.4 F | HEART RATE: 75 BPM | RESPIRATION RATE: 18 BRPM | OXYGEN SATURATION: 94 % | SYSTOLIC BLOOD PRESSURE: 140 MMHG | HEIGHT: 67 IN

## 2024-02-27 DIAGNOSIS — Z86.010 HISTORY OF COLON POLYPS: ICD-10-CM

## 2024-02-27 LAB
GLUCOSE SERPL-MCNC: 144 MG/DL (ref 65–140)
GLUCOSE SERPL-MCNC: 157 MG/DL (ref 65–140)

## 2024-02-27 PROCEDURE — 88305 TISSUE EXAM BY PATHOLOGIST: CPT | Performed by: PATHOLOGY

## 2024-02-27 PROCEDURE — 82948 REAGENT STRIP/BLOOD GLUCOSE: CPT

## 2024-02-27 RX ORDER — PHENYLEPHRINE HCL IN 0.9% NACL 1 MG/10 ML
SYRINGE (ML) INTRAVENOUS AS NEEDED
Status: DISCONTINUED | OUTPATIENT
Start: 2024-02-27 | End: 2024-02-27

## 2024-02-27 RX ORDER — SODIUM CHLORIDE, SODIUM LACTATE, POTASSIUM CHLORIDE, CALCIUM CHLORIDE 600; 310; 30; 20 MG/100ML; MG/100ML; MG/100ML; MG/100ML
INJECTION, SOLUTION INTRAVENOUS CONTINUOUS PRN
Status: DISCONTINUED | OUTPATIENT
Start: 2024-02-27 | End: 2024-02-27

## 2024-02-27 RX ORDER — LIDOCAINE HYDROCHLORIDE 10 MG/ML
INJECTION, SOLUTION EPIDURAL; INFILTRATION; INTRACAUDAL; PERINEURAL AS NEEDED
Status: DISCONTINUED | OUTPATIENT
Start: 2024-02-27 | End: 2024-02-27

## 2024-02-27 RX ORDER — PROPOFOL 10 MG/ML
INJECTION, EMULSION INTRAVENOUS AS NEEDED
Status: DISCONTINUED | OUTPATIENT
Start: 2024-02-27 | End: 2024-02-27

## 2024-02-27 RX ORDER — SODIUM CHLORIDE, SODIUM LACTATE, POTASSIUM CHLORIDE, CALCIUM CHLORIDE 600; 310; 30; 20 MG/100ML; MG/100ML; MG/100ML; MG/100ML
125 INJECTION, SOLUTION INTRAVENOUS CONTINUOUS
Status: DISCONTINUED | OUTPATIENT
Start: 2024-02-27 | End: 2024-03-02 | Stop reason: HOSPADM

## 2024-02-27 RX ADMIN — PROPOFOL 50 MG: 10 INJECTION, EMULSION INTRAVENOUS at 07:30

## 2024-02-27 RX ADMIN — SODIUM CHLORIDE, SODIUM LACTATE, POTASSIUM CHLORIDE, AND CALCIUM CHLORIDE: .6; .31; .03; .02 INJECTION, SOLUTION INTRAVENOUS at 07:26

## 2024-02-27 RX ADMIN — Medication 100 MCG: at 08:00

## 2024-02-27 RX ADMIN — LIDOCAINE HYDROCHLORIDE 50 MG: 10 INJECTION, SOLUTION EPIDURAL; INFILTRATION; INTRACAUDAL; PERINEURAL at 07:29

## 2024-02-27 RX ADMIN — PROPOFOL 100 MG: 10 INJECTION, EMULSION INTRAVENOUS at 07:29

## 2024-02-27 RX ADMIN — SODIUM CHLORIDE, SODIUM LACTATE, POTASSIUM CHLORIDE, AND CALCIUM CHLORIDE 125 ML/HR: .6; .31; .03; .02 INJECTION, SOLUTION INTRAVENOUS at 07:05

## 2024-02-27 RX ADMIN — PROPOFOL 50 MG: 10 INJECTION, EMULSION INTRAVENOUS at 07:32

## 2024-02-27 RX ADMIN — PROPOFOL 120 MCG/KG/MIN: 10 INJECTION, EMULSION INTRAVENOUS at 07:34

## 2024-02-27 NOTE — ANESTHESIA POSTPROCEDURE EVALUATION
Post-Op Assessment Note    CV Status:  Stable  Pain Score: 0    Pain management: satisfactory to patient       Mental Status:  Alert and awake   Hydration Status:  Euvolemic   PONV Controlled:  Controlled   Airway Patency:  Patent     Post Op Vitals Reviewed: Yes    No anethesia notable event occurred.    Staff: CRNA               BP   105/69   Temp      Pulse  75   Resp   17   SpO2   100

## 2024-02-27 NOTE — H&P
[facility]  History & Physical     PATIENT INFO     Name: Dawson Jane  YOB: 1955   Age: 68 y.o.   Sex: male   MRN: [id number]     HISTORY OF PRESENT ILLNESS     Dawson Jane is a 68 y.o. year old male who presents for colonoscopy for history of piecemeal polypectomy and multiple colon polyps.  Last colonoscopy in 8/2023 with multiple polyps removed.  Not on antithrombotics or anticoagulants.     REVIEW OF SYSTEMS     Per the HPI, and otherwise unremarkable.    Historical Information   Past Medical History:   Diagnosis Date    Allergic Seasonal    Anesthesia     Pt reports that he became agitated after IV sedation for a colonoscopy    Coronary artery disease 08/2014    COVID-19 01/2021    Pt reports fever, fatigue, nausea and weakness. Pt denies breathing issues. Symptoms lasted 2 weeks.    Diabetes mellitus (HCC)     GERD (gastroesophageal reflux disease)     Hard to intubate     Pt received a letter stating that it was hard to visualize vocal chords    Heart attack (HCC)     High cholesterol     Hypertension     Myocardial infarction (HCC) 08/2014    Stent    Wears glasses      Past Surgical History:   Procedure Laterality Date    CARDIAC CATHETERIZATION  08/19/2014    Pt reports having one stent placed.    CHOLECYSTECTOMY      COLONOSCOPY      CO SURGICAL ARTHROSCOPY SHOULDER XTNSV DBRDMT 3+ Left 5/6/2021    Procedure: ARTHROSCOPY SHOULDER , debridement; open rotator cuff repair;  Surgeon: Wayne Peace;  Location:  MAIN OR;  Service: Orthopedics    VASECTOMY      VASECTOMY REVERSAL       Social History   Social History     Substance and Sexual Activity   Alcohol Use Yes    Alcohol/week: 5.0 standard drinks of alcohol    Types: 5 Cans of beer per week     Social History     Substance and Sexual Activity   Drug Use Never     Social History     Tobacco Use   Smoking Status Former    Current packs/day: 0.00    Types: Cigarettes    Quit date: 2/17/1985    Years  "since quittin.0   Smokeless Tobacco Never     Family History   Problem Relation Age of Onset    Dementia Mother     No Known Problems Father         MEDICATIONS & ALLERGIES     Current Outpatient Medications   Medication Instructions    aspirin (ECOTRIN LOW STRENGTH) 81 mg, Oral, Every morning    atorvastatin (LIPITOR) 80 mg, Oral, Daily    carvedilol (COREG) 25 mg, Oral, 2 times daily with meals    citalopram (CELEXA) 10 mg, Oral, Daily    CoQ-10 100 mg, Oral, Daily    Farxiga 10 mg, Oral, Daily    hydroCHLOROthiazide 25 mg, Oral, Daily    hydrocortisone (ANUSOL-HC) 2.5 % rectal cream Topical, 2 times daily    hydrocortisone (ANUSOL-HC) 25 mg, Rectal, 2 times daily PRN    Multiple Vitamins-Minerals (PreserVision AREDS 2) CAPS 1 capsule, Oral, Daily    NexIUM 5 mg, Oral, Daily    Ozempic (1 MG/DOSE) 1 mg, Subcutaneous, Every 7 days    polyethylene glycol (GOLYTELY) 4000 mL solution 4,000 mL, Oral, Once, Take as directed by office    ramipril (ALTACE) 10 mg, Oral, Daily     Allergies   Allergen Reactions    Food Allergy [Soybean Oil - Food Allergy] Anaphylaxis     Organic soy milk. Pt reports having allergy shots at that time.        PHYSICAL EXAM      Objective   Blood pressure 166/88, pulse 83, temperature (!) 97 °F (36.1 °C), temperature source Tympanic, resp. rate 18, height 5' 7\" (1.702 m), weight 93 kg (205 lb), SpO2 94%. Body mass index is 32.11 kg/m².    General Appearance:   Alert, cooperative, no distress   Lungs:   Equal chest rise, respirations unlabored    Heart:   Regular rate and rhythm   Abdomen:   Soft, non-tender, non-distended; normal bowel sounds; no masses, no organomegaly    Extremities:   No edema       ASSESSMENT & PLAN     This is a 68 y.o. year old male here for colonoscopy, and he is stable and optimized for his procedure.      Josef Pradhan D.O.  [facility]  Division of Gastroenterology & Hepatology  Available on [email or web address]  [email or web address]    ** Please Note: " This note is constructed using a voice recognition dictation system. **

## 2024-02-27 NOTE — ANESTHESIA PREPROCEDURE EVALUATION
Procedure:  COLONOSCOPY    Relevant Problems   ANESTHESIA (within normal limits)      CARDIO   (+) Coronary artery disease   (+) High cholesterol   (+) Hypertension   (+) White coat syndrome with diagnosis of hypertension      GI/HEPATIC   (+) Gastroesophageal reflux disease without esophagitis      NEURO/PSYCH   (+) Anxiety        Physical Exam    Airway    Mallampati score: II  TM Distance: >3 FB  Neck ROM: full     Dental       Cardiovascular  Rate: normal    Pulmonary  Pulmonary exam normal     Other Findings        Anesthesia Plan  ASA Score- 3     Anesthesia Type- IV sedation with anesthesia with ASA Monitors.         Additional Monitors:     Airway Plan:     Comment: Per patient, appropriately NPO, denies active CP/SOB/wheezing/symptoms related to heartburn/nausea/vomiting  .       Plan Factors-Exercise tolerance (METS): >4 METS.    Chart reviewed.    Patient summary reviewed.    Patient is not a current smoker.              Induction- intravenous.    Postoperative Plan-     Informed Consent- Anesthetic plan and risks discussed with patient.  I personally reviewed this patient with the CRNA. Discussed and agreed on the Anesthesia Plan with the CRNA..

## 2024-02-28 PROCEDURE — 88305 TISSUE EXAM BY PATHOLOGIST: CPT | Performed by: PATHOLOGY

## 2024-03-13 DIAGNOSIS — K62.5 RECTAL BLEEDING: ICD-10-CM

## 2024-03-13 DIAGNOSIS — K64.1 GRADE II HEMORRHOIDS: ICD-10-CM

## 2024-03-14 RX ORDER — HYDROCORTISONE ACETATE 25 MG/1
25 SUPPOSITORY RECTAL 2 TIMES DAILY PRN
Qty: 12 SUPPOSITORY | Refills: 1 | Status: SHIPPED | OUTPATIENT
Start: 2024-03-14

## 2024-03-15 DIAGNOSIS — E11.00 TYPE 2 DIABETES MELLITUS WITH HYPEROSMOLARITY WITHOUT COMA, WITHOUT LONG-TERM CURRENT USE OF INSULIN (HCC): ICD-10-CM

## 2024-03-15 DIAGNOSIS — I10 ESSENTIAL HYPERTENSION: ICD-10-CM

## 2024-03-15 DIAGNOSIS — F41.9 ANXIETY: ICD-10-CM

## 2024-03-15 DIAGNOSIS — E78.00 HIGH CHOLESTEROL: ICD-10-CM

## 2024-03-16 RX ORDER — SEMAGLUTIDE 1.34 MG/ML
1 INJECTION, SOLUTION SUBCUTANEOUS
Qty: 12 ML | Refills: 0 | Status: SHIPPED | OUTPATIENT
Start: 2024-03-16

## 2024-03-16 RX ORDER — CITALOPRAM HYDROBROMIDE 10 MG/1
10 TABLET ORAL DAILY
Qty: 90 TABLET | Refills: 0 | Status: SHIPPED | OUTPATIENT
Start: 2024-03-16

## 2024-03-16 RX ORDER — HYDROCHLOROTHIAZIDE 25 MG/1
25 TABLET ORAL DAILY
Qty: 90 TABLET | Refills: 0 | Status: SHIPPED | OUTPATIENT
Start: 2024-03-16

## 2024-03-16 RX ORDER — ATORVASTATIN CALCIUM 80 MG/1
80 TABLET, FILM COATED ORAL DAILY
Qty: 90 TABLET | Refills: 0 | Status: SHIPPED | OUTPATIENT
Start: 2024-03-16

## 2024-03-18 ENCOUNTER — TELEPHONE (OUTPATIENT)
Age: 69
End: 2024-03-18

## 2024-03-18 NOTE — TELEPHONE ENCOUNTER
PA for Ozempic, 1 MG/DOSE, 4 MG/3ML     Submitted via    []CMM-KEY   [x][employer]-Case ID # [id number]   []Faxed to plan   []Other website   []Phone call Case ID #     Office notes sent, clinical questions answered. Awaiting determination    Turnaround time for your insurance to make a decision on your Prior Authorization can take 7-21 business days.

## 2024-03-19 NOTE — TELEPHONE ENCOUNTER
PA for Ozempic, 1 MG/DOSE, 4 MG/3ML  not required     Reason- (screenshot if applicable)              Message sent to provider pool No

## 2024-03-27 ENCOUNTER — TELEPHONE (OUTPATIENT)
Age: 69
End: 2024-03-27

## 2024-03-27 ENCOUNTER — OFFICE VISIT (OUTPATIENT)
Dept: FAMILY MEDICINE CLINIC | Facility: CLINIC | Age: 69
End: 2024-03-27
Payer: COMMERCIAL

## 2024-03-27 VITALS
DIASTOLIC BLOOD PRESSURE: 72 MMHG | HEIGHT: 67 IN | SYSTOLIC BLOOD PRESSURE: 140 MMHG | HEART RATE: 97 BPM | BODY MASS INDEX: 31.92 KG/M2 | WEIGHT: 203.4 LBS | OXYGEN SATURATION: 98 %

## 2024-03-27 DIAGNOSIS — E11.00 TYPE 2 DIABETES MELLITUS WITH HYPEROSMOLARITY WITHOUT COMA, WITHOUT LONG-TERM CURRENT USE OF INSULIN (HCC): ICD-10-CM

## 2024-03-27 DIAGNOSIS — E78.00 HIGH CHOLESTEROL: ICD-10-CM

## 2024-03-27 DIAGNOSIS — R05.1 ACUTE COUGH: ICD-10-CM

## 2024-03-27 DIAGNOSIS — I10 PRIMARY HYPERTENSION: ICD-10-CM

## 2024-03-27 DIAGNOSIS — F41.9 ANXIETY: ICD-10-CM

## 2024-03-27 DIAGNOSIS — E11.9 TYPE 2 DIABETES MELLITUS WITHOUT COMPLICATION, WITHOUT LONG-TERM CURRENT USE OF INSULIN (HCC): ICD-10-CM

## 2024-03-27 DIAGNOSIS — K21.9 GASTROESOPHAGEAL REFLUX DISEASE WITHOUT ESOPHAGITIS: ICD-10-CM

## 2024-03-27 DIAGNOSIS — R09.81 SINUS CONGESTION: Primary | ICD-10-CM

## 2024-03-27 DIAGNOSIS — L02.519 ABSCESS OF INDEX FINGER: ICD-10-CM

## 2024-03-27 DIAGNOSIS — I10 WHITE COAT SYNDROME WITH DIAGNOSIS OF HYPERTENSION: ICD-10-CM

## 2024-03-27 LAB
CREAT UR-MCNC: 49.3 MG/DL
MICROALBUMIN UR-MCNC: 28.2 MG/L
MICROALBUMIN/CREAT 24H UR: 57 MG/G CREATININE (ref 0–30)
SL AMB POCT HEMOGLOBIN AIC: 8.1 (ref ?–6.5)

## 2024-03-27 PROCEDURE — 99214 OFFICE O/P EST MOD 30 MIN: CPT | Performed by: NURSE PRACTITIONER

## 2024-03-27 PROCEDURE — 87205 SMEAR GRAM STAIN: CPT | Performed by: NURSE PRACTITIONER

## 2024-03-27 PROCEDURE — 82043 UR ALBUMIN QUANTITATIVE: CPT | Performed by: NURSE PRACTITIONER

## 2024-03-27 PROCEDURE — 87186 SC STD MICRODIL/AGAR DIL: CPT | Performed by: NURSE PRACTITIONER

## 2024-03-27 PROCEDURE — 87070 CULTURE OTHR SPECIMN AEROBIC: CPT | Performed by: NURSE PRACTITIONER

## 2024-03-27 PROCEDURE — 82570 ASSAY OF URINE CREATININE: CPT | Performed by: NURSE PRACTITIONER

## 2024-03-27 PROCEDURE — 83036 HEMOGLOBIN GLYCOSYLATED A1C: CPT | Performed by: NURSE PRACTITIONER

## 2024-03-27 PROCEDURE — 87147 CULTURE TYPE IMMUNOLOGIC: CPT | Performed by: NURSE PRACTITIONER

## 2024-03-27 RX ORDER — AMOXICILLIN 500 MG/1
500 CAPSULE ORAL EVERY 12 HOURS SCHEDULED
Qty: 14 CAPSULE | Refills: 0 | Status: SHIPPED | OUTPATIENT
Start: 2024-03-27 | End: 2024-03-30

## 2024-03-27 RX ORDER — RAMIPRIL 10 MG/1
10 CAPSULE ORAL DAILY
COMMUNITY

## 2024-03-27 RX ORDER — DAPAGLIFLOZIN 10 MG/1
10 TABLET, FILM COATED ORAL DAILY
COMMUNITY

## 2024-03-27 NOTE — PROGRESS NOTES
Name: Dawson Jane      : 1955      MRN: [id number]  Encounter Provider: ISIDRO Hendricks  Encounter Date: 3/27/2024   Encounter department: [facility] PRIMARY [facility]ARE    Assessment & Plan     1. Sinus congestion  -     amoxicillin (AMOXIL) 500 mg capsule; Take 1 capsule (500 mg total) by mouth every 12 (twelve) hours for 7 days    2. Acute cough  -     amoxicillin (AMOXIL) 500 mg capsule; Take 1 capsule (500 mg total) by mouth every 12 (twelve) hours for 7 days    3. Type 2 diabetes mellitus with hyperosmolarity without coma, without long-term current use of insulin ([facility])  -     POCT hemoglobin A1c  -     Albumin / creatinine urine ratio; Future  -     Albumin / creatinine urine ratio    4. Abscess of index finger  -     amoxicillin (AMOXIL) 500 mg capsule; Take 1 capsule (500 mg total) by mouth every 12 (twelve) hours for 7 days  -     Wound culture and Gram stain; Future    5. Type 2 diabetes mellitus without complication, without long-term current use of insulin ([facility])       Will treat with oral abx at this time.  Attempted a wound culture of the abscess of his right first finger.  Was placed on oral abx for congestion etc so will wait for results for that.   Topical bactroban ointment prescribed to put on abscess.        Subjective      Here today for onset of congestion, cough which started 6 days ago.  He was ill 4 weeks ago and then improved. Reports he visited his daughter and they had adenovirus.  He began on Friday with congestion and coughing.  Using nasal rinse which helps the cough.  He has not been feeling any improvement for the past 6 days.  Also with a hx of a wound on his right index finger -reports it has been there for a month and has not healed.  It does drain sometimes.      Cough  This is a recurrent problem. The current episode started 1 to 4 weeks ago. The problem has been waxing and waning. The problem occurs every few hours. The cough is Productive of purulent  sputum. Associated symptoms include ear congestion and nasal congestion. Pertinent negatives include no heartburn, hemoptysis, sweats or weight loss.   Sinus Problem  Associated symptoms include congestion and coughing.   Hand Pain       Review of Systems   Constitutional:  Negative for weight loss.   HENT:  Positive for congestion.    Respiratory:  Positive for cough. Negative for hemoptysis.    Gastrointestinal:  Negative for heartburn.   Skin:  Positive for wound.       Current Outpatient Medications on File Prior to Visit   Medication Sig    aspirin (ECOTRIN LOW STRENGTH) 81 mg EC tablet Take 81 mg by mouth every morning    atorvastatin (LIPITOR) 80 mg tablet Take 1 tablet (80 mg total) by mouth daily    carvedilol (COREG) 25 mg tablet Take 1 tablet (25 mg total) by mouth 2 (two) times a day with meals    citalopram (CeleXA) 10 mg tablet Take 1 tablet (10 mg total) by mouth daily    Coenzyme Q10 (CoQ-10) 100 MG CAPS Take 100 mg by mouth daily     esomeprazole (NexIUM) 5 MG packet Take 5 mg by mouth daily    hydroCHLOROthiazide 25 mg tablet Take 1 tablet (25 mg total) by mouth daily    hydrocortisone (ANUSOL-HC) 25 mg suppository Insert 1 suppository (25 mg total) into the rectum 2 (two) times a day as needed for hemorrhoids    Multiple Vitamins-Minerals (PreserVision AREDS 2) CAPS Take 1 capsule by mouth daily     Ozempic, 1 MG/DOSE, 4 MG/3ML injection pen Inject 0.75 mL (1 mg total) under the skin every 7 days    [DISCONTINUED] ramipril (ALTACE) 10 MG capsule Take 1 capsule (10 mg total) by mouth daily (Patient taking differently: Take 10 mg by mouth every morning)    Farxiga 10 MG tablet Take 1 tablet (10 mg total) by mouth daily (Patient not taking: Reported on 2/22/2024)    [DISCONTINUED] hydrocortisone (ANUSOL-HC) 2.5 % rectal cream Apply topically 2 (two) times a day (Patient taking differently: Apply 1 Application topically if needed for hemorrhoids)       Objective     /72 (BP Location: Left arm,  "Patient Position: Sitting, Cuff Size: Standard)   Pulse 97   Ht 5' 7\" (1.702 m)   Wt 92.3 kg (203 lb 6.4 oz)   SpO2 98%   BMI 31.86 kg/m²     Physical Exam  Pulmonary:      Effort: Pulmonary effort is normal.      Breath sounds: Normal breath sounds.   Skin:     Comments: Small wound - open area - less than 0.5 cm in diameter  noted on dorsal/outer lateral area of right index finger.     Neurological:      Mental Status: He is alert and oriented to person, place, and time.       ISIDRO Hendricks    "

## 2024-03-27 NOTE — TELEPHONE ENCOUNTER
Patient reports the only med he stopped taking and wanted off his med list was the hydrocortisone cream. He noticed his Farxiga and Ramipril were not on his active med list after his appointment today. He might have accidentally removed them via MyChart. Patient would like them added back to his active med list and a call back only if by any chance his PCP did not want him to take them.

## 2024-03-27 NOTE — ASSESSMENT & PLAN NOTE
Lab Results   Component Value Date    HGBA1C 8.1 (A) 03/27/2024   Increased from previous - hx of being sick intermittently over the past 3 months.  Also had to stop ozempic for a week due to having a colonoscopy.     Current Medication: Metformin, Ozempic     Checks Home Blood Sugar: Yes    Current HA1C: 8.1    Last HA1C: 7.7    Last GFR: utd    Urine Microalbumin:utd    Last Foot Exam: Due next visit    Neuropathy symptoms: none    Eye Exam: utd

## 2024-03-29 DIAGNOSIS — B95.8 STAPH SKIN INFECTION: Primary | ICD-10-CM

## 2024-03-29 DIAGNOSIS — L08.9 STAPH SKIN INFECTION: Primary | ICD-10-CM

## 2024-03-29 LAB
BACTERIA WND AEROBE CULT: ABNORMAL
GRAM STN SPEC: ABNORMAL
GRAM STN SPEC: ABNORMAL

## 2024-03-29 RX ORDER — SULFAMETHOXAZOLE AND TRIMETHOPRIM 800; 160 MG/1; MG/1
1 TABLET ORAL 2 TIMES DAILY
Qty: 14 TABLET | Refills: 0 | Status: SHIPPED | OUTPATIENT
Start: 2024-03-29 | End: 2024-03-30

## 2024-03-30 ENCOUNTER — AMB VIDEO VISIT (OUTPATIENT)
Dept: OTHER | Facility: HOSPITAL | Age: 69
End: 2024-03-30
Payer: COMMERCIAL

## 2024-03-30 VITALS — HEART RATE: 92 BPM | TEMPERATURE: 99.8 F

## 2024-03-30 DIAGNOSIS — S61.209A OPEN WOUND OF FINGER, INITIAL ENCOUNTER: Primary | ICD-10-CM

## 2024-03-30 DIAGNOSIS — J40 BRONCHITIS: ICD-10-CM

## 2024-03-30 PROCEDURE — 99212 OFFICE O/P EST SF 10 MIN: CPT | Performed by: PHYSICIAN ASSISTANT

## 2024-03-30 RX ORDER — DOXYCYCLINE HYCLATE 100 MG
100 TABLET ORAL 2 TIMES DAILY
Qty: 14 TABLET | Refills: 0 | Status: SHIPPED | OUTPATIENT
Start: 2024-03-30 | End: 2024-04-06

## 2024-03-30 NOTE — CARE ANYWHERE EVISITS
Visit Summary for Dawson Eastmanr - Gender: Male - Date of Birth: [id number]  Date: [id number] - Duration: 12 minutes  Patient: Dawson Núñez Buck  Provider: Anish Ward PA-C    Patient Contact Information  Address  [address] [facility]; [place] [address]  [phone]    Visit Topics  Headache [Added By: Self - 2024-03-30]  have a bad chest cold also lesion on finger tested staphylococcus auras also pink eye. dr started amoxicillan then changed to Bactrim . since taking 2 doses of Bactrim I now have diarrhea and vomiting and nausea. pulse ox is 98. no fever. what else can   you prescribe because I  can't eat.with out vomiting and nausea.  [Added By: Self - 2024-03-30]  Cold [Added By: Self - 2024-03-30]    Triage Questions   What is your current physical address in the event of a medical emergency? Answer []  Are you allergic to any medications? Answer []  Are you now or could you be pregnant? Answer []  Do you have any immune system compromise or chronic lung   disease? Answer []  Do you have any vulnerable family members in the home (infant, pregnant, cancer, elderly)? Answer []     Conversation Transcripts  [0A][0A] [Notification] You are connected with Anish Ward PA-C, Urgent Care Specialist.[0A][Notification] Dawson Jane is located in [place].[0A][Notification] Dawson Jane has shared health history...[0A]    Diagnosis  Open wound of other finger without damage to nail  Acute bronchitis, unspecified    Procedures  Value: [id number] Code: CPT-4 UNLISTED E&M SERVICE    Medications Prescribed    No prescriptions ordered    Electronically signed by: Anish Ward PA-C(NPI [id number])

## 2024-03-30 NOTE — PROGRESS NOTES
Required Documentation:  Encounter provider Anish Ward PA-C    Provider located at [facility]  VIRTUAL CARE   [address] [id number]    Identify all parties in room with patient during virtual visit:  significant other-permission granted    The patient was identified by name and date of birth. Dawson Jane was informed that this is a telemedicine visit and that the visit is being conducted through the Care Anywhere Simple Admit platform. He agrees to proceed..  My office door was closed. No one else was in the room.  He acknowledged consent and understanding of privacy and security of the video platform. The patient has agreed to participate and understands they can discontinue the visit at any time.    Verification of patient location:    Patient is located at Home in the following state in which I hold an active license [place]    Patient is aware this is a billable service.     Reason for visit is No chief complaint on file.       Subjective  HPI   Pt had cough congestion cold began around 3/19/24, previously ill 4 weeks prior to that then had improved. Also being treated for right index finger would. He was given bactrim for the finger which cultures grew staph from, then switched from amoxicillin to bactrim which have caused diarrhea nausea and vomiting.     Past Medical History:   Diagnosis Date   • Allergic Seasonal   • Anesthesia     Pt reports that he became agitated after IV sedation for a colonoscopy   • Coronary artery disease 08/2014   • COVID-19 01/2021    Pt reports fever, fatigue, nausea and weakness. Pt denies breathing issues. Symptoms lasted 2 weeks.   • Diabetes mellitus (HCC)    • GERD (gastroesophageal reflux disease)    • Hard to intubate     Pt received a letter stating that it was hard to visualize vocal chords   • Heart attack (HCC)    • High cholesterol    • Hypertension    • Myocardial infarction (HCC) 08/2014    Stent   • Wears  glasses        Past Surgical History:   Procedure Laterality Date   • CARDIAC CATHETERIZATION  08/19/2014    Pt reports having one stent placed.   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 Pt reports having allergy shots at that time.       Review of Systems   Constitutional:  Negative for chills and fever.   HENT:  Positive for congestion. Negative for ear pain, hearing loss, nosebleeds, postnasal drip, sinus pressure, sinus pain, sore throat and trouble swallowing.    Eyes:  Negative for discharge, redness and visual disturbance.   Respiratory:  Positive for cough. Negative for shortness of breath.    Cardiovascular:  Negative for chest pain.   Gastrointestinal:  Positive for diarrhea, nausea and vomiting.   Skin:  Positive for wound.   Psychiatric/Behavioral:  Negative for behavioral problems.        Video Exam    Vitals:    03/30/24 1801   Pulse: 92   Temp: 99.8 °F (37.7 °C)   TempSrc: Oral       Physical Exam  Constitutional:       General: He is not in acute distress.     Appearance: He is well-developed.   HENT:      Head: Normocephalic and atraumatic.      Right Ear: External ear normal.      Left Ear: External ear normal.      Nose: No rhinorrhea.      Mouth/Throat:      Mouth: Mucous membranes are moist.      Pharynx: No posterior oropharyngeal erythema.   Eyes:      General: No scleral icterus.     Extraocular Movements: Extraocular movements intact.   Neck:      Trachea: No tracheal deviation.   Cardiovascular:      Rate and Rhythm: Normal rate.   Pulmonary:      Effort: Pulmonary effort is normal. No respiratory distress.      Breath sounds:  No stridor.   Musculoskeletal:      Cervical back: Neck supple.      Comments: Very superficial ulceration on palmar aspect near DIP of right index finger without surrounding erythema   Skin:     Findings: No erythema.   Neurological:      Mental Status: He is alert.   Psychiatric:         Behavior: Behavior normal.         Visit Time  Total Visit Duration: 12 minutes    Assessment/Plan:    Diagnoses and all orders for this visit:    Open wound of finger, initial encounter  -     doxycycline hyclate (VIBRA-TABS) 100 mg tablet; Take 1 tablet (100 mg total) by mouth 2 (two) times a day for 7 days    Bronchitis  -     doxycycline hyclate (VIBRA-TABS) 100 mg tablet; Take 1 tablet (100 mg total) by mouth 2 (two) times a day for 7 days      Pt with adverse side effects of vomiting, diarrhea, nausea to bactrim. He reports he has never had this kind of reaction to an antibiotic before.  Looked at the sensitivities from his finger wound culture.  Staph aureus was sensitive to tetracycline, will change it from Bactrim to doxycycline which should also provide coverage for respiratory pathogen that the PCP might be concerned for.  I explained to the patient that this side effect might still be experienced with the doxycycline as this is common with any antibiotic they should take the antibiotic with food he should also try to eat some yogurt and take some probiotics as well.  Follow-up with his family doctor as scheduled next week.  Proceed to the ER any worsening symptoms.    There are no Patient Instructions on file for this visit.

## 2024-04-13 DIAGNOSIS — E11.00 TYPE 2 DIABETES MELLITUS WITH HYPEROSMOLARITY WITHOUT COMA, WITHOUT LONG-TERM CURRENT USE OF INSULIN (HCC): ICD-10-CM

## 2024-04-15 RX ORDER — SEMAGLUTIDE 1.34 MG/ML
1 INJECTION, SOLUTION SUBCUTANEOUS
Qty: 9 ML | Refills: 1 | Status: SHIPPED | OUTPATIENT
Start: 2024-04-15

## 2024-04-19 ENCOUNTER — APPOINTMENT (OUTPATIENT)
Dept: LAB | Facility: HOSPITAL | Age: 69
End: 2024-04-19

## 2024-04-19 DIAGNOSIS — Z00.8 ENCOUNTER FOR OTHER GENERAL EXAMINATION: ICD-10-CM

## 2024-04-19 LAB
CHOLEST SERPL-MCNC: 131 MG/DL
HDLC SERPL-MCNC: 46 MG/DL
LDLC SERPL CALC-MCNC: 67 MG/DL (ref 0–100)
NONHDLC SERPL-MCNC: 85 MG/DL
TRIGL SERPL-MCNC: 89 MG/DL

## 2024-04-19 PROCEDURE — 36415 COLL VENOUS BLD VENIPUNCTURE: CPT

## 2024-04-19 PROCEDURE — 83036 HEMOGLOBIN GLYCOSYLATED A1C: CPT

## 2024-04-19 PROCEDURE — 80061 LIPID PANEL: CPT

## 2024-04-20 DIAGNOSIS — I10 ESSENTIAL HYPERTENSION: ICD-10-CM

## 2024-04-20 DIAGNOSIS — E11.00 TYPE 2 DIABETES MELLITUS WITH HYPEROSMOLARITY WITHOUT COMA, WITHOUT LONG-TERM CURRENT USE OF INSULIN (HCC): Primary | ICD-10-CM

## 2024-04-20 LAB
EST. AVERAGE GLUCOSE BLD GHB EST-MCNC: 186 MG/DL
HBA1C MFR BLD: 8.1 %

## 2024-04-22 RX ORDER — RAMIPRIL 10 MG/1
10 CAPSULE ORAL DAILY
Qty: 90 CAPSULE | Refills: 2 | Status: SHIPPED | OUTPATIENT
Start: 2024-04-22

## 2024-04-22 RX ORDER — DAPAGLIFLOZIN 10 MG/1
10 TABLET, FILM COATED ORAL DAILY
Qty: 90 TABLET | Refills: 2 | Status: SHIPPED | OUTPATIENT
Start: 2024-04-22

## 2024-04-22 RX ORDER — CARVEDILOL 25 MG/1
25 TABLET ORAL 2 TIMES DAILY WITH MEALS
Qty: 180 TABLET | Refills: 2 | Status: SHIPPED | OUTPATIENT
Start: 2024-04-22

## 2024-05-08 DIAGNOSIS — E11.00 TYPE 2 DIABETES MELLITUS WITH HYPEROSMOLARITY WITHOUT COMA, WITHOUT LONG-TERM CURRENT USE OF INSULIN (HCC): ICD-10-CM

## 2024-05-08 RX ORDER — SEMAGLUTIDE 1.34 MG/ML
1 INJECTION, SOLUTION SUBCUTANEOUS
Qty: 9 ML | Refills: 0 | Status: SHIPPED | OUTPATIENT
Start: 2024-05-08

## 2024-06-13 DIAGNOSIS — I10 ESSENTIAL HYPERTENSION: ICD-10-CM

## 2024-06-13 DIAGNOSIS — F41.9 ANXIETY: ICD-10-CM

## 2024-06-13 DIAGNOSIS — E78.00 HIGH CHOLESTEROL: ICD-10-CM

## 2024-06-14 RX ORDER — CITALOPRAM HYDROBROMIDE 10 MG/1
10 TABLET ORAL DAILY
Qty: 90 TABLET | Refills: 1 | Status: SHIPPED | OUTPATIENT
Start: 2024-06-14

## 2024-06-14 RX ORDER — HYDROCHLOROTHIAZIDE 25 MG/1
25 TABLET ORAL DAILY
Qty: 90 TABLET | Refills: 1 | Status: SHIPPED | OUTPATIENT
Start: 2024-06-14

## 2024-06-14 RX ORDER — ATORVASTATIN CALCIUM 80 MG/1
80 TABLET, FILM COATED ORAL DAILY
Qty: 90 TABLET | Refills: 1 | Status: SHIPPED | OUTPATIENT
Start: 2024-06-14

## 2024-07-12 ENCOUNTER — OFFICE VISIT (OUTPATIENT)
Dept: FAMILY MEDICINE CLINIC | Facility: CLINIC | Age: 69
End: 2024-07-12
Payer: COMMERCIAL

## 2024-07-12 VITALS
DIASTOLIC BLOOD PRESSURE: 84 MMHG | HEIGHT: 67 IN | BODY MASS INDEX: 31.56 KG/M2 | OXYGEN SATURATION: 98 % | WEIGHT: 201.1 LBS | SYSTOLIC BLOOD PRESSURE: 130 MMHG | HEART RATE: 74 BPM

## 2024-07-12 DIAGNOSIS — K21.9 GASTROESOPHAGEAL REFLUX DISEASE WITHOUT ESOPHAGITIS: ICD-10-CM

## 2024-07-12 DIAGNOSIS — Z00.00 ANNUAL PHYSICAL EXAM: ICD-10-CM

## 2024-07-12 DIAGNOSIS — E11.9 TYPE 2 DIABETES MELLITUS WITHOUT COMPLICATION, WITHOUT LONG-TERM CURRENT USE OF INSULIN (HCC): Primary | ICD-10-CM

## 2024-07-12 DIAGNOSIS — R40.0 HAS DAYTIME DROWSINESS: ICD-10-CM

## 2024-07-12 DIAGNOSIS — I10 PRIMARY HYPERTENSION: ICD-10-CM

## 2024-07-12 DIAGNOSIS — R06.83 SNORING: ICD-10-CM

## 2024-07-12 DIAGNOSIS — R06.81 WITNESSED EPISODE OF APNEA: ICD-10-CM

## 2024-07-12 LAB
SL AMB POCT HEMOGLOBIN AIC: 6.3 (ref ?–6.5)
SL AMB POCT HEMOGLOBIN AIC: 8 (ref ?–6.5)

## 2024-07-12 PROCEDURE — 83036 HEMOGLOBIN GLYCOSYLATED A1C: CPT | Performed by: NURSE PRACTITIONER

## 2024-07-12 PROCEDURE — 99397 PER PM REEVAL EST PAT 65+ YR: CPT | Performed by: NURSE PRACTITIONER

## 2024-07-12 NOTE — PROGRESS NOTES
Adult Annual Physical  Name: Dawson Jane      : 1955      MRN: [id number]  Encounter Provider: ISIDRO Hendricks  Encounter Date: 2024   Encounter department: [facility] PRIMARY CARE    Assessment & Plan   1. Type 2 diabetes mellitus without complication, without long-term current use of insulin (HCC)  -     POCT hemoglobin A1c  2. Gastroesophageal reflux disease without esophagitis  3. Snoring  -     Ambulatory Referral to Sleep Medicine; Future  4. Has daytime drowsiness  -     Ambulatory Referral to Sleep Medicine; Future  5. Witnessed episode of apnea  -     Ambulatory Referral to Sleep Medicine; Future  6. Annual physical exam  7. BMI 31.0-31.9,adult    Immunizations and preventive care screenings were discussed with patient today. Appropriate education was printed on patient's after visit summary.        Counseling:  General health         History of Present Illness     Adult Annual Physical:  Patient presents for annual physical. Here for a physical - hx of T2DM - no acute issues.      Had eye exam in 2023    .     Diet and Physical Activity:  - Diet/Nutrition: well balanced diet.  - Exercise: no formal exercise.    General Health:  - Sleep: sleeps well.  - Hearing: normal hearing right ear and normal hearing left ear.  - Vision: no vision problems.  - Dental: regular dental visits.     Health:    - Urinary symptoms: none.     Advanced Care Planning:  - Has an advanced directive?: no    - Has a durable medical POA?: no      Review of Systems  Current Outpatient Medications on File Prior to Visit   Medication Sig Dispense Refill    aspirin (ECOTRIN LOW STRENGTH) 81 mg EC tablet Take 81 mg by mouth every morning      atorvastatin (LIPITOR) 80 mg tablet Take 1 tablet (80 mg total) by mouth daily 90 tablet 1    carvedilol (COREG) 25 mg tablet Take 1 tablet (25 mg total) by mouth 2 (two) times a day with meals 180 tablet 2    citalopram (CeleXA) 10 mg tablet Take 1  "tablet (10 mg total) by mouth daily 90 tablet 1    Coenzyme Q10 (CoQ-10) 100 MG CAPS Take 100 mg by mouth daily       dapagliflozin (Farxiga) 10 MG tablet Take 1 tablet (10 mg total) by mouth daily 90 tablet 2    esomeprazole (NexIUM) 5 MG packet Take 5 mg by mouth daily 90 each 1    hydroCHLOROthiazide 25 mg tablet Take 1 tablet (25 mg total) by mouth daily 90 tablet 1    hydrocortisone (ANUSOL-HC) 25 mg suppository Insert 1 suppository (25 mg total) into the rectum 2 (two) times a day as needed for hemorrhoids 12 suppository 1    Multiple Vitamins-Minerals (PreserVision AREDS 2) CAPS Take 1 capsule by mouth daily       mupirocin (BACTROBAN) 2 % ointment Apply topically 3 (three) times a day 50 g 0    Ozempic, 1 MG/DOSE, 4 MG/3ML injection pen Inject 0.75 mL (1 mg total) under the skin every 7 days 9 mL 0    ramipril (ALTACE) 10 MG capsule Take 1 capsule (10 mg total) by mouth daily 90 capsule 2     No current facility-administered medications on file prior to visit.        Objective     /84 (BP Location: Left arm, Patient Position: Sitting, Cuff Size: Large)   Pulse 74   Ht 5' 7\" (1.702 m)   Wt 91.2 kg (201 lb 1.6 oz)   SpO2 98%   BMI 31.50 kg/m²     Physical Exam  Constitutional:       Appearance: Normal appearance.   HENT:      Head: Normocephalic and atraumatic.   Cardiovascular:      Rate and Rhythm: Normal rate and regular rhythm.      Pulses:           Dorsalis pedis pulses are 2+ on the right side and 2+ on the left side.      Heart sounds: No murmur heard.     No gallop.   Pulmonary:      Effort: Pulmonary effort is normal. No respiratory distress.      Breath sounds: Normal breath sounds. No wheezing or rales.   Musculoskeletal:      Cervical back: Neck supple.   Feet:      Right foot:      Skin integrity: No ulcer, skin breakdown, erythema, warmth, callus or dry skin.      Left foot:      Skin integrity: No ulcer, skin breakdown, erythema, warmth, callus or dry skin.   Neurological:      " General: No focal deficit present.      Mental Status: He is alert and oriented to person, place, and time. Mental status is at baseline.   Psychiatric:         Mood and Affect: Mood normal.         Behavior: Behavior normal.         Thought Content: Thought content normal.         Judgment: Judgment normal.       Diabetic Foot Exam    Patient's shoes and socks removed.    Right Foot/Ankle   Right Foot Inspection  Skin Exam: skin normal and skin intact. No dry skin, no warmth, no callus, no erythema, no maceration, no abnormal color, no pre-ulcer, no ulcer and no callus.     Toe Exam: ROM and strength within normal limits.     Sensory   Monofilament testing: intact    Vascular  The right DP pulse is 2+.     Left Foot/Ankle  Left Foot Inspection  Skin Exam: skin normal and skin intact. No dry skin, no warmth, no erythema, no maceration, normal color, no pre-ulcer, no ulcer and no callus.     Toe Exam: ROM and strength within normal limits.     Sensory   Monofilament testing: intact    Vascular  The left DP pulse is 2+.

## 2024-07-12 NOTE — PATIENT INSTRUCTIONS
"Patient Education     Routine physical for adults   The Basics   Written by the doctors and editors at [facility]   What is a physical? -- A physical is a routine visit, or \"check-up,\" with your doctor. You might also hear it called a \"wellness visit\" or \"preventive visit.\"  During each visit, the doctor will:   Ask about your physical and mental health   Ask about your habits, behaviors, and lifestyle   Do an exam   Give you vaccines if needed   Talk to you about any medicines you take   Give advice about your health   Answer your questions  Getting regular check-ups is an important part of taking care of your health. It can help your doctor find and treat any problems you have. But it's also important for preventing health problems.  A routine physical is different from a \"sick visit.\" A sick visit is when you see a doctor because of a health concern or problem. Since physicals are scheduled ahead of time, you can think about what you want to ask the doctor.  How often should I get a physical? -- It depends on your age and health. In general, for people age 21 years and older:   If you are younger than 50 years, you might be able to get a physical every 3 years.   If you are 50 years or older, your doctor might recommend a physical every year.  If you have an ongoing health condition, like diabetes or high blood pressure, your doctor will probably want to see you more often.  What happens during a physical? -- In general, each visit will include:   Physical exam - The doctor or nurse will check your height, weight, heart rate, and blood pressure. They will also look at your eyes and ears. They will ask about how you are feeling and whether you have any symptoms that bother you.   Medicines - It's a good idea to bring a list of all the medicines you take to each doctor visit. Your doctor will talk to you about your medicines and answer any questions. Tell them if you are having any side effects that bother you. You " "should also tell them if you are having trouble paying for any of your medicines.   Habits and behaviors - This includes:   Your diet   Your exercise habits   Whether you smoke, drink alcohol, or use drugs   Whether you are sexually active   Whether you feel safe at home  Your doctor will talk to you about things you can do to improve your health and lower your risk of health problems. They will also offer help and support. For example, if you want to quit smoking, they can give you advice and might prescribe medicines. If you want to improve your diet or get more physical activity, they can help you with this, too.   Lab tests, if needed - The tests you get will depend on your age and situation. For example, your doctor might want to check your:   Cholesterol   Blood sugar   Iron level   Vaccines - The recommended vaccines will depend on your age, health, and what vaccines you already had. Vaccines are very important because they can prevent certain serious or deadly infections.   Discussion of screening - \"Screening\" means checking for diseases or other health problems before they cause symptoms. Your doctor can recommend screening based on your age, risk, and preferences. This might include tests to check for:   Cancer, such as breast, prostate, cervical, ovarian, colorectal, prostate, lung, or skin cancer   Sexually transmitted infections, such as chlamydia and gonorrhea   Mental health conditions like depression and anxiety  Your doctor will talk to you about the different types of screening tests. They can help you decide which screenings to have. They can also explain what the results might mean.   Answering questions - The physical is a good time to ask the doctor or nurse questions about your health. If needed, they can refer you to other doctors or specialists, too.  Adults older than 65 years often need other care, too. As you get older, your doctor will talk to you about:   How to prevent falling at " home   Hearing or vision tests   Memory testing   How to take your medicines safely   Making sure that you have the help and support you need at home  All topics are updated as new evidence becomes available and our peer review process is complete.  This topic retrieved from [employer] on: May 02, 2024.  Topic 210148 Version 1.0  Release: 32.4.3 - C32.122  © 2024 UpToDate, Inc. and/or its affiliates. All rights reserved.  Consumer Information Use and Disclaimer   Disclaimer: This generalized information is a limited summary of diagnosis, treatment, and/or medication information. It is not meant to be comprehensive and should be used as a tool to help the user understand and/or assess potential diagnostic and treatment options. It does NOT include all information about conditions, treatments, medications, side effects, or risks that may apply to a specific patient. It is not intended to be medical advice or a substitute for the medical advice, diagnosis, or treatment of a health care provider based on the health care provider's examination and assessment of a patient's specific and unique circumstances. Patients must speak with a health care provider for complete information about their health, medical questions, and treatment options, including any risks or benefits regarding use of medications. This information does not endorse any treatments or medications as safe, effective, or approved for treating a specific patient. UpToDate, Inc. and its affiliates disclaim any warranty or liability relating to this information or the use thereof.The use of this information is governed by the Terms of Use, available at https://www.w[employer] labsuwer.com/en/know/clinical-effectiveness-terms. 2024© UpToDate, Inc. and its affiliates and/or licensors. All rights reserved.  Copyright   © 2024 UpToDate, Inc. and/or its affiliates. All rights reserved.

## 2024-07-16 DIAGNOSIS — R40.0 HAS DAYTIME DROWSINESS: Primary | ICD-10-CM

## 2024-07-16 DIAGNOSIS — R06.81 WITNESSED EPISODE OF APNEA: ICD-10-CM

## 2024-07-16 DIAGNOSIS — R06.83 SNORING: ICD-10-CM

## 2024-07-22 DIAGNOSIS — Z00.6 ENCOUNTER FOR EXAMINATION FOR NORMAL COMPARISON OR CONTROL IN CLINICAL RESEARCH PROGRAM: ICD-10-CM

## 2024-07-23 ENCOUNTER — APPOINTMENT (OUTPATIENT)
Dept: LAB | Facility: HOSPITAL | Age: 69
End: 2024-07-23

## 2024-07-23 DIAGNOSIS — Z00.6 ENCOUNTER FOR EXAMINATION FOR NORMAL COMPARISON OR CONTROL IN CLINICAL RESEARCH PROGRAM: ICD-10-CM

## 2024-07-23 PROCEDURE — 36415 COLL VENOUS BLD VENIPUNCTURE: CPT

## 2024-07-25 DIAGNOSIS — I10 ESSENTIAL HYPERTENSION: ICD-10-CM

## 2024-07-25 RX ORDER — CARVEDILOL 25 MG/1
25 TABLET ORAL 2 TIMES DAILY WITH MEALS
Qty: 180 TABLET | Refills: 2 | Status: SHIPPED | OUTPATIENT
Start: 2024-07-25

## 2024-08-04 DIAGNOSIS — E11.00 TYPE 2 DIABETES MELLITUS WITH HYPEROSMOLARITY WITHOUT COMA, WITHOUT LONG-TERM CURRENT USE OF INSULIN (HCC): ICD-10-CM

## 2024-08-05 ENCOUNTER — HOSPITAL ENCOUNTER (OUTPATIENT)
Dept: SLEEP CENTER | Facility: HOSPITAL | Age: 69
Discharge: HOME | End: 2024-08-05
Payer: COMMERCIAL

## 2024-08-05 DIAGNOSIS — R40.0 HAS DAYTIME DROWSINESS: ICD-10-CM

## 2024-08-05 DIAGNOSIS — R06.83 SNORING: ICD-10-CM

## 2024-08-05 DIAGNOSIS — R06.81 WITNESSED EPISODE OF APNEA: ICD-10-CM

## 2024-08-05 PROCEDURE — G0399 HOME SLEEP TEST/TYPE 3 PORTA: HCPCS

## 2024-08-05 RX ORDER — SEMAGLUTIDE 1.34 MG/ML
1 INJECTION, SOLUTION SUBCUTANEOUS
Qty: 9 ML | Refills: 0 | Status: SHIPPED | OUTPATIENT
Start: 2024-08-05

## 2024-08-05 NOTE — PROGRESS NOTES
Home Sleep Study Documentation    HOME STUDY DEVICE: Noxturnal no                                           Nurys G3 yes      Pre-Sleep Home Study:    Set-up and instructions performed by: SIXTO Daly    Technician performed demonstration for Patient: yes    Return demonstration performed by Patient: yes    Written instructions provided to Patient: yes    Patient signed consent form: yes        Post-Sleep Home Study:    Additional comments by Patient:       Home Sleep Study Failed:no:    Failure reason: N/A    Reported or Detected: N/A    Scored by: JUAN JOSE Dimas

## 2024-08-06 LAB
APOB+LDLR+PCSK9 GENE MUT ANL BLD/T: NOT DETECTED
BRCA1+BRCA2 DEL+DUP + FULL MUT ANL BLD/T: NOT DETECTED
MLH1+MSH2+MSH6+PMS2 GN DEL+DUP+FUL M: NOT DETECTED

## 2024-08-07 PROBLEM — G47.33 OSA (OBSTRUCTIVE SLEEP APNEA): Status: ACTIVE | Noted: 2024-08-07

## 2024-08-21 ENCOUNTER — TELEPHONE (OUTPATIENT)
Dept: FAMILY MEDICINE CLINIC | Facility: CLINIC | Age: 69
End: 2024-08-21

## 2024-08-21 NOTE — TELEPHONE ENCOUNTER
----- Message from ISIDRO Hendricks sent at 8/21/2024  1:51 PM EDT -----  Can we reach out to Dawson - I was able to review the sleep study results.  The test indicated moderate obstructive sleep apnea.  Abdirizak we please ask him - I have sleep apnea already in his chart - has he been diagnosed before and has he used a CPAP a  nd this is for a replacement?  Thanks.

## 2024-08-22 ENCOUNTER — TELEPHONE (OUTPATIENT)
Dept: FAMILY MEDICINE CLINIC | Facility: CLINIC | Age: 69
End: 2024-08-22

## 2024-08-22 DIAGNOSIS — G47.33 MODERATE OBSTRUCTIVE SLEEP APNEA: Primary | ICD-10-CM

## 2024-08-22 NOTE — TELEPHONE ENCOUNTER
----- Message from ISIDRO Hendricks sent at 8/22/2024 11:04 AM EDT -----  Option are for him that I can refer him to sleep medicine only - may take a few months to get in, or I can refer and also order him an auto CPAP to use until he sees sleep medicine.  The CPAP is ordered through medical supply  - and the company for i  t helps set him up etc.

## 2024-08-22 NOTE — TELEPHONE ENCOUNTER
Patient called back - provided the recommendation of his sleep study results from Raisa verbatim.     Patient requested for the CPAP to be ordered and referral submitted to sleep medicine.

## 2024-08-22 NOTE — TELEPHONE ENCOUNTER
Ok, the CPAP was ordered via our [place] system so the company will contact him.  I also placed the referral for sleep medicine, they usually reach out to the patient for scheduling.

## 2024-08-26 LAB

## 2024-09-04 LAB
DME PARACHUTE DELIVERY DATE ACTUAL: NORMAL
DME PARACHUTE DELIVERY DATE EXPECTED: NORMAL
DME PARACHUTE DELIVERY DATE REQUESTED: NORMAL
DME PARACHUTE ITEM DESCRIPTION: NORMAL
DME PARACHUTE ORDER STATUS: NORMAL
DME PARACHUTE SUPPLIER NAME: NORMAL
DME PARACHUTE SUPPLIER PHONE: NORMAL

## 2024-09-13 DIAGNOSIS — I10 ESSENTIAL HYPERTENSION: ICD-10-CM

## 2024-09-13 DIAGNOSIS — E78.00 HIGH CHOLESTEROL: ICD-10-CM

## 2024-09-13 DIAGNOSIS — F41.9 ANXIETY: ICD-10-CM

## 2024-09-13 RX ORDER — HYDROCHLOROTHIAZIDE 25 MG/1
25 TABLET ORAL DAILY
Qty: 90 TABLET | Refills: 1 | Status: SHIPPED | OUTPATIENT
Start: 2024-09-13

## 2024-09-13 RX ORDER — ATORVASTATIN CALCIUM 80 MG/1
80 TABLET, FILM COATED ORAL DAILY
Qty: 90 TABLET | Refills: 1 | Status: SHIPPED | OUTPATIENT
Start: 2024-09-13

## 2024-09-13 RX ORDER — CITALOPRAM HYDROBROMIDE 10 MG/1
10 TABLET ORAL DAILY
Qty: 90 TABLET | Refills: 1 | Status: SHIPPED | OUTPATIENT
Start: 2024-09-13

## 2024-09-23 ENCOUNTER — OFFICE VISIT (OUTPATIENT)
Dept: SLEEP CENTER | Facility: CLINIC | Age: 69
End: 2024-09-23
Payer: COMMERCIAL

## 2024-09-23 VITALS
HEIGHT: 67 IN | HEART RATE: 81 BPM | WEIGHT: 204 LBS | BODY MASS INDEX: 32.02 KG/M2 | SYSTOLIC BLOOD PRESSURE: 145 MMHG | DIASTOLIC BLOOD PRESSURE: 72 MMHG

## 2024-09-23 DIAGNOSIS — G47.33 MODERATE OBSTRUCTIVE SLEEP APNEA: Primary | ICD-10-CM

## 2024-09-23 DIAGNOSIS — R09.02 HYPOXEMIA: ICD-10-CM

## 2024-09-23 PROCEDURE — 99244 OFF/OP CNSLTJ NEW/EST MOD 40: CPT | Performed by: PSYCHIATRY & NEUROLOGY

## 2024-09-23 NOTE — PROGRESS NOTES
Assessment/Plan:    1. Moderate obstructive sleep apnea  -     Ambulatory Referral to Sleep Medicine  -     Pulse oximetry overnight  2. Hypoxemia  -     XR chest pa and lateral; Future; Expected date: 09/23/2024  -     Complete PFT with post bronchodilator; Future      We reviewed his sleep study in detail, I showed him the raw data.  I also reviewed his CPAP data to date and he is doing exceptionally well with treatment, he has a normal AHI.  We discussed he had hypoxia on his overnight sleep study, the cause is not entirely clear at present.  I ordered pulmonary function test as well as a chest x-ray to start the workup.  He has a prior history of cigarette smoking which potentially could relate.    Regarding hypoxia, initially I had recommended a CPAP titration study.  Because he is doing so well with auto CPAP, I would like him to have a home oximetry test with use of CPAP to see if oxygenation is normalized.  If hypoxia persist, he will need an in lab study to further optimize CPAP settings.    I will follow-up with him in a few months for a compliance visit at this visit occurred before the 30th day of treatment.    He describes some mask discomfort, will ask him to meet with the medical supply company.    He lives quite a distance to my office so like we will plan for a virtual follow-up    Subjective:      Patient ID: Dawson Jane is a 69 y.o. male.    HPI  This is a 69-year-old male who presents in initial consultation after a recent abnormal home sleep test.  That test showed a respiratory event index of 23.5.  Of 160 respiratory events he had 79 obstructive apneas, 0 central and mixed apneas, 81 hypopneas.  Oxygen saturations were less than 89% for a total of 58 minutes.  Past medical history includes hypertension, coronary artery disease, diabetes, gastroesophageal reflux disease, anxiety.    Kayla describes that the sleep study was ordered due to loud snoring.  He also had a degree of sleepiness  which was not as much of a concern but something that has significant improved since he started CPAP.    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    He typically goes to bed at 11:30 PM, falls asleep without much difficulty.  Awakens a few times during the night but falls back asleep easily and is up at 8 AM.  He sleeps about 7 hours a night.    He is usually refreshed upon awakening but sometimes sleepy during the day.  He takes naps a few days out of the week.  He rarely dozes now since using CPAP, this happened a lot before using CPAP.      He has loud snoring, breathing pauses and choking.Has snored for many years.  Has been told he had breathing pauses in the past. Sometimes does not breathe well through his nose.      Denies symptoms of restless legs, sleepwalking, or dream enactment.    He drinks 2-3 beers at night.  Coffee- 1 cup a day    CPAP data reviewed today-AirSense 11 set at 5 to 20 cm H2O  Usage 19 over 19 days, average session 8 hours 2 minutes  AHI 3.6  95% pressure-13.5 cm H2O  95% mask leak is slightly high  Uses a FFM         [place] Sleepiness Scale  Sitting and reading: Moderate chance of dozing  Watching TV: Would never doze  Sitting, inactive in a public place (e.g. a theatre or a meeting): Would never doze  As a passenger in a car for an hour without a break: Slight chance of dozing  Lying down to rest in the afternoon when circumstances permit: Moderate chance of dozing  Sitting and talking to someone: Would never doze  Sitting quietly after a lunch without alcohol: Slight chance of dozing  In a car, while stopped for a few minutes in traffic: Would never doze  Total score: 6      Review of Systems  (as above unless noted)  No shortness of breath.  No coughing or wheezing    Objective:      Visit Vitals  /72 (BP Location: Left arm,  "Patient Position: Sitting, Cuff Size: Large)   Pulse 81   Ht 5' 7\" (1.702 m)   Wt 92.5 kg (204 lb)   BMI 31.95 kg/m²   Smoking Status Former   BSA 2.04 m²             Physical Exam  Constitutional:       Appearance: Normal appearance.   HENT:      Head: Normocephalic and atraumatic.      Mouth/Throat:      Mouth: Mucous membranes are moist.   Eyes:      Extraocular Movements: Extraocular movements intact.      Pupils: Pupils are equal, round, and reactive to light.   Cardiovascular:      Rate and Rhythm: Normal rate.      Pulses: Normal pulses.      Heart sounds: Normal heart sounds.   Pulmonary:      Effort: Pulmonary effort is normal.      Breath sounds: Normal breath sounds.   Musculoskeletal:      Right lower leg: No edema.      Left lower leg: No edema.   Neurological:      Mental Status: He is alert.   Psychiatric:         Mood and Affect: Mood normal.         Behavior: Behavior normal.         Thought Content: Thought content normal.         Judgment: Judgment normal.         Mallampati Grade : Mallampati 4      palate and uvula: Low soft palate        "

## 2024-09-24 ENCOUNTER — TELEPHONE (OUTPATIENT)
Dept: SLEEP CENTER | Facility: CLINIC | Age: 69
End: 2024-09-24

## 2024-09-24 ENCOUNTER — HOSPITAL ENCOUNTER (OUTPATIENT)
Dept: RADIOLOGY | Facility: HOSPITAL | Age: 69
Discharge: HOME | End: 2024-09-24
Payer: COMMERCIAL

## 2024-09-24 DIAGNOSIS — R09.02 HYPOXEMIA: ICD-10-CM

## 2024-09-24 LAB
DME PARACHUTE DELIVERY DATE REQUESTED: NORMAL
DME PARACHUTE ITEM DESCRIPTION: NORMAL
DME PARACHUTE ORDER STATUS: NORMAL
DME PARACHUTE SUPPLIER NAME: NORMAL
DME PARACHUTE SUPPLIER PHONE: NORMAL

## 2024-09-24 PROCEDURE — 71046 X-RAY EXAM CHEST 2 VIEWS: CPT

## 2024-10-01 ENCOUNTER — HOSPITAL ENCOUNTER (OUTPATIENT)
Dept: PULMONOLOGY | Facility: HOSPITAL | Age: 69
Discharge: HOME | End: 2024-10-01
Payer: COMMERCIAL

## 2024-10-01 DIAGNOSIS — R09.02 HYPOXEMIA: ICD-10-CM

## 2024-10-01 DIAGNOSIS — K64.1 GRADE II HEMORRHOIDS: ICD-10-CM

## 2024-10-01 DIAGNOSIS — K62.5 RECTAL BLEEDING: ICD-10-CM

## 2024-10-01 LAB
DME PARACHUTE DELIVERY DATE ACTUAL: NORMAL
DME PARACHUTE DELIVERY DATE REQUESTED: NORMAL
DME PARACHUTE ITEM DESCRIPTION: NORMAL
DME PARACHUTE ORDER STATUS: NORMAL
DME PARACHUTE SUPPLIER NAME: NORMAL
DME PARACHUTE SUPPLIER PHONE: NORMAL

## 2024-10-01 PROCEDURE — 94060 EVALUATION OF WHEEZING: CPT

## 2024-10-01 PROCEDURE — 94726 PLETHYSMOGRAPHY LUNG VOLUMES: CPT

## 2024-10-01 PROCEDURE — 94760 N-INVAS EAR/PLS OXIMETRY 1: CPT

## 2024-10-01 PROCEDURE — 94729 DIFFUSING CAPACITY: CPT

## 2024-10-01 RX ORDER — HYDROCORTISONE ACETATE 25 MG/1
25 SUPPOSITORY RECTAL 2 TIMES DAILY PRN
Qty: 12 SUPPOSITORY | Refills: 0 | Status: SHIPPED | OUTPATIENT
Start: 2024-10-01

## 2024-10-01 RX ORDER — ALBUTEROL SULFATE 0.83 MG/ML
2.5 SOLUTION RESPIRATORY (INHALATION) ONCE AS NEEDED
Status: COMPLETED | OUTPATIENT
Start: 2024-10-01 | End: 2024-10-01

## 2024-10-01 RX ADMIN — ALBUTEROL SULFATE 2.5 MG: 2.5 SOLUTION RESPIRATORY (INHALATION) at 08:11

## 2024-10-03 ENCOUNTER — PATIENT MESSAGE (OUTPATIENT)
Dept: SLEEP CENTER | Facility: CLINIC | Age: 69
End: 2024-10-03

## 2024-10-22 ENCOUNTER — TELEPHONE (OUTPATIENT)
Dept: SLEEP CENTER | Facility: CLINIC | Age: 69
End: 2024-10-22

## 2024-10-22 DIAGNOSIS — G47.33 MODERATE OBSTRUCTIVE SLEEP APNEA: Primary | ICD-10-CM

## 2024-10-22 NOTE — TELEPHONE ENCOUNTER
Received streamit message from patient:  Was wondering if my overnight test with o2 test results are in ?     Email sent to Joanne Dietz at [facility] requesting ARNEL results.     Awaiting reply.

## 2024-10-27 DIAGNOSIS — E11.00 TYPE 2 DIABETES MELLITUS WITH HYPEROSMOLARITY WITHOUT COMA, WITHOUT LONG-TERM CURRENT USE OF INSULIN (HCC): ICD-10-CM

## 2024-10-28 DIAGNOSIS — E11.00 TYPE 2 DIABETES MELLITUS WITH HYPEROSMOLARITY WITHOUT COMA, WITHOUT LONG-TERM CURRENT USE OF INSULIN (HCC): ICD-10-CM

## 2024-10-29 ENCOUNTER — TELEPHONE (OUTPATIENT)
Age: 69
End: 2024-10-29

## 2024-10-29 RX ORDER — SEMAGLUTIDE 1.34 MG/ML
1 INJECTION, SOLUTION SUBCUTANEOUS
Qty: 9 ML | Refills: 0 | Status: SHIPPED | OUTPATIENT
Start: 2024-10-29

## 2024-10-29 RX ORDER — SEMAGLUTIDE 1.34 MG/ML
INJECTION, SOLUTION SUBCUTANEOUS
Qty: 9 ML | Refills: 0 | Status: SHIPPED | OUTPATIENT
Start: 2024-10-29

## 2024-10-29 NOTE — TELEPHONE ENCOUNTER
PA for Ozempic 1MG/DOSE 4MG/3ML SUBMITTED     via    []CMM-KEY:   [x]Surescripts-Case ID # [id number]   []Availity-Auth ID # NDC #   []Faxed to plan   []Other website   []Phone call Case ID #     Office notes sent, clinical questions answered. Awaiting determination    Turnaround time for your insurance to make a decision on your Prior Authorization can take 7-21 business days.

## 2024-10-30 LAB
LEFT EYE DIABETIC RETINOPATHY: NORMAL
RIGHT EYE DIABETIC RETINOPATHY: NORMAL

## 2024-10-30 NOTE — TELEPHONE ENCOUNTER
PA for Ozempic 1MG/DOSE 4MG/3ML NOT REQUIRED     Reason:      Pharmacy advised by    [x]Fax  []Phone call

## 2024-11-12 ENCOUNTER — OFFICE VISIT (OUTPATIENT)
Dept: FAMILY MEDICINE CLINIC | Facility: CLINIC | Age: 69
End: 2024-11-12
Payer: COMMERCIAL

## 2024-11-12 VITALS
BODY MASS INDEX: 31.71 KG/M2 | HEIGHT: 67 IN | DIASTOLIC BLOOD PRESSURE: 80 MMHG | WEIGHT: 202 LBS | SYSTOLIC BLOOD PRESSURE: 130 MMHG | OXYGEN SATURATION: 97 % | HEART RATE: 84 BPM

## 2024-11-12 DIAGNOSIS — I10 PRIMARY HYPERTENSION: ICD-10-CM

## 2024-11-12 DIAGNOSIS — M54.2 POSTERIOR NECK PAIN: ICD-10-CM

## 2024-11-12 DIAGNOSIS — E11.9 TYPE 2 DIABETES MELLITUS WITHOUT COMPLICATION, WITHOUT LONG-TERM CURRENT USE OF INSULIN (HCC): Primary | ICD-10-CM

## 2024-11-12 DIAGNOSIS — F41.9 ANXIETY: ICD-10-CM

## 2024-11-12 DIAGNOSIS — K21.9 GASTROESOPHAGEAL REFLUX DISEASE WITHOUT ESOPHAGITIS: ICD-10-CM

## 2024-11-12 DIAGNOSIS — G47.33 OSA (OBSTRUCTIVE SLEEP APNEA): ICD-10-CM

## 2024-11-12 DIAGNOSIS — E78.00 HIGH CHOLESTEROL: ICD-10-CM

## 2024-11-12 LAB — SL AMB POCT HEMOGLOBIN AIC: 7.9 (ref ?–6.5)

## 2024-11-12 PROCEDURE — 99214 OFFICE O/P EST MOD 30 MIN: CPT | Performed by: NURSE PRACTITIONER

## 2024-11-12 PROCEDURE — 83036 HEMOGLOBIN GLYCOSYLATED A1C: CPT | Performed by: NURSE PRACTITIONER

## 2024-11-12 NOTE — PROGRESS NOTES
"Ambulatory Visit  Name: Dawson Jane      : 1955      MRN: [id number]  Encounter Provider: ISIDRO Hendricks  Encounter Date: 2024   Encounter department: [facility]    Assessment & Plan  Type 2 diabetes mellitus without complication, without long-term current use of insulin (HCC)    Current HA1C 7.9    Orders:    POCT hemoglobin A1c    Posterior neck pain  Referral placed.    Orders:    Ambulatory Referral to Physical Therapy; Future    High cholesterol  Controlled, on statin       Anxiety  Stable       Primary hypertension  BP within range today       Gastroesophageal reflux disease without esophagitis  Reports much improved with use of PCP       ALETA (obstructive sleep apnea)  Using CPAP - having issues with the back of his neck - hurting when he wears his mask.            History of Present Illness     Here for a follow-up - he notes of posterior neck pain when laying in bed with his CPAP mask on.  He has been through multiple types of masks - This one is also bothering him.      Hx of chronic issues - no acute problems.            Review of Systems   Constitutional: Negative.    Respiratory: Negative.     Cardiovascular: Negative.    Musculoskeletal:  Positive for neck pain.   Neurological: Negative.    All other systems reviewed and are negative.          Objective     /80 (BP Location: Left arm, Patient Position: Sitting, Cuff Size: Standard)   Pulse 84   Ht 5' 7\" (1.702 m)   Wt 91.6 kg (202 lb)   SpO2 97%   BMI 31.64 kg/m²     Physical Exam  Pulmonary:      Effort: Pulmonary effort is normal.      Breath sounds: Normal breath sounds.   Neurological:      Mental Status: He is alert and oriented to person, place, and time.         "

## 2024-11-13 ENCOUNTER — EVALUATION (OUTPATIENT)
Dept: PHYSICAL THERAPY | Facility: CLINIC | Age: 69
End: 2024-11-13
Payer: COMMERCIAL

## 2024-11-13 DIAGNOSIS — M54.2 POSTERIOR NECK PAIN: ICD-10-CM

## 2024-11-13 PROCEDURE — 97535 SELF CARE MNGMENT TRAINING: CPT

## 2024-11-13 PROCEDURE — 97161 PT EVAL LOW COMPLEX 20 MIN: CPT

## 2024-11-13 NOTE — PROGRESS NOTES
PT Evaluation     Today's date: 2024  Patient name: Dawson Jane  : 1955  MRN: [id number]  Referring provider: Raisa Michelle, *  Dx:   Encounter Diagnosis     ICD-10-CM    1. Posterior neck pain  M54.2 Ambulatory Referral to Physical Therapy          Start Time: 1100  Stop Time: 1130  Total time in clinic (min): 30 minutes    Assessment  Impairments: abnormal or restricted ROM, impaired physical strength, lacks appropriate home exercise program, pain with function, participation limitations and activity limitations    Assessment details: Patient 68 y/o presenting to OP PT w/ c/o neck pain. Upon evaluation, he presents with good BUE strength, decreased cervical AROM and pain at end range, hypomobility within cervical and thoracic spine, forward flexed posture and poor deep neck flexor endurance, but no radicular symptoms into BUE with special testing. Patient was educated on HEP for postural awareness and deep neck flexor strengthening with positive response post education. Patient would benefit form skilled PT to improve postural control and cervical mobility to reduce pain with functional mobility.    Goals  STG to be met within 4 weeks:  1. Pt will report 3/10 neck pain at worst to improve QOL.  2. Pt will increase B shoulder strength by 1/2 muscle grade to increase functional mobility.  3. Pt will be independent with HEP to improve cervical motion and reduce pain.  4. Pt will improve FOTO score by 10 points in order to improve functional mobility and quality of life.      LTG to be met within 8 weeks:  1. Pt will restore cervical AROM to WNL to improve activity tolerance and reduce pain with mobility.  2. Pt will restore B shoulder strength to WNL to improve functional mobility and return to normal work duties.   3. Pt will meet FOTO score goal by DC to improve functional mobility and return to PLOF.   4. Pt will be I in HEP for DC.         Plan  Patient would benefit from: skilled  physical therapy and PT eval  Planned modality interventions: cryotherapy and thermotherapy: hydrocollator packs    Planned therapy interventions: flexibility, home exercise program, functional ROM exercises, joint mobilization, manual therapy, neuromuscular re-education, patient/caregiver education, self care, postural training, strengthening, stretching and therapeutic exercise    Frequency: 2x week  Duration in weeks: 6  Treatment plan discussed with: patient        Subjective Evaluation    History of Present Illness  Mechanism of injury: Patient reports he has been having neck pain for quite some time but feels in the last 2 months since being diagnosed with sleep apnea and having to use a CPAP. He states he has always had neck pain but never complained of radiating symptoms until using the CPAP, now he has pain radiating between his shoulder blades. Patient denies reports of radiating symptoms into BUE or N/T.   Patient Goals  Patient goals for therapy: decreased pain and increased strength    Pain  Current pain ratin  At best pain ratin  At worst pain ratin  Quality: dull ache and sharp  Relieving factors: medications  Progression: worsening    Treatments  Current treatment: physical therapy        Objective     Postural Observations  Seated posture: poor  Standing posture: poor      Palpation   Left   Tenderness of the cervical paraspinals, suboccipitals and upper trapezius.     Right   Tenderness of the cervical paraspinals, suboccipitals and upper trapezius.     Active Range of Motion   Cervical/Thoracic Spine       Cervical    Flexion: 55 degrees  with pain  Extension: 32 degrees     with pain  Left lateral flexion: 35 degrees     with pain  Right lateral flexion: 35 degrees     with pain  Left rotation: 55 degrees with pain  Right rotation: 55 degrees    with pain    Joint Play     Hypomobile: C3, C4, C5, C6, C7, T1, T2, T3, T4, T5, T6, T7 and T8     Strength/Myotome Testing     Left Shoulder      Planes of Motion   Flexion: 4+   Extension: 4+   Abduction: 4+   External rotation at 0°: 4+   Internal rotation at 0°: 4+     Isolated Muscles   Middle trapezius: 4     Right Shoulder     Planes of Motion   Flexion: 4+   Extension: 4+   Abduction: 4+   External rotation at 0°: 4+   Internal rotation at 0°: 4+     Isolated Muscles   Middle trapezius: 4     Left Elbow   Flexion: 4+  Extension: 4+    Right Elbow   Flexion: 4+  Extension: 4+    Tests   Cervical   Positive cervical distraction test.  Negative vertical compression and alar ligament test.     Left   Negative Spurling's Test A and Spurling's Test B.     Right   Negative Spurling's Test A and Spurling's Test B.     Lumbar   Negative vertical compression.       Flowsheet Rows      Flowsheet Row Most Recent Value   PT/OT G-Codes    Current Score 68   Projected Score 77               Precautions: PMH MI, HTN, DM,  L RC repair  POC Expiration:      Manuals 11/13              Cervical stretching/ suboccipital release                Thoracic PA mobs                                               Neuro Re-Ed               Prone ITY               Prone rows               Prone chin tucks                Chin tucks c OP               Scap pinch               Open books               Thoracic ext                Ther Ex               UBE for ROM               Chin tucks               UT self stretch               Doorway pec stretch               BL ER/ABD                                               HEP update/reviewed 10'             Ther Activity                                               Gait Training                                               Modalities               MHP

## 2024-11-19 NOTE — PROGRESS NOTES
"Daily Note     Today's date: 2024  Patient name: Dawson Jane  : 1955  MRN: [id number]  Referring provider: Raisa Michelle, *  Dx:   Encounter Diagnosis     ICD-10-CM    1. Posterior neck pain  M54.2           Start Time: 0800  Stop Time: 09  Total time in clinic (min): 65 minutes    Subjective: Patient reports he is feeling about the same as usual but is doing ok with his HEP.      Objective: See treatment diary below      Assessment: Tolerated treatment well. Patient displayed most significant limitations with cervical rotation and side bending but seemed to have some improvements with manual therapy techniques. Patient demonstrated fatigue post treatment, exhibited good technique with therapeutic exercises, and would benefit from continued PT to improve cervical mobility and postural awareness to reduce neck pain.      Plan: Continue per plan of care.      Precautions: PMH MI, HTN, DM,  L RC repair  POC Expiration:      Manuals             Cervical stretching/ suboccipital release    10'            Thoracic PA mobs    5'                                           Neuro Re-Ed               Prone ITY    10x ea            Prone rows               Prone chin tucks                Chin tucks c OP               Scap pinch               Open books    10x ea bilat           Thoracic ext     10x 5\" hold           Ther Ex               UBE for ROM    10' 1.0 Alt           Supine Chin tucks    10x5\" hold           UT self stretch    5x15\" hold ea bilat           3 way Doorway pec stretch    3x15\" hold ea            BL ER/ABD    10x ea RTB                                           HEP update/reviewed 10'             Ther Activity                                               Gait Training                                               Modalities               MHP    10' to neck                                 "

## 2024-11-20 ENCOUNTER — OFFICE VISIT (OUTPATIENT)
Dept: PHYSICAL THERAPY | Facility: CLINIC | Age: 69
End: 2024-11-20
Payer: COMMERCIAL

## 2024-11-20 DIAGNOSIS — M54.2 POSTERIOR NECK PAIN: Primary | ICD-10-CM

## 2024-11-20 PROCEDURE — 97110 THERAPEUTIC EXERCISES: CPT

## 2024-11-20 PROCEDURE — 97112 NEUROMUSCULAR REEDUCATION: CPT

## 2024-11-20 PROCEDURE — 97140 MANUAL THERAPY 1/> REGIONS: CPT

## 2024-11-21 NOTE — PROGRESS NOTES
"Daily Note     Today's date: 2024  Patient name: Dawson Jane  : 1955  MRN: [id number]  Referring provider: Raisa Michelle, *  Dx:   Encounter Diagnosis     ICD-10-CM    1. Posterior neck pain  M54.2           Start Time: 0805  Stop Time: 0915  Total time in clinic (min): 70 minutes    Subjective: Patient reports he feels ok today but felt better after his previous therapy session.      Objective: See treatment diary below      Assessment: Tolerated treatment well. Patient had no adverse effects to treatment today and displayed improved cervical and thoracic mobility post manual therapy. Patient demonstrated fatigue post treatment, exhibited good technique with therapeutic exercises, and would benefit from continued PT to improve cervical mobility and postural awareness to reduce pain with functional activities.       Plan: Continue per plan of care.      Precautions: PMH MI, HTN, DM,  L RC repair  POC Expiration:      Manuals           Cervical stretching/ suboccipital release    10'  10'          Thoracic PA mobs    5'  5'                                         Neuro Re-Ed               Prone ITY    10x ea   10x YT only         Prone rows               Prone chin tucks                Chin tucks c OP               Scap pinch               Open books    10x ea bilat  10x ea bilat         Thoracic ext     10x 5\" hold           Ther Ex               UBE for ROM    10' 1.0 Alt  10' 1.0 Alt         Supine Chin tucks    10x5\" hold  10x5\" hold         UT self stretch    5x15\" hold ea bilat  5x15\" hold ea bilat         3 way Doorway pec stretch    3x15\" hold ea   3x15\" hold ea          BL ER/ABD    10x ea RTB  2x10 ea RTB                                         HEP update/reviewed 10'             Ther Activity                                               Gait Training                                               Modalities               MHP    10' to neck  10' to neck            "

## 2024-11-22 ENCOUNTER — OFFICE VISIT (OUTPATIENT)
Dept: PHYSICAL THERAPY | Facility: CLINIC | Age: 69
End: 2024-11-22
Payer: COMMERCIAL

## 2024-11-22 DIAGNOSIS — M54.2 POSTERIOR NECK PAIN: Primary | ICD-10-CM

## 2024-11-22 PROCEDURE — 97110 THERAPEUTIC EXERCISES: CPT

## 2024-11-22 PROCEDURE — 97112 NEUROMUSCULAR REEDUCATION: CPT

## 2024-11-22 PROCEDURE — 97140 MANUAL THERAPY 1/> REGIONS: CPT

## 2024-11-26 ENCOUNTER — TELEMEDICINE (OUTPATIENT)
Dept: SLEEP CENTER | Facility: CLINIC | Age: 69
End: 2024-11-26
Payer: COMMERCIAL

## 2024-11-26 VITALS — HEIGHT: 67 IN | BODY MASS INDEX: 31.64 KG/M2

## 2024-11-26 DIAGNOSIS — R09.89 PULMONARY AIR TRAPPING: ICD-10-CM

## 2024-11-26 DIAGNOSIS — G47.33 MODERATE OBSTRUCTIVE SLEEP APNEA: Primary | ICD-10-CM

## 2024-11-26 PROCEDURE — 99213 OFFICE O/P EST LOW 20 MIN: CPT | Performed by: PSYCHIATRY & NEUROLOGY

## 2024-11-26 NOTE — PATIENT INSTRUCTIONS
"Thank you for trusting me with your care!    I know we often  cover a lot of information at the visit, so if you have follow-up questions, are unclear about the plan, or feel there were important items that we did not discuss or you did not receive clarity on, please don't hesitate to reach out to me.     appirishart messages are preferred for routine matters.  Please make sure to call for urgent matters as there can be a delay in responding to questions over appirishart.    IMPORTANT- Prior to a sleep study (at home or in the sleep lab), I strongly recommend contacting your medical insurance first to understand your benefits (including deductible if applicable), coverage for this test, and out of pocket costs.  Even if the test is approved by medical insurance, the cost to you is determined by your medical benefits.  You can also use [facility] on [facility]'s website, under the drop down \"Patients and Visitors\".  If you have concerns about your out of pocket costs for sleep testing, please contact me/the office before you complete the test and we can discuss if there are alternate options.     I recommend following this advice in general before any lab test, imaging test, doctor visit, surgery, or ordering CPAP supplies as it is best to understand your coverage to avoid unexpected bills after the fact.       Nursing Support:  When: Monday through Friday 7:30A-4:30PM except holidays  Where: Our direct line is [phone]  *3  *1.      If you are having a true emergency please call 911.  In the event that the line is busy or it is after hours please leave a voice message and we will return your call.  Please speak clearly, leaving your full name, birth date, best number to reach you and the reason for your call.   Medication refills: We will need the name of the medication, the dosage, the ordering provider, whether you get a 30 or 90 day refill, and the pharmacy name and address.  Medications will be ordered by the " provider only.  Nurses cannot call in prescriptions.  Please allow 7 days for medication refills.  Physician requested updates: If your provider requested that you call with an update after starting medication, please be ready to provide us the medication and dosage, what time you take your medication, the time you attempt to fall asleep, time you fall asleep, when you wake up, and what time you get out of bed.  Sleep Study Results: We will contact you with sleep study results and/or next steps after the physician has reviewed your testing.

## 2024-11-26 NOTE — ASSESSMENT & PLAN NOTE
"-Consistently using CPAP, treatment is beneficial and effective  -As he describes that the air pressure feels high at time, I reviewed the data in detail and will make the following adjustments: Change pressure range to 6/17 cm H2O, turn the EPR on at 2 with a soft adjustment.  I made this change myself on the [employer] website  -Encouraged the patient to reach out to me if he notices that his events per hour are increasing, especially if they are registering above 5/h  -He describes some \"sinus congestion/nasal congestion.  Recommend use of fluticasone which he has had success with before.  Orders:    PAP DME Pressure Change    "

## 2024-11-26 NOTE — PROGRESS NOTES
"Virtual Regular Visit  Name: Dawson Jane      : 1955      MRN: [id number]  Encounter Provider: Juan Chahal MD  Encounter Date: 2024   Encounter department: [facility] SLEEP MEDICINE [place]      Verification of patient location:  Patient is located at Home in the following state in which I hold an active license [place] :  Assessment & Plan  Moderate obstructive sleep apnea  -Consistently using CPAP, treatment is beneficial and effective  -As he describes that the air pressure feels high at time, I reviewed the data in detail and will make the following adjustments: Change pressure range to 6/17 cm H2O, turn the EPR on at 2 with a soft adjustment.  I made this change myself on the [employer] website  -Encouraged the patient to reach out to me if he notices that his events per hour are increasing, especially if they are registering above 5/h  -He describes some \"sinus congestion/nasal congestion.  Recommend use of fluticasone which he has had success with before.  Orders:    PAP DME Pressure Change    Pulmonary air trapping  -Seen on pulmonary function test, the patient denies any symptoms.  -Recommend he discuss with his primary care provider if she recommends any further workup for this but based on my conversation with pulmonary medicine, they did not feel a consultation would be needed as long as he was not symptomatic.           Encounter provider Juan Chahal MD    The patient was identified by name and date of birth. Dawson Jane was informed that this is a telemedicine visit and that the visit is being conducted through the Epic Embedded platform. He agrees to proceed..  My office door was closed. No one else was in the room.  He acknowledged consent and understanding of privacy and security of the video platform. The patient has agreed to participate and understands they can discontinue the visit at any time.    Patient is aware this is a billable service.     History of Present " "Illness     HPI    This is a 69-year-old male who returns in a follow-up visit, last seen by me in September.  He has a history of moderate obstructive sleep apnea - That test showed a respiratory event index of 23.5. Of 160 respiratory events he had 79 obstructive apneas, 0 central and mixed apneas, 81 hypopneas. Oxygen saturations were less than 89% for a total of 58 minutes due to hypoxia, I ordered pulmonary function test as well as a chest x-ray.  Pulmonary function tests showed air trapping as indicated by lung volumes but normal spirometry and normal diffusion capacity.  I discussed the case with a pulmonologist and they thought the patient should see pulmonary medicine only if he was symptomatic.  Chest x-ray was negative.  He also had an overnight oximetry test which showed oxygen saturations less than or equal to 89% for 56 seconds which was considered negligible    Cc- notes some leak with his machine at high pressures     Dawson describes that he is doing well, has no major concerns except that the air pressure feels high at times.  This will sometimes wake him up at night.  He typically falls asleep easily, awakens during the night but falls back asleep without much difficulty.  He is not typically sleepy during the day.      PAP Data  [employer]irsense 11 auto   Average 7 hr 49 min a night  Use 83/83 days  AHI 2.8  Median pressure- 11.2, 95% pressure- 15.4 cm h20  Leak- acceptable  Uses a FFM , airtouch f20     In bed 12 AM, asleep in 15 min.  Wakes 3-4 times, returns to sleep quickly. Up at 7 AM    Naps 1-2 days a week, can function without napping      Review of Systems  Denies dyspnea    Objective   Ht 5' 7\" (1.702 m)   BMI 31.64 kg/m²     Physical Exam    Visit Time  Total Visit Duration: 23  "

## 2024-11-27 ENCOUNTER — OFFICE VISIT (OUTPATIENT)
Dept: PHYSICAL THERAPY | Facility: CLINIC | Age: 69
End: 2024-11-27
Payer: COMMERCIAL

## 2024-11-27 DIAGNOSIS — M54.2 POSTERIOR NECK PAIN: Primary | ICD-10-CM

## 2024-11-27 PROCEDURE — 97110 THERAPEUTIC EXERCISES: CPT

## 2024-11-27 PROCEDURE — 97112 NEUROMUSCULAR REEDUCATION: CPT

## 2024-11-27 PROCEDURE — 97140 MANUAL THERAPY 1/> REGIONS: CPT

## 2024-11-27 NOTE — PROGRESS NOTES
"Daily Note     Today's date: 2024  Patient name: Dawson Jane  : 1955  MRN: [id number]  Referring provider: Raisa Michelle, *  Dx:   Encounter Diagnosis     ICD-10-CM    1. Posterior neck pain  M54.2           Start Time: 0805  Stop Time: 0910  Total time in clinic (min): 65 minutes    Subjective: Patient reports he is doing ok and gets some improvement in neck pain after therapy sessions. He does feel some soreness in his B shoulders today.       Objective: See treatment diary below      Assessment: Tolerated treatment well. Patient seemed to have improved thoracic mobility with PA mobs however continues to display moderate to severe kyphotic curvature, leading to postural deformities and forward head posture. Patient demonstrated fatigue post treatment, exhibited good technique with therapeutic exercises, and would benefit from continued PT to improve cervical mobility and postural awareness to reduce pain with functional mobility.      Plan: Continue per plan of care.      Precautions: PMH MI, HTN, DM,  L RC repair  POC Expiration:      Manuals         Cervical stretching/ suboccipital release    10'  10'  15' c UT STM        Thoracic PA mobs    5'  5'  5'                                       Neuro Re-Ed               Prone ITY    10x ea   10x YT only  10x YT only        Prone rows               Prone chin tucks                Chin tucks c OP        10x c RFB at wall       Scap pinch        10x5\" hold       Open books    10x ea bilat  10x ea bilat  10x ea bilat       Thoracic ext     10x 5\" hold           Ther Ex               UBE for ROM    10' 1.0 Alt  10' 1.0 Alt  10' 1.0 Alt       Supine Chin tucks    10x5\" hold  10x5\" hold  10x5\" hold       UT self stretch    5x15\" hold ea bilat  5x15\" hold ea bilat  5x15\" hold ea bilat       3 way Doorway pec stretch    3x15\" hold ea   3x15\" hold ea   3x15\" hold ea        BL ER/ABD    10x ea RTB  2x10 ea RTB  2x10 ea RTB     "                                   HEP update/reviewed 10'             Ther Activity                                               Gait Training                                               Modalities               MHP    10' to neck  10' to neck   10' to neck

## 2024-11-29 ENCOUNTER — TELEPHONE (OUTPATIENT)
Dept: SLEEP CENTER | Facility: CLINIC | Age: 69
End: 2024-11-29

## 2024-11-29 ENCOUNTER — APPOINTMENT (OUTPATIENT)
Dept: PHYSICAL THERAPY | Facility: CLINIC | Age: 69
End: 2024-11-29
Payer: COMMERCIAL

## 2024-12-04 ENCOUNTER — OFFICE VISIT (OUTPATIENT)
Dept: PHYSICAL THERAPY | Facility: CLINIC | Age: 69
End: 2024-12-04
Payer: COMMERCIAL

## 2024-12-04 DIAGNOSIS — M54.2 POSTERIOR NECK PAIN: Primary | ICD-10-CM

## 2024-12-04 PROCEDURE — 97110 THERAPEUTIC EXERCISES: CPT

## 2024-12-04 PROCEDURE — 97112 NEUROMUSCULAR REEDUCATION: CPT

## 2024-12-04 PROCEDURE — 97140 MANUAL THERAPY 1/> REGIONS: CPT

## 2024-12-04 NOTE — PROGRESS NOTES
"Daily Note     Today's date: 2024  Patient name: Dawson Jane  : 1955  MRN: [id number]  Referring provider: Raisa Michelle, *  Dx:   Encounter Diagnosis     ICD-10-CM    1. Posterior neck pain  M54.2                      Subjective: Patient reports that he is doing okay this morning.  \"I was sick over the weekend, so I'm a little sore yet from that.\"      Objective: See treatment diary below      Assessment: Tolerated treatment well. Patient exhibited good technique with therapeutic exercises and would benefit from continued PT to increase cervical ROM/strength and endurance to improve his mobility.      Plan: Continue per plan of care.      Precautions: PMH MI, HTN, DM,  L RC repair  POC Expiration:      Manuals       Cervical stretching/ suboccipital release    10'  10'  15' c UT STM  25' c STM to UT, LS      Thoracic PA mobs    5'  5'  5'                                       Neuro Re-Ed               Prone ITY    10x ea   10x YT only  10x YT only   10xea YT      Prone rows               Prone chin tucks                Chin tucks c OP        10x c RFB at wall  NT     Scap pinch        10x5\" hold  NT     Open books    10x ea bilat  10x ea bilat  10x ea bilat  10x5\"bilat     Thoracic ext     10x 5\" hold           Ther Ex               UBE for ROM    10' 1.0 Alt  10' 1.0 Alt  10' 1.0 Alt  10' 1.0alt     Supine Chin tucks    10x5\" hold  10x5\" hold  10x5\" hold  10x5\"     UT self stretch    5x15\" hold ea bilat  5x15\" hold ea bilat  5x15\" hold ea bilat  5x15\"bilat     3 way Doorway pec stretch    3x15\" hold ea   3x15\" hold ea   3x15\" hold ea   3x15\"ea     BL ER/ABD    10x ea RTB  2x10 ea RTB  2x10 ea RTB  2x10ea RTB                                     HEP update/reviewed 10'             Ther Activity                                               Gait Training                                               Modalities               MHP    10' to neck "  10' to neck   10' to neck   15' to   bilat UB

## 2024-12-06 ENCOUNTER — OFFICE VISIT (OUTPATIENT)
Dept: PHYSICAL THERAPY | Facility: CLINIC | Age: 69
End: 2024-12-06
Payer: COMMERCIAL

## 2024-12-06 DIAGNOSIS — M54.2 POSTERIOR NECK PAIN: Primary | ICD-10-CM

## 2024-12-06 PROCEDURE — 97140 MANUAL THERAPY 1/> REGIONS: CPT

## 2024-12-06 PROCEDURE — 97112 NEUROMUSCULAR REEDUCATION: CPT

## 2024-12-06 PROCEDURE — 97110 THERAPEUTIC EXERCISES: CPT

## 2024-12-06 NOTE — PROGRESS NOTES
"Daily Note     Today's date: 2024  Patient name: Dawson Jane  : 1955  MRN: [id number]  Referring provider: Raisa Michelle, *  Dx:   Encounter Diagnosis     ICD-10-CM    1. Posterior neck pain  M54.2                      Subjective: Patient reports that he is doing well this morning besides some minor soreness in the R anterior shoulder.      Objective: See treatment diary below      Assessment: Tolerated treatment well. Patient exhibited good technique with therapeutic exercises and would benefit from continued PT to increase cervical ROM/strength and endurance to improve his mobility.      Plan: Continue per plan of care.      Precautions: PMH MI, HTN, DM,  L RC repair  POC Expiration:      Manuals     Cervical stretching/ suboccipital release    10'  10'  15' c UT STM  25' c STM to UT, LS  10'    Thoracic PA mobs    5'  5'  5'    5'                                   Neuro Re-Ed             Prone ITY    10x ea   10x YT only  10x YT only   10xea YT   YT 10xea   Prone rows               Prone chin tucks                Chin tucks c OP        10x c RFB at wall  NT     Scap pinch        10x5\" hold  NT  10x5\"   Open books    10x ea bilat  10x ea bilat  10x ea bilat  10x5\"bilat  10x5\"bilat   Thoracic ext     10x 5\" hold           Ther Ex             UBE for ROM    10' 1.0 Alt  10' 1.0 Alt  10' 1.0 Alt  10' 1.0alt  10' 1.0alt   Supine Chin tucks    10x5\" hold  10x5\" hold  10x5\" hold  10x5\"  10x5\"   UT self stretch    5x15\" hold ea bilat  5x15\" hold ea bilat  5x15\" hold ea bilat  5x15\"bilat  NT   3 way Doorway pec stretch    3x15\" hold ea   3x15\" hold ea   3x15\" hold ea   3x15\"ea  3x15\"ea   BL ER/ABD    10x ea RTB  2x10 ea RTB  2x10 ea RTB  2x10ea RTB  2x10ea RTB                                   HEP update/reviewed 10'             Ther Activity                                             Gait Training                        "                      Modalities          12/4 12/6   MHP    10' to neck  10' to neck   10' to neck   15' to   bilat UB  15' to   bilat UB

## 2024-12-11 ENCOUNTER — EVALUATION (OUTPATIENT)
Dept: PHYSICAL THERAPY | Facility: CLINIC | Age: 69
End: 2024-12-11
Payer: COMMERCIAL

## 2024-12-11 DIAGNOSIS — M54.2 POSTERIOR NECK PAIN: Primary | ICD-10-CM

## 2024-12-11 PROCEDURE — 97140 MANUAL THERAPY 1/> REGIONS: CPT

## 2024-12-11 PROCEDURE — 97110 THERAPEUTIC EXERCISES: CPT

## 2024-12-11 PROCEDURE — 97112 NEUROMUSCULAR REEDUCATION: CPT

## 2024-12-11 NOTE — PROGRESS NOTES
"Daily Note     Today's date: 2024  Patient name: Dawson Jane  : 1955  MRN: [id number]  Referring provider: Raisa Michelle, *  Dx:   Encounter Diagnosis     ICD-10-CM    1. Posterior neck pain  M54.2                      Subjective: Patient reports that he does feel improvement in his cervical region since starting OP PT.  \"My R shoulder is sore from axe throwing this past weekend.\"      Objective: See treatment diary below      Assessment: Tolerated treatment well. Patient exhibited good technique with therapeutic exercises and would benefit from continued PT to increase cervical ROM/strength and endurance to improve his mobility.      Plan: Continue per plan of care.      Precautions: PMH MI, HTN, DM,  L RC repair  POC Expiration:      Manuals      Cervical stretching/ suboccipital release  10'  15' c UT STM  25' c STM to UT, LS  10' 10'    Thoracic PA mobs  5'  5'    5' 5'                             Neuro Re-Ed         Prone ITY  10x YT only  10x YT only   10xea YT   YT 10xea YT 10xea   Prone rows            Prone chin tucks             Chin tucks c OP    10x c RFB at wall  NT      Scap pinch    10x5\" hold  NT  10x5\" NT   Open books  10x ea bilat  10x ea bilat  10x5\"bilat  10x5\"bilat 10x5\"bilat   Thoracic ext             Ther Ex         UBE for ROM  10' 1.0 Alt  10' 1.0 Alt  10' 1.0alt  10' 1.0alt NT   Supine Chin tucks  10x5\" hold  10x5\" hold  10x5\"  10x5\" 10x5\"   UT self stretch  5x15\" hold ea bilat  5x15\" hold ea bilat  5x15\"bilat  NT    3 way Doorway pec stretch  3x15\" hold ea   3x15\" hold ea   3x15\"ea  3x15\"ea 3x15\"ea   BL ER/ABD  2x10 ea RTB  2x10 ea RTB  2x10ea RTB  2x10ea RTB 2x10ea RTB             SA Stretch c FR at Wall 10x5\"                HEP update/reviewed            Ther Activity                                   Gait Training                                   Modalities        " 12/6 12/11   MHP  10' to neck   10' to neck   15' to   bilat UB  15' to   bilat UB 15' to   bilat UB

## 2024-12-11 NOTE — PROGRESS NOTES
PT Re-Evaluation     Today's date: 2024  Patient name: Dawson Jane  : 1955  MRN: [id number]  Referring provider: Raisa Michelle, *  Dx:   Encounter Diagnosis     ICD-10-CM    1. Posterior neck pain  M54.2           Start Time: 0800  Stop Time: 905  Total time in clinic (min): 65 minutes    Assessment  Impairments: abnormal or restricted ROM, impaired physical strength, lacks appropriate home exercise program, pain with function, participation limitations and activity limitations    Assessment details: Patient has been seen by OP PT for ~1 month and is making fair progress towards his goals. Patient is demonstrating some improvements in cervical ROM but is now able to move to end range without pain increase. He is also demonstrating improved tolerance with sleeping and functional tasks at home compared to when he stated with PT. Patient would benefit from continued skilled PT to further improve postural awareness and cervical mobility to reduce pain with functional tasks.     Goals  STG to be met within 4 weeks:  1. Pt will report 3/10 neck pain at worst to improve QOL. - met  2. Pt will increase B shoulder strength by 1/2 muscle grade to increase functional mobility. - met  3. Pt will be independent with HEP to improve cervical motion and reduce pain. - met  4. Pt will improve FOTO score by 10 points in order to improve functional mobility and quality of life. - met     LTG to be met within 8 weeks:  1. Pt will restore cervical AROM to WNL to improve activity tolerance and reduce pain with mobility. - in progress  2. Pt will restore B shoulder strength to WNL to improve functional mobility and return to normal work duties. - met  3. Pt will meet FOTO score goal by DC to improve functional mobility and return to PLOF. - in progress  4. Pt will be I in HEP for DC. - in progress         Plan  Patient would benefit from: skilled physical therapy  Planned modality interventions: cryotherapy and  thermotherapy: hydrocollator packs    Planned therapy interventions: flexibility, home exercise program, functional ROM exercises, joint mobilization, manual therapy, neuromuscular re-education, patient/caregiver education, self care, postural training, strengthening, stretching and therapeutic exercise    Frequency: 2x week  Duration in weeks: 4  Treatment plan discussed with: patient        Subjective Evaluation    History of Present Illness  Mechanism of injury: Patient reports he feels he has been making about 50% progress with therapy and is definitely seeing improvement with his sleep quality. He currently has very minimal pain in his neck and feels that he has better movement, however, is very sore in his R shoulder from axe throwing over the weekend.   Patient Goals  Patient goals for therapy: decreased pain and increased strength    Pain  Current pain ratin  At best pain ratin  At worst pain ratin  Quality: dull ache and sharp  Relieving factors: medications  Progression: worsening    Treatments  Current treatment: physical therapy        Objective     Postural Observations  Seated posture: poor  Standing posture: poor      Palpation   Left   Tenderness of the cervical paraspinals, suboccipitals and upper trapezius.     Right   Tenderness of the cervical paraspinals, suboccipitals and upper trapezius.     Active Range of Motion   Cervical/Thoracic Spine       Cervical    Flexion:  WFL  Extension: 35 degrees      Left lateral flexion: 35 degrees      Right lateral flexion: 35 degrees      Left rotation: 55 degrees  Right rotation: 65 degrees       Joint Play     Hypomobile: C3, C4, C5, C6, C7, T1, T2, T3, T4, T5, T6, T7 and T8     Strength/Myotome Testing     Left Shoulder     Planes of Motion   Flexion: 5   Extension: 5   Abduction: 5   External rotation at 0°: 5   Internal rotation at 0°: 5     Isolated Muscles   Middle trapezius: 4+     Right Shoulder     Planes of Motion   Flexion: 5    Extension: 5   Abduction: 5   External rotation at 0°: 5   Internal rotation at 0°: 5     Isolated Muscles   Middle trapezius: 4+     Left Elbow   Flexion: 5  Extension: 5    Right Elbow   Flexion: 5  Extension: 5    Tests   Cervical   Positive cervical distraction test.  Negative vertical compression and alar ligament test.     Left   Negative Spurling's Test A and Spurling's Test B.     Right   Negative Spurling's Test A and Spurling's Test B.     Lumbar   Negative vertical compression.                Precautions: PMH MI, HTN, DM,  L RC repair  POC Expiration:1/8/25

## 2024-12-12 NOTE — PROGRESS NOTES
"Daily Note     Today's date: 2024  Patient name: Dawson Jane  : 1955  MRN: [id number]  Referring provider: Raisa Michelle, *  Dx:   Encounter Diagnosis     ICD-10-CM    1. Posterior neck pain  M54.2                      Subjective:  Patient reports continuation of neck and UB tightness to 3/10 level.  Patient reports positioning at night continues to be difficult for the patient with increase pain levels and tightness in the shoulder and UB.        Objective: See treatment diary below      Assessment: Tolerated treatment well.  PT notes continuation of decrease ROM and strength t/o the cervical spine with demonstration of impaired UB posture with need for continuation of skilled therapy.        Plan: Continue per plan of care.      Precautions: PMH MI, HTN, DM,  L RC repair  POC Expiration:      Manuals      Cervical stretching/ suboccipital release  15' c UT STM  25' c STM to UT, LS  10' 10' 10 min     Thoracic PA mobs  5'    5' 5' 5 min                            Neuro Re-Ed        Prone ITY  10x YT only   10xea YT   YT 10xea YT 10xea YT 10x Each    Prone rows        10x 3# Bilat    Prone chin tucks            Chin tucks c OP  10x c RFB at wall  NT       Scap pinch  10x5\" hold  NT  10x5\" NT 10x5\" Hold    Open books  10x ea bilat  10x5\"bilat  10x5\"bilat 10x5\"bilat 10x5\" Hold Bilat    Thoracic ext            Ther Ex        UBE for ROM  10' 1.0 Alt  10' 1.0alt  10' 1.0alt NT 10 min L1.3    Supine Chin tucks  10x5\" hold  10x5\"  10x5\" 10x5\" 10x5\" Hold    UT self stretch  5x15\" hold ea bilat  5x15\"bilat  NT     3 way Doorway pec stretch  3x15\" hold ea   3x15\"ea  3x15\"ea 3x15\"ea 3x15\" Hold Each    BL ER/ABD  2x10 ea RTB  2x10ea RTB  2x10ea RTB 2x10ea RTB 2x10 Each Red            SA Stretch c FR at Wall 10x5\" 10x5\" Hold                HEP update/reviewed           Ther Activity                                Gait Training  "   12/4 12/6 12/11                            Modalities    12/4 12/6 12/11    MHP  10' to neck   15' to   bilat UB  15' to   bilat UB 15' to   bilat UB 15 min   Bilat UB

## 2024-12-13 ENCOUNTER — OFFICE VISIT (OUTPATIENT)
Dept: PHYSICAL THERAPY | Facility: CLINIC | Age: 69
End: 2024-12-13
Payer: COMMERCIAL

## 2024-12-13 DIAGNOSIS — M54.2 POSTERIOR NECK PAIN: Primary | ICD-10-CM

## 2024-12-13 PROCEDURE — 97110 THERAPEUTIC EXERCISES: CPT | Performed by: PHYSICAL THERAPIST

## 2024-12-13 PROCEDURE — 97112 NEUROMUSCULAR REEDUCATION: CPT | Performed by: PHYSICAL THERAPIST

## 2024-12-13 PROCEDURE — 97140 MANUAL THERAPY 1/> REGIONS: CPT | Performed by: PHYSICAL THERAPIST

## 2024-12-18 ENCOUNTER — OFFICE VISIT (OUTPATIENT)
Dept: PHYSICAL THERAPY | Facility: CLINIC | Age: 69
End: 2024-12-18
Payer: COMMERCIAL

## 2024-12-18 DIAGNOSIS — M54.2 POSTERIOR NECK PAIN: Primary | ICD-10-CM

## 2024-12-18 PROCEDURE — 97140 MANUAL THERAPY 1/> REGIONS: CPT | Performed by: PHYSICAL THERAPIST

## 2024-12-18 PROCEDURE — 97112 NEUROMUSCULAR REEDUCATION: CPT | Performed by: PHYSICAL THERAPIST

## 2024-12-18 PROCEDURE — 97110 THERAPEUTIC EXERCISES: CPT | Performed by: PHYSICAL THERAPIST

## 2024-12-18 NOTE — PROGRESS NOTES
"Daily Note     Today's date: 2024  Patient name: Dawson Jane  : 1955  MRN: [id number]  Referring provider: Raisa Michelle, *  Dx:   Encounter Diagnosis     ICD-10-CM    1. Posterior neck pain  M54.2                      Subjective:  Patient reports he was snow blowing earlier in the week leading to increase stiffness in the neck with soreness in the right shoulder.  Post session, the patient reports feeling looser in the shoulder and UB.        Objective: See treatment diary below      Assessment: Tolerated treatment well.  PT notes continuation of decrease ROM and strength t/o the cervical spine with UB with demonstration of impaired UB posture with need for continuation of skilled therapy.  PT notes addition of right shoulder/GH mobs and stretching to POC to address patient subjective comments.       Plan: Continue per plan of care.      Precautions: PMH MI, HTN, DM,  L RC repair  POC Expiration:      Manuals      Cervical stretching/ suboccipital release  25' c STM to UT, LS  10' 10' 10 min  10 min with Right Shoulder Mobs and stretching     Thoracic PA mobs    5' 5' 5 min  5 min                          Neuro Re-Ed       Prone ITY  10xea YT   YT 10xea YT 10xea YT 10x Each  NT    Prone rows      10x 3# Bilat  NT   Prone chin tucks           Chin tucks c OP  NT        Scap pinch  NT  10x5\" NT 10x5\" Hold  10x5\" Hold    Open books  10x5\"bilat  10x5\"bilat 10x5\"bilat 10x5\" Hold Bilat  10x5\" Hold Bilat    Thoracic ext           Ther Ex       UBE for ROM  10' 1.0alt  10' 1.0alt NT 10 min L1.3  10 min L1.3 Alt    Supine Chin tucks  10x5\"  10x5\" 10x5\" 10x5\" Hold  10x5\" Hold    UT self stretch  5x15\"bilat  NT      3 way Doorway pec stretch  3x15\"ea  3x15\"ea 3x15\"ea 3x15\" Hold Each  3x15\" Hold Each    BL ER/ABD  2x10ea RTB  2x10ea RTB 2x10ea RTB 2x10 Each Red  2x10 Each          SA Stretch c FR at Wall 10x5\" 10x5\" Hold  10x5\" Hold            "    HEP update/reviewed          Ther Activity  12/4 12/6 12/11                           Gait Training  12/4 12/6 12/11                           Modalities  12/4 12/6 12/11     MHP  15' to   bilat UB  15' to   bilat UB 15' to   bilat UB 15 min   Bilat UB  15 min Bilat UB

## 2024-12-19 NOTE — PROGRESS NOTES
"Daily Note     Today's date: 2024  Patient name: Dawson Jane  : 1955  MRN: [id number]  Referring provider: Raisa Michelle, *  Dx:   Encounter Diagnosis     ICD-10-CM    1. Posterior neck pain  M54.2           Start Time: 0800  Stop Time: 0910  Total time in clinic (min): 70 minutes    Subjective: Patient reports his R shoulder is still bothering him but it is better than it was in the previous therapy session.       Objective: See treatment diary below      Assessment: Tolerated treatment well. PT recommends patient begin taking anti inflammatory to reduce R shoulder pain and immobility as it seems he has developed rotator cuff tendonitis. Patient demonstrated fatigue post treatment, exhibited good technique with therapeutic exercises, and would benefit from continued PT to improve cervical mobility and postural awareness to reduce pain with functional activities.      Plan: Continue per plan of care.      Precautions: PMH MI, HTN, DM,  L RC repair  POC Expiration:      Manuals      Cervical stretching/ suboccipital release  10' 10' 10 min  10 min with Right Shoulder Mobs and stretching  20' with R shoulder mobs/ stretching    Thoracic PA mobs  5' 5' 5 min  5 min  NT                       Neuro Re-Ed        Prone ITY  YT 10xea YT 10xea YT 10x Each  NT     Prone rows    10x 3# Bilat  NT    Prone chin tucks       Wall slides IYT 10x ea    Chin tucks against wall       C RFB 10x5\" hold    Scap pinch  10x5\" NT 10x5\" Hold  10x5\" Hold  10x5\" hold    Open books  10x5\"bilat 10x5\"bilat 10x5\" Hold Bilat  10x5\" Hold Bilat  NT   Thoracic ext          Ther Ex        UBE for ROM  10' 1.0alt NT 10 min L1.3  10 min L1.3 Alt  10' 2.0 Alt   Supine Chin tucks  10x5\" 10x5\" 10x5\" Hold  10x5\" Hold  10x 5\" hold    UT self stretch  NT       3 way Doorway pec stretch  3x15\"ea 3x15\"ea 3x15\" Hold Each  3x15\" Hold Each  3x15\" hold ea    BL ER/ABD  2x10ea RTB 2x10ea RTB " "2x10 Each Red  2x10 Each  NT       SA Stretch c FR at Wall 10x5\" 10x5\" Hold  10x5\" Hold  10x5\" hold              HEP update/reviewed         Ther Activity  12/6 12/11                          Gait Training  12/6 12/11                          Modalities  12/6 12/11      MHP  15' to   bilat UB 15' to   bilat UB 15 min   Bilat UB  15 min Bilat UB  10' cervical and bilat UB                             "

## 2024-12-20 ENCOUNTER — OFFICE VISIT (OUTPATIENT)
Dept: PHYSICAL THERAPY | Facility: CLINIC | Age: 69
End: 2024-12-20
Payer: COMMERCIAL

## 2024-12-20 DIAGNOSIS — M54.2 POSTERIOR NECK PAIN: Primary | ICD-10-CM

## 2024-12-20 PROCEDURE — 97140 MANUAL THERAPY 1/> REGIONS: CPT

## 2024-12-20 PROCEDURE — 97112 NEUROMUSCULAR REEDUCATION: CPT

## 2024-12-20 PROCEDURE — 97110 THERAPEUTIC EXERCISES: CPT

## 2024-12-23 ENCOUNTER — OFFICE VISIT (OUTPATIENT)
Dept: PHYSICAL THERAPY | Facility: CLINIC | Age: 69
End: 2024-12-23
Payer: COMMERCIAL

## 2024-12-23 DIAGNOSIS — M54.2 POSTERIOR NECK PAIN: Primary | ICD-10-CM

## 2024-12-23 PROCEDURE — 97110 THERAPEUTIC EXERCISES: CPT

## 2024-12-23 PROCEDURE — 97140 MANUAL THERAPY 1/> REGIONS: CPT

## 2024-12-23 NOTE — PROGRESS NOTES
"Daily Note     Today's date: 2024  Patient name: Dawson Jane  : 1955  MRN: [id number]  Referring provider: Raisa Michelle, *  Dx:   Encounter Diagnosis     ICD-10-CM    1. Posterior neck pain  M54.2           Start Time: 0800  Stop Time: 0855  Total time in clinic (min): 55 minutes    Subjective: Patient reports his R shoulder is still giving him some trouble, especially when pushing up to get out of a chair or bed.      Objective: See treatment diary below      Assessment: Tolerated treatment fairly well. POC was adjusted to address patients current concerns of R shoulder pain. He was given HEP for shoulder abduction isometric for pain control with patient reports of improvements in symptoms post exercise. Patient demonstrated fatigue post treatment, exhibited good technique with therapeutic exercises, and would benefit from continued PT to improve cervical mobility and postural awareness to reduce pain with functional tasks.       Plan: Continue per plan of care.      Precautions: PMH MI, HTN, DM,  L RC repair  POC Expiration:      Manuals     Cervical stretching/ suboccipital release 10' 10 min  10 min with Right Shoulder Mobs and stretching  20' with R shoulder mobs/ stretching 15' with R shoulder mobs/ stretching    Thoracic PA mobs 5' 5 min  5 min  NT                      Neuro Re-Ed        Prone ITY YT 10xea YT 10x Each  NT      Prone rows  10x 3# Bilat  NT     Prone chin tucks     Wall slides IYT 10x ea  NT   Chin tucks against wall     C RFB 10x5\" hold  NT   Scap pinch NT 10x5\" Hold  10x5\" Hold  10x5\" hold  10x5\" hold   Open books 10x5\"bilat 10x5\" Hold Bilat  10x5\" Hold Bilat  NT    Thoracic ext         Ther Ex        UBE for ROM NT 10 min L1.3  10 min L1.3 Alt  10' 2.0 Alt 10' 1.5 Alt    Supine Chin tucks 10x5\" 10x5\" Hold  10x5\" Hold  10x 5\" hold  10x5\" hold    UT self stretch     5x15\" hold ea bilat    3 way Doorway pec stretch 3x15\"ea 3x15\" " "Hold Each  3x15\" Hold Each  3x15\" hold ea  NT   BL ER/ABD 2x10ea RTB 2x10 Each Red  2x10 Each  NT      SA Stretch c FR at Wall 10x5\" 10x5\" Hold  10x5\" Hold  10x5\" hold  NT            HEP update/reviewed     5'   Ther Activity 12/11                         Gait Training 12/11                         Modalities 12/11       MHP 15' to   bilat UB 15 min   Bilat UB  15 min Bilat UB  10' cervical and bilat UB 15' R shoulder                              "

## 2024-12-26 ENCOUNTER — TELEPHONE (OUTPATIENT)
Dept: GASTROENTEROLOGY | Facility: CLINIC | Age: 69
End: 2024-12-26

## 2024-12-26 ENCOUNTER — PREP FOR PROCEDURE (OUTPATIENT)
Age: 69
End: 2024-12-26

## 2024-12-26 ENCOUNTER — TELEPHONE (OUTPATIENT)
Age: 69
End: 2024-12-26

## 2024-12-26 DIAGNOSIS — Z86.0100 HISTORY OF COLON POLYPS: Primary | ICD-10-CM

## 2024-12-26 NOTE — TELEPHONE ENCOUNTER
Spoke to Lisa, his wife. Explained he is due for recall colonoscopy the end of February, beginning of March 2025. We are calling to see if he wants to get scheduled and review procedure. He takes Ozempic and Jardiance. She will let him know and he will return call to schedule.

## 2024-12-26 NOTE — PROGRESS NOTES
"Daily Note     Today's date: 2024  Patient name: Dawson Jane  : 1955  MRN: [id number]  Referring provider: Raisa Michelle, *  Dx:   Encounter Diagnosis     ICD-10-CM    1. Posterior neck pain  M54.2                      Subjective:  Patient reports having a rough time over the holiday with increase pain levels in the right shoulder as well as feels his right CTS in acting up with increase N/T.  Patient reports the symptoms have calmed down slightly this morning to 3/10 level.  Post session, the patient reports overall improvement with increase ease with movement and 1/10 soreness.        Objective: See treatment diary below      Assessment: Tolerated treatment well.  PT notes continuation of decrease ROM and strength t/o the cervical spine with UB with demonstration of impaired UB posture with need for continuation of skilled therapy.       Plan: Continue per plan of care.      Precautions: PMH MI, HTN, DM,  L RC repair  POC Expiration:      Manuals     Cervical stretching/ suboccipital release 10 min with Right Shoulder Mobs and stretching  20' with R shoulder mobs/ stretching 15' with R shoulder mobs/ stretching 15 min with Right Shoulder mobs and stretching     Thoracic PA mobs 5 min  NT  CROM mobs and stretching   10 min                    Neuro Re-Ed       Prone ITY NT    S/L Scap 4 way   2x10 Each   GWB    Prone rows NT   2x10   5#    Prone chin tucks   Wall slides IYT 10x ea  NT IYT 10x Each    Chin tucks against wall   C RFB 10x5\" hold  NT    Scap pinch 10x5\" Hold  10x5\" hold  10x5\" hold    Open books 10x5\" Hold Bilat  NT     Thoracic ext     Wall Push-up+   10x    Ther Ex       UBE for ROM 10 min L1.3 Alt  10' 2.0 Alt 10' 1.5 Alt  10 min 1.5 Alt    Supine Chin tucks 10x5\" Hold  10x 5\" hold  10x5\" hold  10x5\" Hold    UT self stretch   5x15\" hold ea bilat     3 way Doorway pec stretch 3x15\" Hold Each  3x15\" hold ea  NT    BL ER/ABD 2x10 Each  NT       10x5\" Hold  " "10x5\" hold  NT            HEP update/reviewed   5'    Ther Activity                       Gait Training                       Modalities       MHP 15 min Bilat UB  10' cervical and bilat UB 15' R shoulder 15 min Right Shoulder and UB                                "

## 2024-12-26 NOTE — LETTER
Hello,    Attached are your prep instructions for your upcoming procedure. Please feel free to contact us at [phone] if you have any questions.    Thank you,    [facility] Gastroenterology, Colon & Rectal Surgery           Medicine Instructions for Adults with Diabetes who Need a Bowel Prep       Follow these instructions when a BOWEL PREP is required for your procedure or surgery!    NOTE:   GLP-1 Agonists taken weekly: do not take in the 7 days before your procedure   SGLT-2 Inhibitors: do not take in the 4 days before your procedure     On the Day Before Surgery/Procedure  If you are having a procedure (e.g. Colonoscopy) or surgery that requires a bowel prep and you may have at least a clear liquid diet, follow the directions below based on the type of medicine you take for your diabetes.     Type of Medicine You Take Examples What to do   Pre-Mixed Insulin - Intermediate Acting Humalog® 75/25, Humulin® 70/30, Novolog® 70/30, Regular Insulin Take ½ your regular dose the evening before your procedure   Rapid/Fast Acting Insulin Humalog® U200, NovoLog®, Apidra®, Fiasp® Take ½ your regular dose the evening before your procedure.   Long-Acting Insulin Lantus®, Levemir®, Tresiba®, Toujeo®, Basaglar® Take your FULL regular dose the day before procedure   Oral Sulfonylurea Glipizide/Glimepiride/Glucotrol® Take ½ your regular dose the evening before your procedure   Other Oral Diabetes Medicines Metformin®, Glucophage®, Glucophage XR®, Riomet®, Glumetza®), Actose®, Avandia®, Glyset®, Prandin® Take your regular dose with dinner in the evening before your procedure   GLP-1 Agonists AdlyxinÒ, ByettaÒ, BydureonÒ, OzempicÒ, SoliquaÒ, TanzeumÒ, TrulicityÒ, VictozaÒ, Saxenda®, Rybelsus® If taken daily, take as normal    If taken weekly, do not take this medicine for 7 days before your procedure including the day of the procedure (resume taking after the procedure)   SGLT-2 Inhibitors Jardiance®, Invokana®, Farxiga®,    Steglatro®, Brenzavvy®, Qtern®, Segluromet®, Glyxambi®, Synjardy®, Synjardy XR®, Invokamet®, Invokamet XR®, Trijary XR®, Xigduo XR®, Steglujan® Do not take for 4 days before your procedure including the day of the procedure (resume taking after the procedure)                More information continued on back                    Medicine Instructions for Adults with Diabetes who Need a Bowel Prep  Page 2      On the Day of Surgery/Procedure  Follow the directions below based on the type of medicine you take for your diabetes.     Type of Medicine You Take Examples What to do   Long-Acting Insulin Lantus®, Levemir®, Tresiba®, Toujeo®, Basaglar®, Semglee®   If you usually take your Long-Acting Insulin in the morning, take the full dose as scheduled.   GLP-1 Agonists AdlyxinÒ, ByettaÒ, BydureonÒ, OzempicÒ, SoliquaÒ, TanzeumÒ, TrulicityÒ, VictozaÒ, Saxenda®, Rybelsus® Do NOT take this medicine on the day of your procedure (resume taking after the procedure)       On the Day of Surgery/Procedure (continued)  Except for the morning Long-Acting Insulin, DO NOT take ANY diabetic medicine on the day of your procedure unless you were instructed by the doctor who manages your diabetes medicines.    Continue to check your blood sugars.  If you have an insulin pump, ask your endocrinologist for instructions at least 3 days before your procedure. NOTE: If you are not able to ask your endocrinologist in advance, on the day of the procedure set your insulin pump to your basal rate only. Bring your insulin pump supplies to the hospital.     If you have any questions about taking your diabetes medicines prior to your procedure, please contact the doctor who manages your diabetes medicines.

## 2024-12-26 NOTE — TELEPHONE ENCOUNTER
12/26/24  Screened by: Steff Varner    Referring Provider     Pre- Screening:     There is no height or weight on file to calculate BMI.31.64  Has patient been referred for a routine screening Colonoscopy? yes  Is the patient between 45-75 years old? yes      Previous Colonoscopy yes   If yes:    Date: 2023    Facility:     Reason:         Does the patient want to see a Gastroenterologist prior to their procedure OR are they having any GI symptoms? no    Has the patient been hospitalized or had abdominal surgery in the past 6 months? no    Does the patient use supplemental oxygen? no    Does the patient take Coumadin, Lovenox, Plavix, Elliquis, Xarelto, or other blood thinning medication? no    Has the patient had a stroke, cardiac event, or stent placed in the past year? no      If patient is between 45yrs - 49yrs, please advise patient that we will have to confirm benefits & coverage with their insurance company for a routine screening colonoscopy.

## 2024-12-26 NOTE — TELEPHONE ENCOUNTER
Scheduled date of colonoscopy (as of today):3/15/25  Physician performing colonoscopy:Dr Pradhan  Location of colonoscopy:[place]  Bowel prep reviewed with patient:sent oj/dul prep instructions to patient's [facility]   Instructions reviewed with patient by:sweetie  Clearances: Diabetic hold ozempic for 7 days, hold farxiga ( patient states he is taking this medication)for 4 days

## 2024-12-27 ENCOUNTER — OFFICE VISIT (OUTPATIENT)
Dept: PHYSICAL THERAPY | Facility: CLINIC | Age: 69
End: 2024-12-27
Payer: COMMERCIAL

## 2024-12-27 DIAGNOSIS — M54.2 POSTERIOR NECK PAIN: Primary | ICD-10-CM

## 2024-12-27 PROCEDURE — 97112 NEUROMUSCULAR REEDUCATION: CPT | Performed by: PHYSICAL THERAPIST

## 2024-12-27 PROCEDURE — 97140 MANUAL THERAPY 1/> REGIONS: CPT | Performed by: PHYSICAL THERAPIST

## 2024-12-27 PROCEDURE — 97110 THERAPEUTIC EXERCISES: CPT | Performed by: PHYSICAL THERAPIST

## 2025-01-02 ENCOUNTER — OFFICE VISIT (OUTPATIENT)
Dept: PHYSICAL THERAPY | Facility: CLINIC | Age: 70
End: 2025-01-02
Payer: COMMERCIAL

## 2025-01-02 DIAGNOSIS — M54.2 POSTERIOR NECK PAIN: Primary | ICD-10-CM

## 2025-01-02 PROCEDURE — 97110 THERAPEUTIC EXERCISES: CPT

## 2025-01-02 PROCEDURE — 97140 MANUAL THERAPY 1/> REGIONS: CPT

## 2025-01-02 NOTE — PROGRESS NOTES
"Daily Note     Today's date: 2025  Patient name: Dawson Jane  : 1955  MRN: [id number]  Referring provider: Raisa Michelle, *  Dx:   Encounter Diagnosis     ICD-10-CM    1. Posterior neck pain  M54.2           Start Time: 1055  Stop Time: 1200  Total time in clinic (min): 65 minutes    Subjective: Patient reports his shoulder felt very good after his previous therapy session but it is bothering him a bit more today.       Objective: See treatment diary below      Assessment: Tolerated treatment well. Patient only had increase in R shoulder pain with elevation to ~120* and had some improvements  in symptoms post manual therapy techniques. He continues to show limitations with cervical ROM and intolerance within weight bearing through RUE. Patient demonstrated fatigue post treatment, exhibited good technique with therapeutic exercises, and would benefit from continued PT to improve cervical mobility and R shoulder strength to reduce pain with functional activities.       Plan: Continue per plan of care.      Precautions: PMH MI, HTN, DM,  L RC repair  POC Expiration:      Manuals     Cervical stretching/ suboccipital release 10 min with Right Shoulder Mobs and stretching  20' with R shoulder mobs/ stretching 15' with R shoulder mobs/ stretching 15 min with Right Shoulder mobs and stretching  15' c R shoulder mobs and stretching    Thoracic PA mobs 5 min  NT  CROM mobs and stretching   10 min  SOR, cervical stretching 10'                     Neuro Re-Ed        Prone ITY NT    S/L Scap 4 way   2x10 Each   GWB  2x10 ea GWB   Prone rows NT   2x10   5#  2x10 10#   Prone chin tucks   Wall slides IYT 10x ea  NT IYT 10x Each  NT   Chin tucks against wall   C RFB 10x5\" hold  NT     Scap pinch 10x5\" Hold  10x5\" hold  10x5\" hold     Open books 10x5\" Hold Bilat  NT      Thoracic ext     Wall Push-up+   10x  NT   Ther Ex        UBE for ROM 10 min L1.3 Alt  10' 2.0 Alt 10' 1.5 Alt  10 " "min 1.5 Alt  10' 2.0 Alt   Supine Chin tucks 10x5\" Hold  10x 5\" hold  10x5\" hold  10x5\" Hold  10x5\" hold   UT self stretch   5x15\" hold ea bilat      3 way Doorway pec stretch 3x15\" Hold Each  3x15\" hold ea  NT     BL ER/ABD 2x10 Each  NT        10x5\" Hold  10x5\" hold  NT              HEP update/reviewed   5'     Ther Activity                          Gait Training                          Modalities        MHP 15 min Bilat UB  10' cervical and bilat UB 15' R shoulder 15 min Right Shoulder and UB  10' R shoulder and UB                                  "

## 2025-01-06 RX ORDER — OFLOXACIN 3 MG/ML
SOLUTION/ DROPS OPHTHALMIC
COMMUNITY
Start: 2024-12-30

## 2025-01-06 RX ORDER — PREDNISOLONE ACETATE 10 MG/ML
SUSPENSION/ DROPS OPHTHALMIC
COMMUNITY
Start: 2024-12-12

## 2025-01-07 ENCOUNTER — OFFICE VISIT (OUTPATIENT)
Dept: OBGYN CLINIC | Facility: CLINIC | Age: 70
End: 2025-01-07
Payer: COMMERCIAL

## 2025-01-07 ENCOUNTER — HOSPITAL ENCOUNTER (OUTPATIENT)
Dept: RADIOLOGY | Facility: CLINIC | Age: 70
Discharge: HOME | End: 2025-01-07
Payer: COMMERCIAL

## 2025-01-07 VITALS — HEIGHT: 67 IN | WEIGHT: 208.1 LBS | BODY MASS INDEX: 32.66 KG/M2

## 2025-01-07 DIAGNOSIS — M25.511 CHRONIC RIGHT SHOULDER PAIN: ICD-10-CM

## 2025-01-07 DIAGNOSIS — G89.29 CHRONIC RIGHT SHOULDER PAIN: ICD-10-CM

## 2025-01-07 DIAGNOSIS — G56.01 CARPAL TUNNEL SYNDROME ON RIGHT: ICD-10-CM

## 2025-01-07 DIAGNOSIS — M75.81 ROTATOR CUFF TENDONITIS, RIGHT: Primary | ICD-10-CM

## 2025-01-07 PROCEDURE — 99203 OFFICE O/P NEW LOW 30 MIN: CPT | Performed by: STUDENT IN AN ORGANIZED HEALTH CARE EDUCATION/TRAINING PROGRAM

## 2025-01-07 PROCEDURE — 73030 X-RAY EXAM OF SHOULDER: CPT

## 2025-01-07 PROCEDURE — 20610 DRAIN/INJ JOINT/BURSA W/O US: CPT | Performed by: STUDENT IN AN ORGANIZED HEALTH CARE EDUCATION/TRAINING PROGRAM

## 2025-01-07 RX ORDER — TRIAMCINOLONE ACETONIDE 40 MG/ML
40 INJECTION, SUSPENSION INTRA-ARTICULAR; INTRAMUSCULAR
Status: COMPLETED | OUTPATIENT
Start: 2025-01-07 | End: 2025-01-07

## 2025-01-07 RX ORDER — BUPIVACAINE HYDROCHLORIDE 5 MG/ML
3 INJECTION, SOLUTION EPIDURAL; INTRACAUDAL
Status: COMPLETED | OUTPATIENT
Start: 2025-01-07 | End: 2025-01-07

## 2025-01-07 RX ADMIN — TRIAMCINOLONE ACETONIDE 40 MG: 40 INJECTION, SUSPENSION INTRA-ARTICULAR; INTRAMUSCULAR at 08:30

## 2025-01-07 RX ADMIN — BUPIVACAINE HYDROCHLORIDE 3 ML: 5 INJECTION, SOLUTION EPIDURAL; INTRACAUDAL at 08:30

## 2025-01-07 NOTE — ASSESSMENT & PLAN NOTE
Dawson is a 69-year-old male with 2 months of atraumatic right shoulder pain.  His imaging and exam findings are consistent with rotator cuff tendinitis and impingement.  He has been in physical therapy for approximately the last 6 weeks focusing on his shoulder however he feels like physical therapy at this point is exacerbating his symptoms.  We discussed his diagnosis and proposed treatment options.  We discussed the possible role of corticosteroid injection to help decrease inflammation and maximize the benefit of physical therapy.  We also discussed the role of obtaining an MRI to further evaluate the shoulder and evaluate the soft tissue specifically the rotator cuff.  While certainly possible have a low suspicion for a massive rotator cuff tear given the fact that he has no trauma and relatively good strength on exam.  At this point Dawson would like to proceed with a subacromial steroid injection and continued physical therapy.  He will follow-up with me in 6 weeks for repeat clinical evaluation if his pain is failed to improve or is worsening at that time I would recommend an MRI to further evaluate the shoulder joint.  All questions were answered.  Additionally Dawson has chronic carpal tunnel syndrome    Which been treated nonoperatively with bracing he states is worsening over the past several months and he would like to see hand surgeon for further management.  Therefore, referral to Dr. Merchant was placed for further management of his carpal tunnel.  Orders:    XR shoulder 2+ vw right; Future    Ambulatory Referral to Physical Therapy; Future

## 2025-01-07 NOTE — PROGRESS NOTES
Assessment & Plan  Rotator cuff tendonitis, right Osman is a 69-year-old male with 2 months of atraumatic right shoulder pain.  His imaging and exam findings are consistent with rotator cuff tendinitis and impingement.  He has been in physical therapy for approximately the last 6 weeks focusing on his shoulder however he feels like physical therapy at this point is exacerbating his symptoms.  We discussed his diagnosis and proposed treatment options.  We discussed the possible role of corticosteroid injection to help decrease inflammation and maximize the benefit of physical therapy.  We also discussed the role of obtaining an MRI to further evaluate the shoulder and evaluate the soft tissue specifically the rotator cuff.  While certainly possible have a low suspicion for a massive rotator cuff tear given the fact that he has no trauma and relatively good strength on exam.  At this point Dawson would like to proceed with a subacromial steroid injection and continued physical therapy.  He will follow-up with me in 6 weeks for repeat clinical evaluation if his pain is failed to improve or is worsening at that time I would recommend an MRI to further evaluate the shoulder joint.  All questions were answered.  Additionally Dawson has chronic carpal tunnel syndrome    Which been treated nonoperatively with bracing he states is worsening over the past several months and he would like to see hand surgeon for further management.  Therefore, referral to Dr. Merchant was placed for further management of his carpal tunnel.  Orders:    XR shoulder 2+ vw right; Future    Ambulatory Referral to Physical Therapy; Future    Carpal tunnel syndrome on right    Orders:    Ambulatory Referral to Orthopedic Surgery; Future             General  Chief Complaint   Patient presents with    Right Shoulder - Pain, Clicking    Right Hand - Pain, Numbness        Subjective    Dawson Jane is a right hand dominant 69 y.o. male who presents with  right shoulder pain     History of Present Illness   The patient complains of right shoulder pain. The pain started 2 months ago without injury. At that time the patient describes doing physical therapy for chronic neck pain.  He states the therapy helped resolve his neck symptoms but aggravated his right shoulder.  He states over the last 6 to 8 weeks they have been doing focused physical therapy on his right shoulder however he has had no improvement in symptoms and perhaps some worsening.  Additionally he has been taking anti-inflammatory medications with minimal improvement of his symptoms.    The patient rates the pain as a 6/10. The pain is located Lateral.The pain is described as Sore. The patient has tried PT and NSAIDS for their problem.  The pain improves with rest. The pain worsens with any lifting or overhead motion.    The patient does have pain at night. The patient does have pain with overhead activity, and does not describe weakness.     The pain does not go past the elbow. The patient does have neck pain.The shoulder does not feel unstable. The patient does have numbness.    Ortho Sports Medicine Patient Answers  Failed to redirect to the Timeline version of the [employer] SmartLink.  Allergies   Allergen Reactions    Food Allergy [Soybean Oil - Food Allergy] Anaphylaxis     Organic soy milk. Pt reports having allergy shots at that time.     Outpatient Encounter Medications as of 1/7/2025   Medication Sig Dispense Refill    aspirin (ECOTRIN LOW STRENGTH) 81 mg EC tablet Take 81 mg by mouth every morning      atorvastatin (LIPITOR) 80 mg tablet Take 1 tablet (80 mg total) by mouth daily 90 tablet 1    carvedilol (COREG) 25 mg tablet Take 1 tablet (25 mg total) by mouth 2 (two) times a day with meals 180 tablet 2    citalopram (CeleXA) 10 mg tablet Take 1 tablet (10 mg total) by mouth daily 90 tablet 1    Coenzyme Q10 (CoQ-10) 100 MG CAPS Take 100 mg by mouth daily       dapagliflozin (Farxiga) 10 MG  tablet Take 1 tablet (10 mg total) by mouth daily 90 tablet 2    esomeprazole (NexIUM) 5 MG packet Take 5 mg by mouth daily 90 each 1    hydroCHLOROthiazide 25 mg tablet Take 1 tablet (25 mg total) by mouth daily 90 tablet 1    Multiple Vitamins-Minerals (PreserVision AREDS 2) CAPS Take 1 capsule by mouth daily       ofloxacin (OCUFLOX) 0.3 % ophthalmic solution       Ozempic, 1 MG/DOSE, 4 MG/3ML injection pen Inject 1 pen (1 mg total) under the skin every 7 days 9 mL 0    prednisoLONE acetate (PRED FORTE) 1 % ophthalmic suspension PUT 1 DROP EVERY 2 HOURS INTO RIGHT EYE TO BE STARTED AFTER SURGERY IS COMPLETED      ramipril (ALTACE) 10 MG capsule Take 1 capsule (10 mg total) by mouth daily 90 capsule 2    hydrocortisone (ANUSOL-HC) 25 mg suppository Insert 1 suppository (25 mg total) into the rectum 2 (two) times a day as needed for hemorrhoids (Patient not taking: Reported on 1/7/2025) 12 suppository 0    Ozempic, 1 MG/DOSE, 4 MG/3ML injection pen Inject 0.75 mL (1 mg total) under the skin every 7 days (Patient not taking: Reported on 1/7/2025) 9 mL 0     No facility-administered encounter medications on file as of 1/7/2025.     Past Medical History:   Diagnosis Date    Allergic Seasonal    Anesthesia     Pt reports that he became agitated after IV sedation for a colonoscopy    Coronary artery disease 08/2014    COVID-19 01/2021    Pt reports fever, fatigue, nausea and weakness. Pt denies breathing issues. Symptoms lasted 2 weeks.    Diabetes mellitus (HCC)     GERD (gastroesophageal reflux disease)     Hard to intubate     Pt received a letter stating that it was hard to visualize vocal chords    Heart attack (HCC)     High cholesterol     Hypertension     Myocardial infarction (HCC) 08/2014    Stent    Wears glasses      Past Surgical History:   Procedure Laterality Date    CARDIAC CATHETERIZATION  08/19/2014    Pt reports having one stent placed.    CHOLECYSTECTOMY      COLONOSCOPY      [facility] SURGICAL ARTHROSCOPY  SHOULDER XTNSV DBRDMT 3+ Left 2021    Procedure: ARTHROSCOPY SHOULDER , debridement; open rotator cuff repair;  Surgeon: Wayne Peace;  Location: [facility];  Service: Orthopedics    VASECTOMY      VASECTOMY REVERSAL       Family History   Problem Relation Age of Onset    Dementia Mother     No Known Problems Father      Social History     Tobacco Use    Smoking status: Former     Current packs/day: 0.00     Types: Cigarettes     Quit date: 1985     Years since quittin.9     Passive exposure: Past    Smokeless tobacco: Never   Vaping Use    Vaping status: Never Used   Substance Use Topics    Alcohol use: Yes     Alcohol/week: 5.0 standard drinks of alcohol     Types: 5 Cans of beer per week    Drug use: Never           Objective    There were no vitals filed for this visit.  Body mass index is 32.59 kg/m².  Physical Exam    Shoulder Exam    Affected shoulder:   right    Skin: Without ecchymosis, wounds or prior incisions    Tenderness:  None    Range of Motion (active/passive):  Side Active (FE/ER/IR) Passive (FE/ER/IR)   right 160/50/Lumbar 170/60   left 170/50/Lumbar 170/60       Rotator Cuff Strength:  Side Supraspinatus Infraspinatus/Teres Subscapularis   right 5/5 5/5 5/5   Left 5/5 5/5 5/5       Impingement and Rotator Cuff Exam:  Neer's test for impingement Positive   Russo' test for impingement Positive   Magui's test With pain   Belly Press Negative     Biceps Exam:  [place]'s test for SLAP tear: Negative   Jergeson's test for biciptal pain: Negative   Speeds test for biciptal pain: Negative       Shoulder Instability Exam:  The apprehension test for anterior instability: Negative   The relocation test: Negative   The posterior load and shift test: Negative         Cervical Spine Exam:  Tenderness: Mild paraspinal  ROM: WNL  Spurlings: Negative    Distally the patient's neurovascular status is abnormal: decreased sensation median nerve, +carpal tunnel compression test .    Additional exam:  NA    Review of Prior Testing:    I independently interpreted the following test:     right shoulder x-ray images done on 01/07/25  :  Multiple views show mild early glenohumeral osteoarthritis with tiny osteophyte at the inferior glenoid, moderate AC joint arthritis, chronic changes at the greater tuberosity, no evidence of humeral head elevation    Large joint arthrocentesis: R subacromial bursa  Universal Protocol:  Consent: Verbal consent obtained.  Risks and benefits: risks, benefits and alternatives were discussed  Consent given by: patient  Patient understanding: patient states understanding of the procedure being performed  Radiology Images displayed and confirmed. If images not available, report reviewed: imaging studies available  Supporting Documentation  Indications: pain   Procedure Details  Location: shoulder - R subacromial bursa  Preparation: Patient was prepped and draped in the usual sterile fashion  Needle size: 22 G  Ultrasound guidance: no  Approach: posterior  Medications administered: 40 mg triamcinolone acetonide 40 mg/mL; 3 mL bupivacaine (PF) 0.5 %    Patient tolerance: patient tolerated the procedure well with no immediate complications  Dressing:  Sterile dressing applied            Follow Up: Return in about 6 weeks (around 2/18/2025).    All questions answered and patient agrees with plan.

## 2025-01-08 ENCOUNTER — EVALUATION (OUTPATIENT)
Dept: PHYSICAL THERAPY | Facility: CLINIC | Age: 70
End: 2025-01-08
Payer: COMMERCIAL

## 2025-01-08 DIAGNOSIS — M54.2 POSTERIOR NECK PAIN: Primary | ICD-10-CM

## 2025-01-08 DIAGNOSIS — M75.81 ROTATOR CUFF TENDONITIS, RIGHT: ICD-10-CM

## 2025-01-08 PROCEDURE — 97140 MANUAL THERAPY 1/> REGIONS: CPT

## 2025-01-08 PROCEDURE — 97110 THERAPEUTIC EXERCISES: CPT

## 2025-01-08 PROCEDURE — 97535 SELF CARE MNGMENT TRAINING: CPT

## 2025-01-08 NOTE — PROGRESS NOTES
PT Discharge    Today's date: 2025  Patient name: Dawson Jane  : 1955  MRN: [id number]  Referring provider: Raisa Michelle, *  Dx:   Encounter Diagnosis     ICD-10-CM    1. Posterior neck pain  M54.2       2. Rotator cuff tendonitis, right  M75.81 Ambulatory Referral to Physical Therapy          Start Time: 0900  Stop Time: 1000  Total time in clinic (min): 60 minutes    Assessment  Impairments: abnormal or restricted ROM, impaired physical strength, lacks appropriate home exercise program, pain with function, participation limitations and activity limitations    Assessment details: Patient has been seen consistently by OP PT for 2 months and has made good progress towards his goals. His cervical range of motion has improved significantly, however, lateral flexion is still most limited, especially on the L. He has decreased pain and is demonstrating good BUE strength but has recently had an exacerbation of R shoulder pain. Due to this, he has been limited with some of the exercises that he can complete. He was recently seen by ortho for R shoulder and is planning to begin OP PT services to address impairments. Patient is going to transition to HEP for cervical spine at this time.     Goals  STG to be met within 4 weeks:  1. Pt will report 3/10 neck pain at worst to improve QOL. - met  2. Pt will increase B shoulder strength by 1/2 muscle grade to increase functional mobility. - met  3. Pt will be independent with HEP to improve cervical motion and reduce pain. - met  4. Pt will improve FOTO score by 10 points in order to improve functional mobility and quality of life. - met     LTG to be met within 8 weeks:  1. Pt will restore cervical AROM to WNL to improve activity tolerance and reduce pain with mobility. - not met  2. Pt will restore B shoulder strength to WNL to improve functional mobility and return to normal work duties. - met  3. Pt will meet FOTO score goal by DC to improve functional  mobility and return to PLOF. - met  4. Pt will be I in HEP for DC. - met         Plan    Planned therapy interventions: home exercise program, patient/caregiver education and self care    Treatment plan discussed with: patient        Subjective Evaluation    History of Present Illness  Mechanism of injury: Patient reports since beginning PT, he has made about 80% improvement in his neck and has little to no pain on a daily basis. He does reports some stiffness and cracking when rotating or side bending but overall, he is sleeping better and is happy with his progress.   Patient Goals  Patient goals for therapy: decreased pain and increased strength    Pain  Current pain ratin  At best pain ratin  At worst pain ratin  Quality: dull ache and sharp  Relieving factors: medications  Progression: worsening    Treatments  Current treatment: physical therapy        Objective     Postural Observations  Seated posture: poor  Standing posture: poor      Palpation   Left   Tenderness of the cervical paraspinals, suboccipitals and upper trapezius.     Right   Tenderness of the cervical paraspinals, suboccipitals and upper trapezius.     Active Range of Motion   Cervical/Thoracic Spine       Cervical    Flexion:  WFL  Extension: 43 degrees      Left lateral flexion: 30 degrees      Right lateral flexion: 40 degrees      Left rotation: 75 degrees  Right rotation: 80 degrees       Joint Play     Hypomobile: C3, C4, C5, C6, C7, T1, T2, T3, T4, T5, T6, T7 and T8     Strength/Myotome Testing     Left Shoulder     Planes of Motion   Flexion: 5   Extension: 5   Abduction: 5   External rotation at 0°: 5   Internal rotation at 0°: 5     Isolated Muscles   Middle trapezius: 4+     Right Shoulder     Planes of Motion   Flexion: 5   Extension: 5   Abduction: 5   External rotation at 0°: 5   Internal rotation at 0°: 5     Isolated Muscles   Middle trapezius: 4+     Left Elbow   Flexion: 5  Extension: 5    Right Elbow   Flexion:  "5  Extension: 5    Tests   Cervical   Positive cervical distraction test.  Negative vertical compression and alar ligament test.     Left   Negative Spurling's Test A and Spurling's Test B.     Right   Negative Spurling's Test A and Spurling's Test B.     Lumbar   Negative vertical compression.                Precautions: PMH MI, HTN, DM,  L RC repair  POC Expiration:1/8/25        Manuals 12/20 12/23 12/27 1/2 1/8    Cervical stretching/ suboccipital release 20' with R shoulder mobs/ stretching 15' with R shoulder mobs/ stretching 15 min with Right Shoulder mobs and stretching  15' c R shoulder mobs and stretching 15' no shoulder mobs    Thoracic PA mobs NT  CROM mobs and stretching   10 min  SOR, cervical stretching 10'                      Neuro Re-Ed        Prone ITY   S/L Scap 4 way   2x10 Each   GWB  2x10 ea GWB    Prone rows   2x10   5#  2x10 10#    Prone chin tucks  Wall slides IYT 10x ea  NT IYT 10x Each  NT    Chin tucks against wall  C RFB 10x5\" hold  NT      Scap pinch 10x5\" hold  10x5\" hold      Open books NT       Thoracic ext    Wall Push-up+   10x  NT    Ther Ex        UBE for ROM 10' 2.0 Alt 10' 1.5 Alt  10 min 1.5 Alt  10' 2.0 Alt 10' 2.0 Alt   Supine Chin tucks 10x 5\" hold  10x5\" hold  10x5\" Hold  10x5\" hold    UT self stretch  5x15\" hold ea bilat       3 way Doorway pec stretch 3x15\" hold ea  NT      BL ER/ABD NT         10x5\" hold  NT               HEP update/reviewed  5'   15'    Ther Activity                          Gait Training                          Modalities        MHP 10' cervical and bilat UB 15' R shoulder 15 min Right Shoulder and UB  10' R shoulder and UB 10' R shoulder              "

## 2025-01-10 ENCOUNTER — APPOINTMENT (OUTPATIENT)
Dept: PHYSICAL THERAPY | Facility: CLINIC | Age: 70
End: 2025-01-10
Payer: COMMERCIAL

## 2025-01-10 ENCOUNTER — EVALUATION (OUTPATIENT)
Dept: PHYSICAL THERAPY | Facility: CLINIC | Age: 70
End: 2025-01-10
Payer: COMMERCIAL

## 2025-01-10 DIAGNOSIS — M75.81 ROTATOR CUFF TENDONITIS, RIGHT: Primary | ICD-10-CM

## 2025-01-10 PROCEDURE — 97535 SELF CARE MNGMENT TRAINING: CPT

## 2025-01-10 PROCEDURE — 97161 PT EVAL LOW COMPLEX 20 MIN: CPT

## 2025-01-10 NOTE — PROGRESS NOTES
PT Evaluation     Today's date: 1/10/2025  Patient name: Dawson Jane  : 1955  MRN: [id number]  Referring provider: Parish Norris,*  Dx:   Encounter Diagnosis     ICD-10-CM    1. Rotator cuff tendonitis, right  M75.81           Start Time: 1000  Stop Time: 1025  Total time in clinic (min): 25 minutes    Assessment  Impairments: abnormal or restricted ROM, activity intolerance, impaired physical strength, lacks appropriate home exercise program, pain with function, poor posture , participation limitations and activity limitations    Assessment details: Patient is a 70 y/o male presenting to OP PT w/ c/o R shoulder pain. Upon evaluation, he presents with mild discomfort with end range of flexion and abduction and mild ROM deficits, good ROM with rotation and no pain recreation, equal BUE strength with little strength deficits and no recreation of pain. Patient was educated that his symptoms are most likely relieved from recent cortisone injection but he would benefit from strengthening periscapular musculature and rotator cuff to reduce likelihood of re-injury. Patient was agreeable to this and showed independence with HEP. He would benefit from skilled PT to improve R shoulder strength and stability to reduce risk for re-injury.    Goals  STG to be met within 4 weeks:  1. Pt will improve R shoulder AROM by 25-50% to improve functional mobility.  2. Pt will improve R shoulder strength by 1/2 muscle grade to improve functional mobility.   3. Pt will report 1/10 worst pain in L shoulder to improve QoL.   4. Pt will be I in HEP.     LTG to be met within 12 weeks:  1. Pt will restore full L shoulder AROM to WFL to improve functional mobility and return to PLOF.  2. Pt will restore full L shoulder strength to WFL to return to PLOF.  3. Pt will restore full LUE mobility to WFL to improve ADLs.  4. Pt will be I in HEP for DC.          Plan  Patient would benefit from: skilled physical therapy and PT  eval  Planned modality interventions: cryotherapy and thermotherapy: hydrocollator packs    Planned therapy interventions: flexibility, home exercise program, joint mobilization, manual therapy, neuromuscular re-education, patient/caregiver education, self care, postural training, strengthening, stretching and therapeutic exercise    Frequency: 2x week  Duration in weeks: 6  Treatment plan discussed with: patient        Subjective Evaluation    History of Present Illness  Mechanism of injury: Patient reports he was axe throwing about 2 months ago and had an onset of R shoulder pain afterwards. He then used a  a couple of weeks after that, causing an exacerbation of pain in the R shoulder. He was previously seen by OP PT at this clinic for chronic neck pain but that has since improved and he is now looking to improve R shoulder pain. He was seen by ortho on Tuesday where he received an injection and has significant improvement in his pain since then.  Patient Goals  Patient goals for therapy: decreased pain and increased strength    Pain  Current pain ratin  At best pain ratin  At worst pain rating: 3  Quality: sharp and knife-like  Relieving factors: medications  Aggravating factors: lifting and overhead activity  Progression: improved    Treatments  Previous treatment: injection treatment  Current treatment: physical therapy        Objective     Postural Observations  Seated posture: poor  Standing posture: poor      Palpation     Right   Tenderness of the supraspinatus and upper trapezius.     Tenderness     Right Shoulder  Tenderness in the supraspinatus tendon.     Neurological Testing     Sensation     Shoulder   Left Shoulder   Intact: light touch    Right Shoulder   Intact: Light touch    Active Range of Motion     Right Shoulder   Flexion: 135 degrees   Extension: WFL  Abduction: 135 degrees   External rotation 0°: WFL  External rotation 90°: WFL  Internal rotation 0°: WFL  Internal  rotation 90°: WFL    Strength/Myotome Testing     Right Shoulder     Planes of Motion   Flexion: 4+   Extension: 4+   Abduction: 4+   Adduction: 4+   External rotation at 0°: 4+   Internal rotation at 0°: 4+     Tests     Right Shoulder   Positive empty can.   Negative active compression ([place]), belly press, drop arm, Hawkin's, lift-off, painful arc and bicep load test positive.              Precautions: PMH MI, HTN, DM, L RC repair  POC Expiration: 2/21/25      Manuals 1/10       GH joint mobs        R shoulder PROM                        Neuro Re-Ed        TB rows and shoulder ext w/ scap retract        Prone IY w/ knuckle up, thumb up        William LPD palms in/out        Ball on wall circles        [place] MTP/LTP                        Ther Ex        UBE for strengthening/ ROM        Scaption        SL shoulder ER, flexion, horizontal abd, ABD        3 way doorway stretch                                HEP instruction/ educaiton 10'       Ther Activity                        Gait Training                        Modalities        CP PRN

## 2025-01-11 DIAGNOSIS — E11.00 TYPE 2 DIABETES MELLITUS WITH HYPEROSMOLARITY WITHOUT COMA, WITHOUT LONG-TERM CURRENT USE OF INSULIN (HCC): ICD-10-CM

## 2025-01-13 RX ORDER — SEMAGLUTIDE 1.34 MG/ML
1 INJECTION, SOLUTION SUBCUTANEOUS
Qty: 9 ML | Refills: 0 | Status: SHIPPED | OUTPATIENT
Start: 2025-01-13

## 2025-01-14 ENCOUNTER — OFFICE VISIT (OUTPATIENT)
Dept: OBGYN CLINIC | Facility: CLINIC | Age: 70
End: 2025-01-14
Payer: COMMERCIAL

## 2025-01-14 VITALS — HEIGHT: 67 IN | WEIGHT: 202.4 LBS | BODY MASS INDEX: 31.77 KG/M2

## 2025-01-14 DIAGNOSIS — G56.01 CARPAL TUNNEL SYNDROME ON RIGHT: Primary | ICD-10-CM

## 2025-01-14 PROCEDURE — 99213 OFFICE O/P EST LOW 20 MIN: CPT | Performed by: STUDENT IN AN ORGANIZED HEALTH CARE EDUCATION/TRAINING PROGRAM

## 2025-01-14 NOTE — PROGRESS NOTES
ASSESSMENT/PLAN:    1. Carpal tunnel syndrome on right  Ambulatory Referral to Orthopedic Surgery          69 y.o. male presents with signs and symptoms consistent with the above diagnosis.  We discussed the natural history of this condition and its pathogenesis.  We discussed operative and nonoperative treatment options.     The anatomy and physiology of carpal tunnel syndrome was discussed with the patient today.  Increase pressure localized under the transverse carpal ligament can cause pain, numbness, tingling, or dysesthesias within the median nerve distribution as well as feelings of fatigue, clumsiness, or awkwardness.  These symptoms typically occur at night and worse in the morning upon waking.  Eventually, untreated carpal tunnel syndrome can result in weakness and permanent loss of muscle within the thenar compartment of the hand.  Treatment options were discussed with the patient, including nonoperative and operative options.  Conservative treatment includes nocturnal resting splints to keep the nerve in a neutral position, ergonomic changes within the work or home environment, activity modification, and physical therapy.  Vitamin B6 one tablet daily over the counter may helpful to reduce symptoms.  Steroid injections within the carpal canal can help a majority of patients, however this is often self-limited in a majority of patients.  Surgical options focus on interventions to divide the transverse carpal ligament typically results in a long-lasting relief of the patient's complaints, with the recurrence rate of less than 1%. The patient has an understanding of the above mentioned discussion.The risks and benefits of the procedure were explained to the patient, which include, but are not limited to: Bleeding, infection, recurrence, pain, scar, damage to tendons, damage to nerves, and damage to blood vessels, failure to give desired results and complications related to anesthesia.  These risks, along  "with alternative conservative treatment options, and postoperative protocols were voiced back and understood by the patient.      The patient elected to proceed with conservative management.  All questions were answered.      After a thorough discussion of risks, benefits, and alternatives, patient is indicated for non-operative treatment. Reviewed previous A1C.  Continue use of removable wrist splint to wear at night.   Begin hand therapy for nerve gliding exercises.   OTC NSAIDs/tylenol for pain associated  Follow up as needed.   If the symptoms do not improve, they may require an ultrasound or EMG to confirm diagnosis followed by surgical intervention in form of carpal tunnel release.    Advised that HgbA1c would need to be below 7.5 if surgical intervention is pursued.   Patient/Guardian verbalizes understanding and consent to treatment plan. All questions answered.      _____________________________________________________  CHIEF COMPLAINT:  right hand pain, numbness and tingling.     Referred By:  Parish Norris Md  [address] [place] [id number]    SUBJECTIVE:  Dawson Jane is a 69 y.o. ambidextrous  male who presents today for evaluation of right hand. He reports that they have had numbness/tingling in their hand for 20 years. Patient reports he has recently had an increased flare of symptoms. Notes affecting their ADLs. Patient reports he has stinging and feeling of \"fat feeling\" in the hand at night. Patient feels he has to massage the hands to relieve the sensation.  He does have that feeling of numbness in his hand primarily in the radial 3 digits that occurs during the day with driving and repetitive activities.  He reports that about 20 years ago he did receive an EMG and at that time was diagnosed with mild carpal tunnel syndrome.    Location of the pain: base of right hand  Quality of  pain: burning.   Aggravating symptoms:driving and fine motor tasks and buttoning pants. "   Night symptoms: yes  Tingling: yes  Constant numbness: no  Dropping objects: yes  Increase in symptoms with increase use : yes    The treatment so far includes: bracing with some relief    Tobacco use: none  Alcohol use: 3 drinks per day    Occupation: retired    ADLs: Community ambulator      PAST MEDICAL HISTORY:  Past Medical History:   Diagnosis Date    Allergic Seasonal    Anesthesia     Pt reports that he became agitated after IV sedation for a colonoscopy    Coronary artery disease 2014    COVID-19 2021    Pt reports fever, fatigue, nausea and weakness. Pt denies breathing issues. Symptoms lasted 2 weeks.    Diabetes mellitus (HCC)     GERD (gastroesophageal reflux disease)     Hard to intubate     Pt received a letter stating that it was hard to visualize vocal chords    Heart attack (HCC)     High cholesterol     Hypertension     Myocardial infarction (HCC) 2014    Stent    Wears glasses        PAST SURGICAL HISTORY:  Past Surgical History:   Procedure Laterality Date    CARDIAC CATHETERIZATION  2014    Pt reports having one stent placed.    CHOLECYSTECTOMY      COLONOSCOPY      NM SURGICAL ARTHROSCOPY SHOULDER XTNSV DBRDMT 3+ Left 2021    Procedure: ARTHROSCOPY SHOULDER , debridement; open rotator cuff repair;  Surgeon: Wayne Peace;  Location:  MAIN OR;  Service: Orthopedics    VASECTOMY      VASECTOMY REVERSAL         FAMILY HISTORY:  Family History   Problem Relation Age of Onset    Dementia Mother     No Known Problems Father        SOCIAL HISTORY:  Social History     Tobacco Use    Smoking status: Former     Current packs/day: 0.00     Types: Cigarettes     Quit date: 1985     Years since quittin.9     Passive exposure: Past    Smokeless tobacco: Never   Vaping Use    Vaping status: Never Used   Substance Use Topics    Alcohol use: Yes     Alcohol/week: 5.0 standard drinks of alcohol     Types: 5 Cans of beer per week    Drug use: Never       MEDICATIONS:    Current  Outpatient Medications:     aspirin (ECOTRIN LOW STRENGTH) 81 mg EC tablet, Take 81 mg by mouth every morning, Disp: , Rfl:     atorvastatin (LIPITOR) 80 mg tablet, Take 1 tablet (80 mg total) by mouth daily, Disp: 90 tablet, Rfl: 1    carvedilol (COREG) 25 mg tablet, Take 1 tablet (25 mg total) by mouth 2 (two) times a day with meals, Disp: 180 tablet, Rfl: 2    citalopram (CeleXA) 10 mg tablet, Take 1 tablet (10 mg total) by mouth daily, Disp: 90 tablet, Rfl: 1    Coenzyme Q10 (CoQ-10) 100 MG CAPS, Take 100 mg by mouth daily , Disp: , Rfl:     dapagliflozin (Farxiga) 10 MG tablet, Take 1 tablet (10 mg total) by mouth daily, Disp: 90 tablet, Rfl: 2    esomeprazole (NexIUM) 5 MG packet, Take 5 mg by mouth daily, Disp: 90 each, Rfl: 1    FIBER ADULT GUMMIES PO, Take by mouth, Disp: , Rfl:     hydroCHLOROthiazide 25 mg tablet, Take 1 tablet (25 mg total) by mouth daily, Disp: 90 tablet, Rfl: 1    Multiple Vitamins-Minerals (PreserVision AREDS 2) CAPS, Take 1 capsule by mouth daily , Disp: , Rfl:     ofloxacin (OCUFLOX) 0.3 % ophthalmic solution, , Disp: , Rfl:     Ozempic, 1 MG/DOSE, 4 MG/3ML injection pen, Inject 1 pen (1 mg total) under the skin every 7 days, Disp: 9 mL, Rfl: 0    prednisoLONE acetate (PRED FORTE) 1 % ophthalmic suspension, PUT 1 DROP EVERY 2 HOURS INTO RIGHT EYE TO BE STARTED AFTER SURGERY IS COMPLETED, Disp: , Rfl:     ramipril (ALTACE) 10 MG capsule, Take 1 capsule (10 mg total) by mouth daily, Disp: 90 capsule, Rfl: 2    hydrocortisone (ANUSOL-HC) 25 mg suppository, Insert 1 suppository (25 mg total) into the rectum 2 (two) times a day as needed for hemorrhoids (Patient not taking: Reported on 1/14/2025), Disp: 12 suppository, Rfl: 0    Ozempic, 1 MG/DOSE, 4 MG/3ML injection pen, Inject 0.75 mL (1 mg total) under the skin every 7 days (Patient not taking: Reported on 1/14/2025), Disp: 9 mL, Rfl: 0    ALLERGIES:  Allergies   Allergen Reactions    Food Allergy [Soybean Oil - Food Allergy]  "Anaphylaxis     Organic soy milk. Pt reports having allergy shots at that time.       REVIEW OF SYSTEMS:  Pertinent items are noted in HPI.  A comprehensive review of systems was negative.    LABS:  HgA1c:   Lab Results   Component Value Date    HGBA1C 7.9 (A) 11/12/2024     BMP:   Lab Results   Component Value Date    CALCIUM 9.6 01/30/2024    K 3.6 01/30/2024    CO2 30 01/30/2024     01/30/2024    BUN 15 01/30/2024    CREATININE 0.85 01/30/2024         _____________________________________________________  PHYSICAL EXAMINATION:  Ht 5' 7\" (1.702 m)   Wt 91.8 kg (202 lb 6.4 oz)   BMI 31.70 kg/m²     Gen: A&Ox3, NAD  Cardiac: regular rate  Chest: non labored breathing  Abdomen: Non-distended    Cervcial Spine: Negative Spurling's and negative posada     MUSCULOSKELETAL EXAMINATION:  Right Hand  Digital motion is full    FDS/FDP/FPL/finger extensors intact     negative Finkelstein's  negative Triggering     Neuro Exam:  Positive CT Durkan's compression test   Positive Tinel at CT   Positive Phalen   There is no atrophy of the thenar muscles.  5/5 APB strength   5/5 FPL strength   5/5 First dorsal IO strength   5/5 Abd-DQ strength   5/5 EPL strength  5/5 FDP to index and small fingers  negative tinel at the elbow   negative pain to palpation over the ulnar nerve   Negative ulnar compression test  Sensation intact to light touch in FDWS (radial), volar IF (median), volar SF (ulnar)  Two-Point Discrimination testing   Right radial (mm) Right ulnar (mm)   Thumb 5 5   Index 5 5   Long 5 5   Ring 5 5   Small 5 5      Vascular Exam: < 2 sec capillary refill     CTS-6:  Numbness in median nerve distribution (3.5): y  Nocturnal numbness (4): y  Thenar atrophy/weakness(5): n  Positive Phalen test (5): y  Loss of 2pt discrimination (4.5): n  Positive Tinel sign (4): y  Total: 17.5/26 (12)    _____________________________________________________  STUDIES REVIEWED:  None available for review today.       Valeri " Attestation      I,:  Shannan Jiménez PA-C am acting as a scribe while in the presence of the attending physician.:       I,:  Juan Merchant MD personally performed the services described in this documentation    as scribed in my presence.:

## 2025-01-15 ENCOUNTER — OFFICE VISIT (OUTPATIENT)
Dept: PHYSICAL THERAPY | Facility: CLINIC | Age: 70
End: 2025-01-15
Payer: COMMERCIAL

## 2025-01-15 ENCOUNTER — APPOINTMENT (OUTPATIENT)
Dept: PHYSICAL THERAPY | Facility: CLINIC | Age: 70
End: 2025-01-15
Payer: COMMERCIAL

## 2025-01-15 DIAGNOSIS — E11.00 TYPE 2 DIABETES MELLITUS WITH HYPEROSMOLARITY WITHOUT COMA, WITHOUT LONG-TERM CURRENT USE OF INSULIN (HCC): ICD-10-CM

## 2025-01-15 DIAGNOSIS — M75.81 ROTATOR CUFF TENDONITIS, RIGHT: Primary | ICD-10-CM

## 2025-01-15 DIAGNOSIS — M54.2 POSTERIOR NECK PAIN: ICD-10-CM

## 2025-01-15 PROCEDURE — 97112 NEUROMUSCULAR REEDUCATION: CPT

## 2025-01-15 PROCEDURE — 97110 THERAPEUTIC EXERCISES: CPT

## 2025-01-15 PROCEDURE — 97140 MANUAL THERAPY 1/> REGIONS: CPT

## 2025-01-15 RX ORDER — RAMIPRIL 10 MG/1
10 CAPSULE ORAL DAILY
Qty: 90 CAPSULE | Refills: 1 | Status: SHIPPED | OUTPATIENT
Start: 2025-01-15

## 2025-01-15 RX ORDER — DAPAGLIFLOZIN 10 MG/1
10 TABLET, FILM COATED ORAL DAILY
Qty: 90 TABLET | Refills: 1 | Status: SHIPPED | OUTPATIENT
Start: 2025-01-15

## 2025-01-15 NOTE — PROGRESS NOTES
Daily Note     Today's date: 1/15/2025  Patient name: Dawson Jane  : 1955  MRN: [id number]  Referring provider: Parish Norris,*  Dx:   Encounter Diagnosis     ICD-10-CM    1. Rotator cuff tendonitis, right  M75.81       2. Posterior neck pain  M54.2           Start Time: 0900  Stop Time: 0945  Total time in clinic (min): 45 minutes    Subjective: Patient reports his R shoulder is feeling really good and he has little to no pain since getting injection.      Objective: See treatment diary below      Assessment: Tolerated treatment well. Patient tolerates exercises well with no recreation of pain. Patient demonstrated fatigue post treatment, exhibited good technique with therapeutic exercises, and would benefit from continued PT to improve R shoulder mobility and strength to improve overhead tolerance.       Plan: Continue per plan of care.      Precautions: PMH MI, HTN, DM, L RC repair  POC Expiration: 25      Manuals 1/10 1/15      GH joint mobs  5'      R shoulder PROM  5'                      Neuro Re-Ed        Scapular clocks  10x RTB      Prone IY w/ knuckle up, thumb up        William LPD palms in/out        Ball on wall circles        [place] MTP/LTP  2x10 ea 15#                      Ther Ex        UBE for strengthening/ ROM  10' 1.0 Alt      Scaption  2x10 RTB      SL shoulder ER, flexion, horizontal abd, ABD  2x10 ea       Shoulder ER/IR  2x10 ea Black                               HEP instruction/ educaiton 10'       Ther Activity                        Gait Training                        Modalities        CP PRN declined

## 2025-01-17 ENCOUNTER — APPOINTMENT (OUTPATIENT)
Dept: PHYSICAL THERAPY | Facility: CLINIC | Age: 70
End: 2025-01-17
Payer: COMMERCIAL

## 2025-01-17 ENCOUNTER — OFFICE VISIT (OUTPATIENT)
Dept: PHYSICAL THERAPY | Facility: CLINIC | Age: 70
End: 2025-01-17
Payer: COMMERCIAL

## 2025-01-17 DIAGNOSIS — M75.81 ROTATOR CUFF TENDONITIS, RIGHT: Primary | ICD-10-CM

## 2025-01-17 DIAGNOSIS — M54.2 POSTERIOR NECK PAIN: ICD-10-CM

## 2025-01-17 PROCEDURE — 97140 MANUAL THERAPY 1/> REGIONS: CPT

## 2025-01-17 PROCEDURE — 97112 NEUROMUSCULAR REEDUCATION: CPT

## 2025-01-17 PROCEDURE — 97110 THERAPEUTIC EXERCISES: CPT

## 2025-01-17 NOTE — PROGRESS NOTES
Daily Note     Today's date: 2025  Patient name: Dawson Jane  : 1955  MRN: [id number]  Referring provider: Parish Norris,*  Dx:   Encounter Diagnosis     ICD-10-CM    1. Rotator cuff tendonitis, right  M75.81       2. Posterior neck pain  M54.2           Start Time: 0900  Stop Time: 0945  Total time in clinic (min): 45 minutes    Subjective: Patient reports he is feeling really good and does not have much pain in his R shoulder.       Objective: See treatment diary below      Assessment: Tolerated treatment well. Patient had no recreation of pain throughout today's therapy session and showed improvement in periscapular strength. Patient demonstrated fatigue post treatment, exhibited good technique with therapeutic exercises, and would benefit from continued PT to improve R shoulder mobility and strength to reduce risk for re-injury.      Plan: Continue per plan of care.      Precautions: PMH MI, HTN, DM, L RC repair  POC Expiration: 25      Manuals 1/10 1/15 1/17     GH joint mobs  5' 5'     R shoulder PROM  5' 5'                     Neuro Re-Ed        Scapular clocks  10x RTB 10x RTB     Prone IY w/ knuckle up, thumb up        William LPD palms in/out   2x10 ea 20#     Ball on wall circles        [place] MTP/LTP  2x10 ea 15# 2x10 ea 15#                     Ther Ex        UBE for strengthening/ ROM  10' 1.0 Alt 10' 2.0 Alt     Scaption  2x10 RTB 2x10 RTB     SL shoulder ER, flexion, horizontal abd, ABD  2x10 ea  2x10 ea 2#     Shoulder ER/IR  2x10 ea Black  NT                             HEP instruction/ educaiton 10'       Ther Activity                        Gait Training                        Modalities        CP PRN declined declined

## 2025-01-22 ENCOUNTER — OFFICE VISIT (OUTPATIENT)
Dept: PHYSICAL THERAPY | Facility: CLINIC | Age: 70
End: 2025-01-22
Payer: COMMERCIAL

## 2025-01-22 ENCOUNTER — APPOINTMENT (OUTPATIENT)
Dept: PHYSICAL THERAPY | Facility: CLINIC | Age: 70
End: 2025-01-22
Payer: COMMERCIAL

## 2025-01-22 DIAGNOSIS — M75.81 ROTATOR CUFF TENDONITIS, RIGHT: Primary | ICD-10-CM

## 2025-01-22 DIAGNOSIS — M54.2 POSTERIOR NECK PAIN: ICD-10-CM

## 2025-01-22 PROCEDURE — 97140 MANUAL THERAPY 1/> REGIONS: CPT

## 2025-01-22 PROCEDURE — 97110 THERAPEUTIC EXERCISES: CPT

## 2025-01-22 PROCEDURE — 97112 NEUROMUSCULAR REEDUCATION: CPT

## 2025-01-22 NOTE — PROGRESS NOTES
Daily Note     Today's date: 2025  Patient name: Dawson Jane  : 1955  MRN: [id number]  Referring provider: Parish Norris,*  Dx:   Encounter Diagnosis     ICD-10-CM    1. Rotator cuff tendonitis, right  M75.81       2. Posterior neck pain  M54.2                      Subjective: Patient reports some soreness post snow blowing the other day, but reports that symptoms subsided later on.      Objective: See treatment diary below      Assessment: Tolerated treatment well. Patient exhibited good technique with therapeutic exercises and would benefit from continued PT to increase R shoulder ROM/strength and endurance to improve his functional mobility.  Patient able to tolerate his therapeutic ex program well c no increase in symptoms post.      Plan: Continue per plan of care.      Precautions: PMH MI, HTN, DM, L RC repair  POC Expiration: 25      Manuals 1/10 1/15 1/17 1/22    GH joint mobs  5' 5' 5'    R shoulder PROM  5' 5' 5'                    Neuro Re-Ed        Scapular clocks  10x RTB 10x RTB 10x RTB    Prone IY w/ knuckle up, thumb up        [place] LPD palms in/out   2x10 ea 20# 20# 2x10ea    Ball on wall circles        William MTP/LTP  2x10 ea 15# 2x10 ea 15# 15# 2x10ea                    Ther Ex        UBE for strengthening/ ROM  10' 1.0 Alt 10' 2.0 Alt 10' 2.0alt    [place]  2x10 RTB 2x10 RTB 2x10 RTB    SL shoulder ER, flexion, horizontal abd, ABD  2x10 ea  2x10 ea 2# 2# 2x10ea    Shoulder ER/IR  2x10 ea Black  NT 2x10 Black                            HEP instruction/ educaiton 10'       Ther Activity                        Gait Training                        Modalities        CP PRN declined declined

## 2025-01-23 NOTE — PROGRESS NOTES
Daily Note     Today's date: 2025  Patient name: Dawson Jane  : 1955  MRN: [id number]  Referring provider: Parish Norris,*  Dx:   Encounter Diagnosis     ICD-10-CM    1. Rotator cuff tendonitis, right  M75.81       2. Posterior neck pain  M54.2                      Subjective: Patient reports that he is doing really well and would like to be discharged from OP PT.      Objective: See treatment diary below      Assessment: Tolerated treatment well. Patient to transition from skilled rehab to HEP today.  Patient to continue with his current HEP.  Patient demonstrates a good understanding of his HEP and the importance of remaining compliant with it.      Plan: Patient discharged to HEP at this time.     Precautions: PMH MI, HTN, DM, L RC repair  POC Expiration: 25      Manuals 1/10 1/15 1/17 1/22 1/24   GH joint mobs  5' 5' 5' 5'   R shoulder PROM  5' 5' 5' 5'                   Neuro Re-Ed       Scapular clocks  10x RTB 10x RTB 10x RTB 10x RTB   Prone IY w/ knuckle up, thumb up        [place] LPD palms in/out   2x10 ea 20# 20# 2x10ea 20#2x10ea   Ball on wall circles        William MTP/LTP  2x10 ea 15# 2x10 ea 15# 15# 2x10ea 15#2x10ea                   Ther Ex       UBE for strengthening/ ROM  10' 1.0 Alt 10' 2.0 Alt 10' 2.0alt 10' 2.0alt   [place]  2x10 RTB 2x10 RTB 2x10 RTB 2x10 RTB   SL shoulder ER, flexion, horizontal abd, ABD  2x10 ea  2x10 ea 2# 2# 2x10ea 2# 2x10ea    Shoulder ER/IR  2x10 ea Black  NT 2x10 Black 2x10 Black                           HEP instruction/ educaiton 10'       Ther Activity                       Gait Training                       Modalities       CP PRN declined declined

## 2025-01-24 ENCOUNTER — APPOINTMENT (OUTPATIENT)
Dept: PHYSICAL THERAPY | Facility: CLINIC | Age: 70
End: 2025-01-24
Payer: COMMERCIAL

## 2025-01-24 ENCOUNTER — OFFICE VISIT (OUTPATIENT)
Dept: PHYSICAL THERAPY | Facility: CLINIC | Age: 70
End: 2025-01-24
Payer: COMMERCIAL

## 2025-01-24 DIAGNOSIS — M75.81 ROTATOR CUFF TENDONITIS, RIGHT: Primary | ICD-10-CM

## 2025-01-24 DIAGNOSIS — M54.2 POSTERIOR NECK PAIN: ICD-10-CM

## 2025-01-24 PROCEDURE — 97140 MANUAL THERAPY 1/> REGIONS: CPT

## 2025-01-24 PROCEDURE — 97112 NEUROMUSCULAR REEDUCATION: CPT

## 2025-01-24 PROCEDURE — 97110 THERAPEUTIC EXERCISES: CPT

## 2025-01-24 NOTE — PROGRESS NOTES
PT Discharge    Today's date: 2025  Patient name: Dawson Jane  : 1955  MRN: [id number]  Referring provider: Parish Norris,*  Dx:   Encounter Diagnosis     ICD-10-CM    1. Rotator cuff tendonitis, right  M75.81       2. Posterior neck pain  M54.2           Start Time: 09  Stop Time: 1005  Total time in clinic (min): 60 minutes    Assessment  Impairments: abnormal or restricted ROM, activity intolerance, impaired physical strength, lacks appropriate home exercise program, pain with function, poor posture , participation limitations and activity limitations    Assessment details: Patient has been seen by OP PT for ~2 weeks and he has made great progress towards goals. Since receiving injection, his mobility and strength has improved to WFL and he is relatively pain free. He was able to tolerate snow removal a few days ago without exacerbating R shoulder pain. Patient has met his goals for therapy and will be dc'd at this time.     Goals  STG to be met within 4 weeks:  1. Pt will improve R shoulder AROM by 25-50% to improve functional mobility. - met  2. Pt will improve R shoulder strength by 1/2 muscle grade to improve functional mobility. - met  3. Pt will report 1/10 worst pain in L shoulder to improve QoL. - met  4. Pt will be I in HEP. - met     LTG to be met within 12 weeks:  1. Pt will restore full L shoulder AROM to WFL to improve functional mobility and return to PLOF. - met  2. Pt will restore full L shoulder strength to WFL to return to PLOF. - met  3. Pt will restore full LUE mobility to WFL to improve ADLs. - met  4. Pt will be I in HEP for DC. - met         Plan    Planned therapy interventions: home exercise program, patient/caregiver education and self care    Treatment plan discussed with: patient  Plan details: Transition to HEP.        Subjective Evaluation    History of Present Illness  Mechanism of injury: Patient reports he was axe throwing about 2 months ago and had an  onset of R shoulder pain afterwards. He then used a  a couple of weeks after that, causing an exacerbation of pain in the R shoulder. He was previously seen by OP PT at this clinic for chronic neck pain but that has since improved and he is now looking to improve R shoulder pain. He was seen by ortho on Tuesday where he received an injection and has significant improvement in his pain since then.  Patient Goals  Patient goals for therapy: decreased pain and increased strength    Pain  Current pain ratin  At best pain ratin  At worst pain rating: 3  Quality: sharp and knife-like  Relieving factors: medications  Aggravating factors: lifting and overhead activity  Progression: improved    Treatments  Previous treatment: injection treatment  Current treatment: physical therapy        Objective     Postural Observations  Seated posture: poor  Standing posture: poor      Palpation     Right   Tenderness of the supraspinatus and upper trapezius.     Tenderness     Right Shoulder  Tenderness in the supraspinatus tendon.     Neurological Testing     Sensation     Shoulder   Left Shoulder   Intact: light touch    Right Shoulder   Intact: Light touch    Active Range of Motion     Right Shoulder   Flexion: 135 degrees   Extension: WFL  Abduction: 135 degrees   External rotation 0°: WFL  External rotation 90°: WFL  Internal rotation 0°: WFL  Internal rotation 90°: WFL    Strength/Myotome Testing     Right Shoulder     Planes of Motion   Flexion: 4+   Extension: 4+   Abduction: 4+   Adduction: 4+   External rotation at 0°: 4+   Internal rotation at 0°: 4+     Tests     Right Shoulder   Positive empty can.   Negative active compression ([place]), belly press, drop arm, Hawkin's, lift-off, painful arc and bicep load test positive.              Precautions: PMH MI, HTN, DM, L RC repair  POC Expiration: 25

## 2025-01-29 ENCOUNTER — APPOINTMENT (OUTPATIENT)
Dept: PHYSICAL THERAPY | Facility: CLINIC | Age: 70
End: 2025-01-29
Payer: COMMERCIAL

## 2025-01-29 ENCOUNTER — EVALUATION (OUTPATIENT)
Dept: PHYSICAL THERAPY | Facility: CLINIC | Age: 70
End: 2025-01-29
Payer: COMMERCIAL

## 2025-01-29 DIAGNOSIS — G56.01 CARPAL TUNNEL SYNDROME ON RIGHT: ICD-10-CM

## 2025-01-29 PROCEDURE — 97140 MANUAL THERAPY 1/> REGIONS: CPT

## 2025-01-29 PROCEDURE — 97161 PT EVAL LOW COMPLEX 20 MIN: CPT

## 2025-01-29 NOTE — PROGRESS NOTES
PT Evaluation     Today's date: 2025  Patient name: Dawson Jane  : 1955  MRN: [id number]  Referring provider: Juan Merchant MD  Dx:   Encounter Diagnosis     ICD-10-CM    1. Carpal tunnel syndrome on right  G56.01 Ambulatory Referral to Physical Therapy          Start Time: 910  Stop Time: 945  Total time in clinic (min): 35 minutes    Assessment  Impairments: abnormal or restricted ROM, activity intolerance, impaired physical strength, lacks appropriate home exercise program, pain with function, participation limitations and activity limitations    Assessment details: Patient is a 70 y/o male presenting to OP PT w/ c/o R wrist/hand pain. Patient demonstrates good wrist ROM with only deficits seen with wrist extension. Patient also demonstrates good wrist and  strength with no difference between L and R, in fact, R  strength is stronger than L. Patient only had recreation of pain with phalen's test but due to subjective report, h/o pain, and past imaging, patient's s/s are consistent with carpal tunnel. Patient was educated in activity modification, use of bracing, and HEP for wrist stretching and tendon gliding to reduce compression on median nerve. Shockwave therapy was completed today to reduce compression and patient had positive response to treatment. Patient would benefit from skilled PT to improve wrist mobility and decompress the median nerve to improve QoL.    Goals  STG to be met within 4 weeks:  1. Pt will improve R wrist mobility by 25-50% to improve functional mobility.  3. Pt will increase R wrist strength by 1/2 muscle grades on MMT to improve functional mobility.  4. Pt will demonstrate independence in HEP to improve functional mobility.   5. Pt will improve FOTO score by 10 points to improve mobility.    LTG to be met within 6 weeks:  1. Pt will restore R wrist mobility to WFL to improve functional mobility.  2. Pt will restore R wrist strength to WFL to improve  functional mobility.  4. Pt will be I in HEP for DC.   5. Pt will meet FOTO score goal for DC.        Plan  Patient would benefit from: skilled physical therapy and PT eval  Planned modality interventions: cryotherapy, thermotherapy: hydrocollator packs and manual electrical stimulation    Planned therapy interventions: balance/weight bearing training, flexibility, functional ROM exercises, home exercise program, joint mobilization, abdominal trunk stabilization, nerve gliding, neuromuscular re-education, patient/caregiver education, self care, strengthening, stretching and therapeutic exercise    Frequency: 2x week  Duration in weeks: 8  Treatment plan discussed with: patient      Subjective Evaluation    History of Present Illness  Mechanism of injury: Patient reports he has had R wrist pain for many years and used to have difficulty with his work duties to the fine motor tasks. Patient reports he had an EMG in the past which showed median nerve compression within the carpal tunnel of his R wrist but he denies having release or other conservative treatments. Patient states he does not feel it has worsened or improved since it began but when he has a flare up, the pain is very severe. He states sleeping, doing fine motor tasks, driving, and using a computer mouse cause him the most discomfort. Patient reports numbness in the median nerve distribution when it does flare up.  Pain  Current pain ratin  At best pain ratin  At worst pain ratin  Location: R hand  Quality: dull ache, radiating and sharp  Relieving factors: support  Aggravating factors: keyboarding (driving, twisting, sleeping)  Progression: improved          Objective     Palpation     Additional Palpation Details  No TTP    Tenderness     Additional Tenderness Details  No TTP     Neurological Testing     Sensation     Wrist/Hand   Left   Intact: light touch    Right   Intact: light touch    Active Range of Motion     Right Wrist   Wrist  flexion: WFL  Wrist extension: 50 degrees   Radial deviation: WFL  Ulnar deviation: WFL    Strength/Myotome Testing     Left Wrist/Hand   Wrist extension: 5  Wrist flexion: 5  Radial deviation: 5  Ulnar deviation: 5     (2nd hand position)     Trial 1: 70    Trial 2: 65    Trial 3: 67    Average: 67.33    Thumb Strength  Key/Lateral Pinch     Trial 1: 15    Trial 2: 15    Trial 3: 15    Average: 15    Right Wrist/Hand   Wrist extension: 5  Wrist flexion: 5  Radial deviation: 5  Ulnar deviation: 5     (2nd hand position)     Trial 1: 75    Trial 2: 80    Trial 3: 78    Average: 77.67    Thumb Strength   Key/Lateral Pinch     Trial 1: 16    Trial 2: 16    Trial 3: 15    Average: 15.67    Tests     Right Wrist/Hand   Positive Phalen's sign.   Negative CMC grind, CMC lever test positive, TFCC load and Tinel's sign (medial nerve).       Flowsheet Rows      Flowsheet Row Most Recent Value   PT/OT G-Codes    Current Score 69   Projected Score 70               Precautions: PMH MI, HTN, DM, L RC repair  POC Expiration: 3/24/25      Manuals 1/29       Wrist stretching/mobs/ MREs        SWT R hand 10' atlas, 3.0 bar                       Neuro Re-Ed        Power web         Power web  and twist        Putty squeeze        Putty tip pinch        Putty pinch and pull        Digi         Flex bar twists        Flex bar sup/pro                        Ther Ex        UBE for ROM/ strength        Wrist flexion/ extension        Wrist UD/RD        Forearm sup/pro                                HEP instruction and education 5'       Ther Activity                        Gait Training                        Modalities        CP/MHP PRN

## 2025-01-31 ENCOUNTER — OFFICE VISIT (OUTPATIENT)
Dept: PHYSICAL THERAPY | Facility: CLINIC | Age: 70
End: 2025-01-31
Payer: COMMERCIAL

## 2025-01-31 ENCOUNTER — APPOINTMENT (OUTPATIENT)
Dept: PHYSICAL THERAPY | Facility: CLINIC | Age: 70
End: 2025-01-31
Payer: COMMERCIAL

## 2025-01-31 DIAGNOSIS — G56.01 CARPAL TUNNEL SYNDROME ON RIGHT: Primary | ICD-10-CM

## 2025-01-31 PROCEDURE — 97140 MANUAL THERAPY 1/> REGIONS: CPT

## 2025-01-31 PROCEDURE — 97112 NEUROMUSCULAR REEDUCATION: CPT

## 2025-01-31 PROCEDURE — 97110 THERAPEUTIC EXERCISES: CPT

## 2025-01-31 NOTE — PROGRESS NOTES
"Daily Note     Today's date: 2025  Patient name: Dawson Jane  : 1955  MRN: [id number]  Referring provider: Juan Merchant MD  Dx:   Encounter Diagnosis     ICD-10-CM    1. Carpal tunnel syndrome on right  G56.01           Start Time: 0900  Stop Time: 1000  Total time in clinic (min): 60 minutes    Subjective: Patient reports he was able to button the top button of his shirt yesterday which was an achievement for him.      Objective: See treatment diary below      Assessment: Tolerated treatment well. Patient tolerated shockwave treatment with mild recreation of numbness and tingling into his 1st digit but had no increase in pain. Patient demonstrated fatigue post treatment, exhibited good technique with therapeutic exercises, and would benefit from continued PT to improve R wrist flexibility and decompression of the median nerve.       Plan: Continue per plan of care.      Precautions: PMH MI, HTN, DM, L RC repair  POC Expiration: 3/24/25      Manuals       Wrist stretching/mobs/ MREs  10'      SWT R hand 10' atlas, 3.0 bar 5' atlas, 3.0 bar                      Neuro Re-Ed        Power web   Each finger 10x white      Power web  and twist        Putty squeeze        Putty tip pinch        Putty pinch and pull        Digi         Flex bar twists  2x10       Flex bar sup/pro                        Ther Ex        UBE for ROM/ strength  10' 1.0 Alt      Wrist flexion/ extension  2x10 GWB      Wrist UD/RD        Forearm sup/pro        Prayer stretch  5x15\" hold                       HEP instruction and education 5'       Ther Activity                        Gait Training                        Modalities        CP/MHP PRN 15' R wrist                    "

## 2025-02-05 ENCOUNTER — OFFICE VISIT (OUTPATIENT)
Dept: PHYSICAL THERAPY | Facility: CLINIC | Age: 70
End: 2025-02-05
Payer: COMMERCIAL

## 2025-02-05 DIAGNOSIS — G56.01 CARPAL TUNNEL SYNDROME ON RIGHT: Primary | ICD-10-CM

## 2025-02-05 DIAGNOSIS — M75.81 ROTATOR CUFF TENDONITIS, RIGHT: ICD-10-CM

## 2025-02-05 DIAGNOSIS — M54.2 POSTERIOR NECK PAIN: ICD-10-CM

## 2025-02-05 PROCEDURE — 97112 NEUROMUSCULAR REEDUCATION: CPT

## 2025-02-05 PROCEDURE — 97140 MANUAL THERAPY 1/> REGIONS: CPT

## 2025-02-05 PROCEDURE — 97110 THERAPEUTIC EXERCISES: CPT

## 2025-02-05 NOTE — PROGRESS NOTES
"Daily Note     Today's date: 2025  Patient name: Dawson Jane  : 1955  MRN: [id number]  Referring provider: Juan Merchant MD  Dx:   Encounter Diagnosis     ICD-10-CM    1. Carpal tunnel syndrome on right  G56.01       2. Rotator cuff tendonitis, right  M75.81       3. Posterior neck pain  M54.2                      Subjective: Patient reports that he is doing okay this morning.      Objective: See treatment diary below      Assessment: Tolerated treatment well. Patient exhibited good technique with therapeutic exercises and would benefit from continued PT to increase R hand ROM/strength and endurance to improve his functional mobility and coordination.      Plan: Continue per plan of care.      Precautions: PMH MI, HTN, DM, L RC repair  POC Expiration: 3/24/25      Manuals      Wrist stretching/mobs/ MREs  10' 10'      SWT R hand 10' atlas, 3.0 bar 5' atlas, 3.0 bar 5' Altlas 3.0bar                     Neuro Re-Ed        Power web   Each finger 10x white 10x     Power web  and twist   10x     Putty squeeze        Putty tip pinch        Putty pinch and pull        Digi         Flex bar twists  2x10  2x10     Flex bar sup/pro   10x                     Ther Ex        UBE for ROM/ strength  10' 1.0 Alt 10' 1.5alt     Wrist flexion/ extension  2x10 GWB 2x10 GWB     Wrist UD/RD   Hammer 2x10      Forearm sup/pro        Prayer stretch  5x15\" hold  5x15\"                     HEP instruction and education 5'       Ther Activity                        Gait Training                        Modalities        CP/MHP PRN 15' R wrist 15'Rwrist                   "

## 2025-02-06 NOTE — PROGRESS NOTES
"Daily Note     Today's date: 2025  Patient name: Dawson Jane  : 1955  MRN: [id number]  Referring provider: Juan Merchant MD  Dx:   Encounter Diagnosis     ICD-10-CM    1. Carpal tunnel syndrome on right  G56.01           Start Time: 0900  Stop Time: 1000  Total time in clinic (min): 60 minutes    Subjective: Patient reports he is noticing some improvements in his R wrist but still has some numbness and tingling with fine motor tasks.       Objective: See treatment diary below      Assessment: Tolerated treatment well. Patient tolerated progression in shockwave therapy but does not tolerate red tip with deeper penetration. Patient also continues to display hypomobility of R wrist with extension ROM deficits that improve some with manual therapy techniques. Patient demonstrated fatigue post treatment, exhibited good technique with therapeutic exercises, and would benefit from continued PT to improve R hand/wrist mobility and strength to reduce compression on median nerve and improve fine motor coordination.      Plan: Continue per plan of care.      Precautions: PMH MI, HTN, DM, L RC repair  POC Expiration: 3/24/25      Manuals     Wrist stretching/mobs/ MREs  10' 10'  10'     SWT R hand 10' atlas, 3.0 bar 5' atlas, 3.0 bar 5' Altlas 3.0bar 5' [place], 5.0 bar                    Neuro Re-Ed        Power web   Each finger 10x white 10x NT    Power web  and twist   10x NT    Putty squeeze        Putty tip pinch        Putty pinch and pull        Digi         Flex bar twists  2x10  2x10 2x10 ea     Flex bar sup/pro   10x 2x10 ea                     Ther Ex        UBE for ROM/ strength  10' 1.0 Alt 10' 1.5alt 10' 1.5 Alt    Wrist flexion/ extension  2x10 GWB 2x10 GWB 2x10 10#    Wrist UD/RD   Hammer 2x10  Hammer 2x10 1.5#    Forearm sup/pro    Hammer 2x10 1.5#    Prayer stretch  5x15\" hold  5x15\" 5x15\" hold                     HEP instruction and education 5'       Ther Activity "                        Gait Training                        Modalities        CP/MHP PRN 15' R wrist 15'Rwrist  R wrist 10'

## 2025-02-07 ENCOUNTER — OFFICE VISIT (OUTPATIENT)
Dept: PHYSICAL THERAPY | Facility: CLINIC | Age: 70
End: 2025-02-07
Payer: COMMERCIAL

## 2025-02-07 DIAGNOSIS — G56.01 CARPAL TUNNEL SYNDROME ON RIGHT: Primary | ICD-10-CM

## 2025-02-07 PROCEDURE — 97110 THERAPEUTIC EXERCISES: CPT

## 2025-02-07 PROCEDURE — 97140 MANUAL THERAPY 1/> REGIONS: CPT

## 2025-02-12 ENCOUNTER — OFFICE VISIT (OUTPATIENT)
Dept: PHYSICAL THERAPY | Facility: CLINIC | Age: 70
End: 2025-02-12
Payer: COMMERCIAL

## 2025-02-12 DIAGNOSIS — G56.01 CARPAL TUNNEL SYNDROME ON RIGHT: Primary | ICD-10-CM

## 2025-02-12 DIAGNOSIS — M54.2 POSTERIOR NECK PAIN: ICD-10-CM

## 2025-02-12 DIAGNOSIS — M75.81 ROTATOR CUFF TENDONITIS, RIGHT: ICD-10-CM

## 2025-02-12 PROCEDURE — 97110 THERAPEUTIC EXERCISES: CPT

## 2025-02-12 PROCEDURE — 97140 MANUAL THERAPY 1/> REGIONS: CPT

## 2025-02-12 PROCEDURE — 97112 NEUROMUSCULAR REEDUCATION: CPT

## 2025-02-12 NOTE — PROGRESS NOTES
"Daily Note     Today's date: 2025  Patient name: Dawson Jane  : 1955  MRN: [id number]  Referring provider: Juan Merchant MD  Dx:   Encounter Diagnosis     ICD-10-CM    1. Carpal tunnel syndrome on right  G56.01                      Subjective: Patient reports flare in symptoms possibly from driving long distance for vacation.       Objective: See treatment diary below      Assessment: Tolerated treatment well. Patient exhibited good technique with therapeutic exercises and would benefit from continued PT to increase R hand ROM/strength and endurance to improve his mobility/coordination.  Patient does note relief of symptoms post manual txs today.      Plan: Continue per plan of care.      Precautions: PMH MI, HTN, DM, L RC repair  POC Expiration: 3/24/25      Manuals    Wrist stretching/mobs/ MREs 10' 10'  10'  10' 15'STM   SWT R hand 5' atlas, 3.0 bar 5' Altlas 3.0bar 5' [place], 5.0 bar 5'[place], 5.0Bar NT                   Neuro Re-Ed        Power web  Each finger 10x white 10x NT     Power web  and twist  10x NT     Putty squeeze        Putty tip pinch        Putty pinch and pull        Digi         Flex bar twists 2x10  2x10 2x10 ea  2x10ea  DC   Flex bar sup/pro  10x 2x10 ea  2x10ea NT   William Push/Pull    17#10xea 17#2x10ea   William Single Arm Pull c Bar    10#10xea 10#2x10ea   Ther Ex        UBE for ROM/ strength 10' 1.0 Alt 10' 1.5alt 10' 1.5 Alt 10' 1.5alt NT   Wrist flexion/ extension 2x10 GWB 2x10 GWB 2x10 10# 10# 2x10 eccentric wrist flexion 2x10   Wrist UD/RD  Hammer 2x10  Hammer 2x10 1.5# Hammer 1.5#2x10 Hammer 1.5#2x10   Forearm sup/pro   Hammer 2x10 1.5# Hammer 1.5#2x10 Hammer 1.5#2x10   Prayer stretch 5x15\" hold  5x15\" 5x15\" hold  5x15\"         median nerve glide 10x5\"           HEP instruction and education        Ther Activity                        Gait Training                        Modalities        CP/MHP 15' R wrist 15'Rwrist MH R wrist 10' " 15'MH to Rwrist 15'MH to Rwrist

## 2025-02-12 NOTE — PROGRESS NOTES
"Daily Note     Today's date: 2025  Patient name: Dawson Jane  : 1955  MRN: [id number]  Referring provider: Juan Merchant MD  Dx:   Encounter Diagnosis     ICD-10-CM    1. Carpal tunnel syndrome on right  G56.01       2. Rotator cuff tendonitis, right  M75.81       3. Posterior neck pain  M54.2                      Subjective: Patient reports that his hand was bothering him yesterday.  \"I did the snow blowing over the weekend, so I don't know if that's why it bothered me.\"      Objective: See treatment diary below      Assessment: Tolerated treatment well. Patient exhibited good technique with therapeutic exercises and would benefit from continued PT to increase R hand ROM/strength and endurance to improve his mobility/coordination.      Plan: Continue per plan of care.      Precautions: PMH MI, HTN, DM, L RC repair  POC Expiration: 3/24/25      Manuals    Wrist stretching/mobs/ MREs  10' 10'  10'  10'   SWT R hand 10' atlas, 3.0 bar 5' atlas, 3.0 bar 5' Altlas 3.0bar 5' [place], 5.0 bar 5'[place], 5.0Bar                   Neuro Re-Ed        Power web   Each finger 10x white 10x NT    Power web  and twist   10x NT    Putty squeeze        Putty tip pinch        Putty pinch and pull        Digi         Flex bar twists  2x10  2x10 2x10 ea  2x10ea    Flex bar sup/pro   10x 2x10 ea  2x10ea   William Push/Pull     17#10xea   [place] Single Arm Pull c Bar     10#10xea   Ther Ex        UBE for ROM/ strength  10' 1.0 Alt 10' 1.5alt 10' 1.5 Alt 10' 1.5alt   Wrist flexion/ extension  2x10 GWB 2x10 GWB 2x10 10# 10# 2x10   Wrist UD/RD   Hammer 2x10  Hammer 2x10 1.5# Hammer 1.5#2x10   Forearm sup/pro    Hammer 2x10 1.5# Hammer 1.5#2x10   Prayer stretch  5x15\" hold  5x15\" 5x15\" hold  5x15\"                   HEP instruction and education 5'       Ther Activity                        Gait Training                        Modalities        CP/MHP PRN 15' R wrist 15'Rwrist  R wrist 10' " 15'[facility]

## 2025-02-14 ENCOUNTER — APPOINTMENT (OUTPATIENT)
Dept: PHYSICAL THERAPY | Facility: CLINIC | Age: 70
End: 2025-02-14
Payer: COMMERCIAL

## 2025-02-19 ENCOUNTER — OFFICE VISIT (OUTPATIENT)
Dept: PHYSICAL THERAPY | Facility: CLINIC | Age: 70
End: 2025-02-19
Payer: COMMERCIAL

## 2025-02-19 DIAGNOSIS — G56.01 CARPAL TUNNEL SYNDROME ON RIGHT: Primary | ICD-10-CM

## 2025-02-19 PROCEDURE — 97140 MANUAL THERAPY 1/> REGIONS: CPT

## 2025-02-19 PROCEDURE — 97110 THERAPEUTIC EXERCISES: CPT

## 2025-02-19 PROCEDURE — 97112 NEUROMUSCULAR REEDUCATION: CPT

## 2025-02-21 ENCOUNTER — OFFICE VISIT (OUTPATIENT)
Dept: PHYSICAL THERAPY | Facility: CLINIC | Age: 70
End: 2025-02-21
Payer: COMMERCIAL

## 2025-02-21 DIAGNOSIS — M75.81 ROTATOR CUFF TENDONITIS, RIGHT: ICD-10-CM

## 2025-02-21 DIAGNOSIS — M54.2 POSTERIOR NECK PAIN: ICD-10-CM

## 2025-02-21 DIAGNOSIS — G56.01 CARPAL TUNNEL SYNDROME ON RIGHT: Primary | ICD-10-CM

## 2025-02-21 PROCEDURE — 97110 THERAPEUTIC EXERCISES: CPT

## 2025-02-21 PROCEDURE — 97140 MANUAL THERAPY 1/> REGIONS: CPT

## 2025-02-21 PROCEDURE — 97112 NEUROMUSCULAR REEDUCATION: CPT

## 2025-02-21 NOTE — PROGRESS NOTES
"Daily Note     Today's date: 2025  Patient name: Dawson Jane  : 1955  MRN: [id number]  Referring provider: Juan Merchant MD  Dx:   Encounter Diagnosis     ICD-10-CM    1. Carpal tunnel syndrome on right  G56.01       2. Rotator cuff tendonitis, right  M75.81       3. Posterior neck pain  M54.2                      Subjective: Patient reports that he feels symptoms while folding laundry.  \"I've been having this pain for a long time.\"      Objective: See treatment diary below      Assessment: Tolerated treatment well. Patient exhibited good technique with therapeutic exercises and would benefit from continued PT to increase R hand ROM/strength and endurance to improve his mobility and coordination.  Soft tissue restriction in thenar eminence region responded well to DTM c some relief post manual txs.  Recommended patient to keep up with his at home stretches to improve discomfort.      Plan: Continue per plan of care.      Precautions: PMH MI, HTN, DM, L RC repair  POC Expiration: 3/24/25      Manuals 2/   Wrist stretching/mobs/ MREs 10'  10'  10' 15'STM 15' c DTM to thenar eminence    SWT R hand 5' [place] 3.0bar 5' [place], 5.0 bar 5'[place], 5.0Bar NT                    Neuro Re-Ed        Power web  10x NT      Power web  and twist 10x NT      Putty squeeze        Putty tip pinch        Putty pinch and pull        Digi         Flex bar twists 2x10 2x10 ea  2x10ea  DC    Flex bar sup/pro 10x 2x10 ea  2x10ea NT    [place] Push/Pull   17#10xea 17#2x10ea 19#2x10ea   [place] Single Arm Pull c Bar   10#10xea 10#2x10ea 11#2x10ea   Ther Ex        UBE for ROM/ strength 10' 1.5alt 10' 1.5 Alt 10' 1.5alt NT 10' 1.5alt   Wrist flexion/ extension 2x10 GWB 2x10 10# 10# 2x10 eccentric wrist flexion 2x10 Eccen Wrist Flexion 2x10 YTB   Wrist UD/RD Hammer 2x10  Hammer 2x10 1.5# Hammer 1.5#2x10 Hammer 1.5#2x10 NT   Forearm sup/pro  Hammer 2x10 1.5# Hammer 1.5#2x10 Hammer 1.5#2x10 NT   [place] " "stretch 5x15\" 5x15\" hold  5x15\"         median nerve glide 10x5\"            HEP instruction and education        Ther Activity                        Gait Training                        Modalities        CP/MHP 15'Rwrist MH R wrist 10' 15'MH to Rwrist 15'MH to Rwrist 15'MH to Rwrist & R shld                     "

## 2025-02-26 ENCOUNTER — APPOINTMENT (OUTPATIENT)
Dept: PHYSICAL THERAPY | Facility: CLINIC | Age: 70
End: 2025-02-26
Payer: COMMERCIAL

## 2025-02-28 ENCOUNTER — APPOINTMENT (OUTPATIENT)
Dept: PHYSICAL THERAPY | Facility: CLINIC | Age: 70
End: 2025-02-28
Payer: COMMERCIAL

## 2025-03-08 DIAGNOSIS — E78.00 HIGH CHOLESTEROL: ICD-10-CM

## 2025-03-08 DIAGNOSIS — F41.9 ANXIETY: ICD-10-CM

## 2025-03-08 DIAGNOSIS — I10 ESSENTIAL HYPERTENSION: ICD-10-CM

## 2025-03-10 RX ORDER — ATORVASTATIN CALCIUM 80 MG/1
80 TABLET, FILM COATED ORAL DAILY
Qty: 90 TABLET | Refills: 1 | Status: SHIPPED | OUTPATIENT
Start: 2025-03-10

## 2025-03-10 RX ORDER — CITALOPRAM HYDROBROMIDE 10 MG/1
10 TABLET ORAL DAILY
Qty: 90 TABLET | Refills: 1 | Status: SHIPPED | OUTPATIENT
Start: 2025-03-10

## 2025-03-10 RX ORDER — HYDROCHLOROTHIAZIDE 25 MG/1
25 TABLET ORAL DAILY
Qty: 90 TABLET | Refills: 0 | Status: SHIPPED | OUTPATIENT
Start: 2025-03-10 | End: 2025-03-24 | Stop reason: SDUPTHER

## 2025-03-24 ENCOUNTER — CONSULT (OUTPATIENT)
Dept: FAMILY MEDICINE CLINIC | Facility: CLINIC | Age: 70
End: 2025-03-24
Payer: COMMERCIAL

## 2025-03-24 VITALS
BODY MASS INDEX: 32.33 KG/M2 | HEART RATE: 86 BPM | HEIGHT: 67 IN | DIASTOLIC BLOOD PRESSURE: 82 MMHG | SYSTOLIC BLOOD PRESSURE: 136 MMHG | WEIGHT: 206 LBS | OXYGEN SATURATION: 98 %

## 2025-03-24 DIAGNOSIS — I10 PRIMARY HYPERTENSION: ICD-10-CM

## 2025-03-24 DIAGNOSIS — I10 ESSENTIAL HYPERTENSION: ICD-10-CM

## 2025-03-24 DIAGNOSIS — E11.9 TYPE 2 DIABETES MELLITUS WITHOUT COMPLICATION, WITHOUT LONG-TERM CURRENT USE OF INSULIN (HCC): Primary | ICD-10-CM

## 2025-03-24 DIAGNOSIS — E11.00 TYPE 2 DIABETES MELLITUS WITH HYPEROSMOLARITY WITHOUT COMA, WITHOUT LONG-TERM CURRENT USE OF INSULIN (HCC): ICD-10-CM

## 2025-03-24 LAB
CREAT UR-MCNC: 43.5 MG/DL
MICROALBUMIN UR-MCNC: 31.8 MG/L
MICROALBUMIN/CREAT 24H UR: 73 MG/G CREATININE (ref 0–30)
SL AMB POCT HEMOGLOBIN AIC: 8 (ref ?–6.5)

## 2025-03-24 PROCEDURE — 82043 UR ALBUMIN QUANTITATIVE: CPT | Performed by: NURSE PRACTITIONER

## 2025-03-24 PROCEDURE — 82570 ASSAY OF URINE CREATININE: CPT | Performed by: NURSE PRACTITIONER

## 2025-03-24 PROCEDURE — 99242 OFF/OP CONSLTJ NEW/EST SF 20: CPT | Performed by: NURSE PRACTITIONER

## 2025-03-24 PROCEDURE — 83036 HEMOGLOBIN GLYCOSYLATED A1C: CPT | Performed by: NURSE PRACTITIONER

## 2025-03-24 RX ORDER — HYDROCHLOROTHIAZIDE 25 MG/1
25 TABLET ORAL DAILY
Qty: 90 TABLET | Refills: 2 | Status: SHIPPED | OUTPATIENT
Start: 2025-03-24

## 2025-03-24 RX ORDER — SEMAGLUTIDE 1.34 MG/ML
1 INJECTION, SOLUTION SUBCUTANEOUS
Qty: 9 ML | Refills: 2 | Status: SHIPPED | OUTPATIENT
Start: 2025-03-24

## 2025-03-24 RX ORDER — CARVEDILOL 25 MG/1
25 TABLET ORAL 2 TIMES DAILY WITH MEALS
Qty: 180 TABLET | Refills: 2 | Status: SHIPPED | OUTPATIENT
Start: 2025-03-24

## 2025-03-24 NOTE — PROGRESS NOTES
Name: Dawson Jane      : 1955      MRN: [id number]  Encounter Provider: ISIDRO Hendricks  Encounter Date: 3/24/2025   Encounter department: [facility]  :  Assessment & Plan  Type 2 diabetes mellitus without complication, without long-term current use of insulin (HCC)    Lab Results   Component Value Date    HGBA1C 8.0 (A) 2025       Orders:  •  Basic metabolic panel; Future  •  POCT hemoglobin A1c  •  Albumin / creatinine urine ratio; Future    Primary hypertension         Essential hypertension    Orders:  •  carvedilol (COREG) 25 mg tablet; Take 1 tablet (25 mg total) by mouth 2 (two) times a day with meals  •  hydroCHLOROthiazide 25 mg tablet; Take 1 tablet (25 mg total) by mouth daily    Type 2 diabetes mellitus with hyperosmolarity without coma, without long-term current use of insulin (HCC)    Lab Results   Component Value Date    HGBA1C 8.0 (A) 2025       Orders:  •  Ozempic, 1 MG/DOSE, 4 MG/3ML injection pen; Inject 0.75 mL (1 mg total) under the skin every 7 days           History of Present Illness   Assessment of Chronic Conditions:  Patient Active Problem List:     Hypertension     Coronary artery disease     Diabetes mellitus (HCC)     High cholesterol     Gastroesophageal reflux disease without esophagitis     Anxiety     White coat syndrome with diagnosis of hypertension     Other hemorrhoids     ALETA (obstructive sleep apnea)     Moderate obstructive sleep apnea         Assessment of Cardiac Risk:  The 10-year ASCVD risk score (Corbin WANG, et al., 2019) is: 29.5%    Values used to calculate the score:      Age: 70 years      Sex: Male      Is Non- : No      Diabetic: Yes      Tobacco smoker: No      Systolic Blood Pressure: 136 mmHg      Is BP treated: Yes      HDL Cholesterol: 46 mg/dL      Total Cholesterol: 131 mg/dL      Renal:  (2024) GFR: 90    Prior Anesthesia Reactions:  None      Anticoagulant  "Use?  No      Medically Stable to proceed with surgery?    Yes  Meds to take AM of surgery:   Eye drops      Review of Systems   Constitutional: Negative.    Respiratory: Negative.     Cardiovascular: Negative.    Neurological: Negative.    All other systems reviewed and are negative.      Objective   /82 (BP Location: Right arm, Patient Position: Sitting, Cuff Size: Large)   Pulse 86   Ht 5' 7\" (1.702 m)   Wt 93.4 kg (206 lb)   SpO2 98%   BMI 32.26 kg/m²      Physical Exam  Constitutional:       Appearance: Normal appearance.   Cardiovascular:      Rate and Rhythm: Normal rate and regular rhythm.   Pulmonary:      Effort: Pulmonary effort is normal.      Breath sounds: Normal breath sounds.   Neurological:      Mental Status: He is alert and oriented to person, place, and time.           "

## 2025-03-24 NOTE — ASSESSMENT & PLAN NOTE
Lab Results   Component Value Date    HGBA1C 8.0 (A) 03/24/2025       Orders:  •  Ozempic, 1 MG/DOSE, 4 MG/3ML injection pen; Inject 0.75 mL (1 mg total) under the skin every 7 days

## 2025-03-24 NOTE — ASSESSMENT & PLAN NOTE
Lab Results   Component Value Date    HGBA1C 8.0 (A) 03/24/2025       Orders:  •  Basic metabolic panel; Future  •  POCT hemoglobin A1c  •  Albumin / creatinine urine ratio; Future

## 2025-03-31 ENCOUNTER — APPOINTMENT (OUTPATIENT)
Dept: LAB | Facility: HOSPITAL | Age: 70
End: 2025-03-31
Payer: COMMERCIAL

## 2025-03-31 DIAGNOSIS — E11.9 TYPE 2 DIABETES MELLITUS WITHOUT COMPLICATION, WITHOUT LONG-TERM CURRENT USE OF INSULIN (HCC): ICD-10-CM

## 2025-03-31 LAB
ANION GAP SERPL CALCULATED.3IONS-SCNC: 6 MMOL/L (ref 4–13)
BUN SERPL-MCNC: 15 MG/DL (ref 5–25)
CALCIUM SERPL-MCNC: 9.5 MG/DL (ref 8.4–10.2)
CHLORIDE SERPL-SCNC: 104 MMOL/L (ref 96–108)
CO2 SERPL-SCNC: 30 MMOL/L (ref 21–32)
CREAT SERPL-MCNC: 0.85 MG/DL (ref 0.6–1.3)
GFR SERPL CREATININE-BSD FRML MDRD: 89 ML/MIN/1.73SQ M
GLUCOSE P FAST SERPL-MCNC: 169 MG/DL (ref 65–99)
POTASSIUM SERPL-SCNC: 4.1 MMOL/L (ref 3.5–5.3)
SODIUM SERPL-SCNC: 140 MMOL/L (ref 135–147)

## 2025-03-31 PROCEDURE — 36415 COLL VENOUS BLD VENIPUNCTURE: CPT

## 2025-03-31 PROCEDURE — 80048 BASIC METABOLIC PNL TOTAL CA: CPT

## 2025-04-13 DIAGNOSIS — E11.00 TYPE 2 DIABETES MELLITUS WITH HYPEROSMOLARITY WITHOUT COMA, WITHOUT LONG-TERM CURRENT USE OF INSULIN (HCC): ICD-10-CM

## 2025-04-14 RX ORDER — RAMIPRIL 10 MG/1
10 CAPSULE ORAL DAILY
Qty: 90 CAPSULE | Refills: 1 | Status: SHIPPED | OUTPATIENT
Start: 2025-04-14

## 2025-04-14 RX ORDER — DAPAGLIFLOZIN 10 MG/1
10 TABLET, FILM COATED ORAL DAILY
Qty: 90 TABLET | Refills: 1 | Status: SHIPPED | OUTPATIENT
Start: 2025-04-14

## 2025-04-15 ENCOUNTER — ANESTHESIA EVENT (OUTPATIENT)
Dept: PERIOP | Facility: HOSPITAL | Age: 70
End: 2025-04-15
Payer: COMMERCIAL

## 2025-04-15 ENCOUNTER — HOSPITAL ENCOUNTER (OUTPATIENT)
Dept: PERIOP | Facility: HOSPITAL | Age: 70
Setting detail: OUTPATIENT SURGERY
Discharge: HOME | End: 2025-04-15
Attending: STUDENT IN AN ORGANIZED HEALTH CARE EDUCATION/TRAINING PROGRAM
Payer: COMMERCIAL

## 2025-04-15 ENCOUNTER — ANESTHESIA (OUTPATIENT)
Dept: PERIOP | Facility: HOSPITAL | Age: 70
End: 2025-04-15
Payer: COMMERCIAL

## 2025-04-15 VITALS
SYSTOLIC BLOOD PRESSURE: 122 MMHG | OXYGEN SATURATION: 99 % | DIASTOLIC BLOOD PRESSURE: 73 MMHG | WEIGHT: 206 LBS | TEMPERATURE: 97.2 F | BODY MASS INDEX: 32.33 KG/M2 | HEART RATE: 75 BPM | HEIGHT: 67 IN | RESPIRATION RATE: 20 BRPM

## 2025-04-15 DIAGNOSIS — Z86.0100 HISTORY OF COLON POLYPS: ICD-10-CM

## 2025-04-15 LAB — GLUCOSE SERPL-MCNC: 155 MG/DL (ref 65–140)

## 2025-04-15 PROCEDURE — 82948 REAGENT STRIP/BLOOD GLUCOSE: CPT

## 2025-04-15 PROCEDURE — 88305 TISSUE EXAM BY PATHOLOGIST: CPT | Performed by: PATHOLOGY

## 2025-04-15 PROCEDURE — 45385 COLONOSCOPY W/LESION REMOVAL: CPT | Performed by: STUDENT IN AN ORGANIZED HEALTH CARE EDUCATION/TRAINING PROGRAM

## 2025-04-15 RX ORDER — SODIUM CHLORIDE, SODIUM LACTATE, POTASSIUM CHLORIDE, CALCIUM CHLORIDE 600; 310; 30; 20 MG/100ML; MG/100ML; MG/100ML; MG/100ML
125 INJECTION, SOLUTION INTRAVENOUS CONTINUOUS
Status: DISCONTINUED | OUTPATIENT
Start: 2025-04-15 | End: 2025-04-19 | Stop reason: HOSPADM

## 2025-04-15 RX ORDER — PROPOFOL 10 MG/ML
INJECTION, EMULSION INTRAVENOUS AS NEEDED
Status: DISCONTINUED | OUTPATIENT
Start: 2025-04-15 | End: 2025-04-15

## 2025-04-15 RX ORDER — ONDANSETRON 2 MG/ML
4 INJECTION INTRAMUSCULAR; INTRAVENOUS ONCE AS NEEDED
Status: DISCONTINUED | OUTPATIENT
Start: 2025-04-15 | End: 2025-04-19 | Stop reason: HOSPADM

## 2025-04-15 RX ORDER — LIDOCAINE HYDROCHLORIDE 10 MG/ML
INJECTION, SOLUTION EPIDURAL; INFILTRATION; INTRACAUDAL; PERINEURAL AS NEEDED
Status: DISCONTINUED | OUTPATIENT
Start: 2025-04-15 | End: 2025-04-15

## 2025-04-15 RX ORDER — SODIUM CHLORIDE, SODIUM LACTATE, POTASSIUM CHLORIDE, CALCIUM CHLORIDE 600; 310; 30; 20 MG/100ML; MG/100ML; MG/100ML; MG/100ML
125 INJECTION, SOLUTION INTRAVENOUS CONTINUOUS
Status: CANCELLED | OUTPATIENT
Start: 2025-04-15

## 2025-04-15 RX ADMIN — SODIUM CHLORIDE, SODIUM LACTATE, POTASSIUM CHLORIDE, AND CALCIUM CHLORIDE 125 ML/HR: .6; .31; .03; .02 INJECTION, SOLUTION INTRAVENOUS at 07:23

## 2025-04-15 RX ADMIN — PROPOFOL 70 MG: 10 INJECTION, EMULSION INTRAVENOUS at 08:22

## 2025-04-15 RX ADMIN — PROPOFOL 140 MCG/KG/MIN: 10 INJECTION, EMULSION INTRAVENOUS at 08:23

## 2025-04-15 RX ADMIN — PROPOFOL 70 MG: 10 INJECTION, EMULSION INTRAVENOUS at 08:25

## 2025-04-15 RX ADMIN — LIDOCAINE HYDROCHLORIDE 50 MG: 10 INJECTION, SOLUTION EPIDURAL; INFILTRATION; INTRACAUDAL; PERINEURAL at 08:22

## 2025-04-15 RX ADMIN — PROPOFOL 50 MG: 10 INJECTION, EMULSION INTRAVENOUS at 08:37

## 2025-04-15 NOTE — ANESTHESIA PREPROCEDURE EVALUATION
Normal EF    Procedure:  COLONOSCOPY    Relevant Problems   ANESTHESIA (within normal limits)      CARDIO   (+) Coronary artery disease   (+) High cholesterol   (+) Hypertension   (+) Other hemorrhoids   (+) White coat syndrome with diagnosis of hypertension      GI/HEPATIC   (+) Gastroesophageal reflux disease without esophagitis      NEURO/PSYCH   (+) Anxiety      PULMONARY   (+) Moderate obstructive sleep apnea   (+) ALETA (obstructive sleep apnea)        Physical Exam    Airway    Mallampati score: II  TM Distance: >3 FB  Neck ROM: full     Dental       Cardiovascular  Rate: normal    Pulmonary  Pulmonary exam normal     Other Findings  Per pt denies anything remaining that is loose or removeable      Anesthesia Plan  ASA Score- 3     Anesthesia Type- IV sedation with anesthesia with ASA Monitors.         Additional Monitors:     Airway Plan:            Plan Factors-Exercise tolerance (METS): >4 METS.    Chart reviewed.    Patient summary reviewed.    Patient is not a current smoker.              Induction- intravenous.    Postoperative Plan-         Informed Consent- Anesthetic plan and risks discussed with patient.  I personally reviewed this patient with the CRNA. Discussed and agreed on the Anesthesia Plan with the CRNA..      NPO Status:  Vitals Value Taken Time   Date of last liquid 04/15/25 04/15/25 0702   Time of last liquid 0200 04/15/25 0702   Date of last solid 04/13/25 04/15/25 0702   Time of last solid 2100 04/15/25 0702

## 2025-04-15 NOTE — H&P
[facility]  History & Physical     PATIENT INFO     Name: Dawson Jane  YOB: 1955   Age: 70 y.o.   Sex: male   MRN: [id number]     HISTORY OF PRESENT ILLNESS     Dawson Jane is a 70 y.o. year old male who presents for colonoscopy for history of colon polyps. Last colonoscopy in . No antithrombotics or anticoagulants.     REVIEW OF SYSTEMS     Per the HPI, and otherwise unremarkable.    Historical Information   Past Medical History:   Diagnosis Date    Allergic Seasonal    Anesthesia     Pt reports that he became agitated after IV sedation for a colonoscopy    Coronary artery disease 2014    COVID-19 2021    Pt reports fever, fatigue, nausea and weakness. Pt denies breathing issues. Symptoms lasted 2 weeks.    Diabetes mellitus (HCC)     GERD (gastroesophageal reflux disease)     Hard to intubate     Pt received a letter stating that it was hard to visualize vocal chords    Heart attack (HCC)     High cholesterol     Hypertension     Myocardial infarction (HCC) 2014    Stent    Wears glasses      Past Surgical History:   Procedure Laterality Date    CARDIAC CATHETERIZATION  2014    Pt reports having one stent placed.    CATARACT EXTRACTION      CHOLECYSTECTOMY      COLONOSCOPY      [place] SURGICAL ARTHROSCOPY SHOULDER XTNSV DBRDMT 3+ Left 2021    Procedure: ARTHROSCOPY SHOULDER , debridement; open rotator cuff repair;  Surgeon: Wayne Peace;  Location:  MAIN OR;  Service: Orthopedics    VASECTOMY      VASECTOMY REVERSAL       Social History   Social History     Substance and Sexual Activity   Alcohol Use Yes    Alcohol/week: 5.0 standard drinks of alcohol    Types: 5 Cans of beer per week     Social History     Substance and Sexual Activity   Drug Use Never     Social History     Tobacco Use   Smoking Status Former    Current packs/day: 0.00    Types: Cigarettes    Quit date: 1985    Years since quittin.1    Passive exposure: Past  "  Smokeless Tobacco Never     Family History   Problem Relation Age of Onset    Dementia Mother     No Known Problems Father         MEDICATIONS & ALLERGIES     Current Outpatient Medications   Medication Instructions    aspirin (ECOTRIN LOW STRENGTH) 81 mg, Every morning    atorvastatin (LIPITOR) 80 mg, Oral, Daily    carvedilol (COREG) 25 mg, Oral, 2 times daily with meals    citalopram (CELEXA) 10 mg, Oral, Daily    CoQ-10 100 mg, Daily    dapagliflozin (FARXIGA) 10 mg, Oral, Daily    FIBER ADULT GUMMIES PO Take by mouth    hydroCHLOROthiazide 25 mg, Oral, Daily    hydrocortisone (ANUSOL-HC) 25 mg, Rectal, 2 times daily PRN    Multiple Vitamins-Minerals (PreserVision AREDS 2) CAPS 1 capsule, Daily    NexIUM 5 mg, Oral, Daily    ofloxacin (OCUFLOX) 0.3 % ophthalmic solution     Ozempic (1 MG/DOSE) 1 mg, Subcutaneous, Every 7 days    prednisoLONE acetate (PRED FORTE) 1 % ophthalmic suspension PUT 1 DROP EVERY 2 HOURS INTO RIGHT EYE TO BE STARTED AFTER SURGERY IS COMPLETED    ramipril (ALTACE) 10 mg, Oral, Daily     Allergies   Allergen Reactions    Food Allergy [Soybean Oil - Food Allergy] Anaphylaxis     Organic soy milk. Pt reports having allergy shots at that time.        PHYSICAL EXAM      Objective   Blood pressure 140/76, pulse 88, temperature (!) 97 °F (36.1 °C), temperature source Temporal, resp. rate 18, height 5' 7\" (1.702 m), weight 93.4 kg (206 lb), SpO2 96%. Body mass index is 32.26 kg/m².    General Appearance:   Alert, cooperative, no distress   Lungs:   Equal chest rise, respirations unlabored    Heart:   Regular rate and rhythm   Abdomen:   Soft, non-tender, non-distended; normal bowel sounds; no masses, no organomegaly    Extremities:   No edema       ASSESSMENT & PLAN     This is a 70 y.o. year old male here for colonoscopy, and he is stable and optimized for his procedure.      Josef Pradhan D.O.  [facility]  Division of Gastroenterology & Hepatology  Available on " TigerText  [email or web address]    ** Please Note: This note is constructed using a voice recognition dictation system. **

## 2025-04-15 NOTE — ANESTHESIA POSTPROCEDURE EVALUATION
Post-Op Assessment Note    CV Status:  Stable    Pain management: adequate       Mental Status:  Sleepy   Hydration Status:  Euvolemic   PONV Controlled:  Controlled   Airway Patency:  Patent     Post Op Vitals Reviewed: Yes    No anethesia notable event occurred.    Staff: CRNA           Last Filed PACU Vitals:  Vitals Value Taken Time   Temp 97    Pulse 75 04/15/25 0846   /57 04/15/25 0845   Resp 11 04/15/25 0846   SpO2 99 % 04/15/25 0846   Vitals shown include unfiled device data.

## 2025-04-18 ENCOUNTER — APPOINTMENT (OUTPATIENT)
Dept: LAB | Facility: HOSPITAL | Age: 70
End: 2025-04-18

## 2025-04-18 DIAGNOSIS — Z00.8 ENCOUNTER FOR OTHER GENERAL EXAMINATION: ICD-10-CM

## 2025-04-18 LAB
CHOLEST SERPL-MCNC: 142 MG/DL (ref ?–200)
EST. AVERAGE GLUCOSE BLD GHB EST-MCNC: 194 MG/DL
HBA1C MFR BLD: 8.4 %
HDLC SERPL-MCNC: 49 MG/DL
LDLC SERPL CALC-MCNC: 75 MG/DL (ref 0–100)
NONHDLC SERPL-MCNC: 93 MG/DL
TRIGL SERPL-MCNC: 90 MG/DL (ref ?–150)

## 2025-04-18 PROCEDURE — 80061 LIPID PANEL: CPT

## 2025-04-18 PROCEDURE — 36415 COLL VENOUS BLD VENIPUNCTURE: CPT

## 2025-04-18 PROCEDURE — 83036 HEMOGLOBIN GLYCOSYLATED A1C: CPT

## 2025-04-18 PROCEDURE — 88305 TISSUE EXAM BY PATHOLOGIST: CPT | Performed by: PATHOLOGY

## 2025-04-22 ENCOUNTER — TELEPHONE (OUTPATIENT)
Age: 70
End: 2025-04-22

## 2025-04-22 ENCOUNTER — RESULTS FOLLOW-UP (OUTPATIENT)
Age: 70
End: 2025-04-22

## 2025-04-22 NOTE — TELEPHONE ENCOUNTER
Gracie from [facility] called and stated the  patient is having a second cataract surgery on 4/24/25 and she is sending us an extension of medical clearance to fill out. His last surgical clearance from the other eye was on 3/24/25.

## 2025-04-23 NOTE — TELEPHONE ENCOUNTER
Gracie from [facility] called to confirm if patients medical clearance form had been received and signed. I advised Gracie the form had been received however I did not see completion of the form at this time. Gracie stated she will need the form by end of day due to patients surgery being tomorrow and they will need the form in order to do the surgery. Gracie is asking if this can be completed as soon as possible. Please advise.

## 2025-05-02 ENCOUNTER — HOSPITAL ENCOUNTER (OUTPATIENT)
Dept: RADIOLOGY | Facility: CLINIC | Age: 70
End: 2025-05-02
Attending: STUDENT IN AN ORGANIZED HEALTH CARE EDUCATION/TRAINING PROGRAM
Payer: COMMERCIAL

## 2025-05-02 ENCOUNTER — OFFICE VISIT (OUTPATIENT)
Dept: OBGYN CLINIC | Facility: CLINIC | Age: 70
End: 2025-05-02
Payer: COMMERCIAL

## 2025-05-02 DIAGNOSIS — M25.532 PAIN IN LEFT WRIST: ICD-10-CM

## 2025-05-02 DIAGNOSIS — M65.4 DE QUERVAIN'S DISEASE (TENOSYNOVITIS): Primary | ICD-10-CM

## 2025-05-02 PROCEDURE — 73110 X-RAY EXAM OF WRIST: CPT

## 2025-05-02 PROCEDURE — 20550 NJX 1 TENDON SHEATH/LIGAMENT: CPT | Performed by: STUDENT IN AN ORGANIZED HEALTH CARE EDUCATION/TRAINING PROGRAM

## 2025-05-02 PROCEDURE — 99213 OFFICE O/P EST LOW 20 MIN: CPT | Performed by: STUDENT IN AN ORGANIZED HEALTH CARE EDUCATION/TRAINING PROGRAM

## 2025-05-02 RX ADMIN — BETAMETHASONE SODIUM PHOSPHATE AND BETAMETHASONE ACETATE 6 MG: 3; 3 INJECTION, SUSPENSION INTRA-ARTICULAR; INTRALESIONAL; INTRAMUSCULAR; SOFT TISSUE at 09:15

## 2025-05-02 RX ADMIN — LIDOCAINE HYDROCHLORIDE 1 ML: 10 INJECTION, SOLUTION INFILTRATION; PERINEURAL at 09:15

## 2025-05-02 NOTE — PROGRESS NOTES
ASSESSMENT/PLAN:    Assessment & Plan  De Quervain's disease (tenosynovitis)  70 y.o. male presents with signs and symptoms consistent with the above diagnosis.  We discussed the natural history of this condition and its pathogenesis.  We discussed operative and nonoperative treatment options.  The anatomy and physiology of de Quervain's tenosynovitis was discussed with the patient today in the office.  Edema and increased contact pressure within the first dorsal extensor compartment at the radial styloid can cause pain, crepitation, and limitation of function.  Treatment options include resting thumb spica splints to decrease edema, oral anti-inflammatory medications, home or formal therapy exercises, up to 2 steroid injections within the first dorsal extensor compartment, or surgical release.  While majority of patients do respond to conservative treatment, up to 20% may require surgical release.   Left wrist DeQuervain's tenosynovitis.   - After a thorough discussion of risks, benefits, and alternatives, patient is indicated for non-operative treatment.  - Cortisone injection is given today.   - Patient was fitted for a thumb spica splint which should be worn at all times for the next two  weeks. Following two weeks, patient will wean out of splint and begin occupation therapy.    - Follow up in 6 weeks if symptoms have not completely resolved. Patient may require surgical intervention if the symptoms persist.   - Patient/Guardian agrees and consents to the treatment plan.  All questions answered.   He expressed understanding of the plan and agreed. We encouraged them to contact our office with any questions or concerns.     Orders:    XR wrist 3+ vw left; Future    Durable Medical Equipment         1. De Quervain's disease (tenosynovitis)  XR wrist 3+ vw left    Durable Medical Equipment        _____________________________________________________  CHIEF COMPLAINT:  Left Wrist apin    Referred By:  No referring  provider defined for this encounter.    SUBJECTIVE:  Dawson Jane is a 70 y.o. right hand dominant male presenting for evaluation of left wrist pain. Notes pain over radial wrist and swelling. He has pain when reaching into his pocked or when grabbing and lifting his laptop. Here to establish care. Denies numbness or tingling.    ADLs: Community ambulator  Smoke: No   ETOH: Yes   Drugs:  No  Job: Unknown         PAST MEDICAL HISTORY:  Past Medical History:   Diagnosis Date    Allergic Seasonal    Anesthesia     Pt reports that he became agitated after IV sedation for a colonoscopy    Coronary artery disease 2014    COVID-19 2021    Pt reports fever, fatigue, nausea and weakness. Pt denies breathing issues. Symptoms lasted 2 weeks.    Diabetes mellitus (HCC)     GERD (gastroesophageal reflux disease)     Hard to intubate     Pt received a letter stating that it was hard to visualize vocal chords    Heart attack (HCC)     High cholesterol     Hypertension     Myocardial infarction (HCC) 2014    Stent    Wears glasses        PAST SURGICAL HISTORY:  Past Surgical History:   Procedure Laterality Date    CARDIAC CATHETERIZATION  2014    Pt reports having one stent placed.    CATARACT EXTRACTION      CHOLECYSTECTOMY      COLONOSCOPY      [place] SURGICAL ARTHROSCOPY SHOULDER XTNSV DBRDMT 3+ Left 2021    Procedure: ARTHROSCOPY SHOULDER , debridement; open rotator cuff repair;  Surgeon: Wayne Peace;  Location:  MAIN OR;  Service: Orthopedics    VASECTOMY      VASECTOMY REVERSAL         FAMILY HISTORY:  Family History   Problem Relation Age of Onset    Dementia Mother     No Known Problems Father        SOCIAL HISTORY:  Social History     Tobacco Use    Smoking status: Former     Current packs/day: 0.00     Types: Cigarettes     Quit date: 1985     Years since quittin.2     Passive exposure: Past    Smokeless tobacco: Never   Vaping Use    Vaping status: Never Used   Substance Use Topics     Alcohol use: Yes     Alcohol/week: 5.0 standard drinks of alcohol     Types: 5 Cans of beer per week    Drug use: Never       MEDICATIONS:    Current Outpatient Medications:     aspirin (ECOTRIN LOW STRENGTH) 81 mg EC tablet, Take 81 mg by mouth every morning, Disp: , Rfl:     atorvastatin (LIPITOR) 80 mg tablet, Take 1 tablet (80 mg total) by mouth daily, Disp: 90 tablet, Rfl: 1    carvedilol (COREG) 25 mg tablet, Take 1 tablet (25 mg total) by mouth 2 (two) times a day with meals, Disp: 180 tablet, Rfl: 2    citalopram (CeleXA) 10 mg tablet, Take 1 tablet (10 mg total) by mouth daily, Disp: 90 tablet, Rfl: 1    Coenzyme Q10 (CoQ-10) 100 MG CAPS, Take 100 mg by mouth daily , Disp: , Rfl:     dapagliflozin (Farxiga) 10 MG tablet, Take 1 tablet (10 mg total) by mouth daily, Disp: 90 tablet, Rfl: 1    esomeprazole (NexIUM) 5 MG packet, Take 5 mg by mouth daily, Disp: 90 each, Rfl: 1    FIBER ADULT GUMMIES PO, Take by mouth, Disp: , Rfl:     hydroCHLOROthiazide 25 mg tablet, Take 1 tablet (25 mg total) by mouth daily, Disp: 90 tablet, Rfl: 2    hydrocortisone (ANUSOL-HC) 25 mg suppository, Insert 1 suppository (25 mg total) into the rectum 2 (two) times a day as needed for hemorrhoids, Disp: 12 suppository, Rfl: 0    Multiple Vitamins-Minerals (PreserVision AREDS 2) CAPS, Take 1 capsule by mouth daily , Disp: , Rfl:     Ozempic, 1 MG/DOSE, 4 MG/3ML injection pen, Inject 0.75 mL (1 mg total) under the skin every 7 days, Disp: 9 mL, Rfl: 2    prednisoLONE acetate (PRED FORTE) 1 % ophthalmic suspension, PUT 1 DROP EVERY 2 HOURS INTO RIGHT EYE TO BE STARTED AFTER SURGERY IS COMPLETED, Disp: , Rfl:     ramipril (ALTACE) 10 MG capsule, Take 1 capsule (10 mg total) by mouth daily, Disp: 90 capsule, Rfl: 1    ofloxacin (OCUFLOX) 0.3 % ophthalmic solution, , Disp: , Rfl:     ALLERGIES:  Allergies   Allergen Reactions    Food Allergy [Soybean Oil - Food Allergy] Anaphylaxis     Organic soy milk. Pt reports having allergy shots  at that time.       REVIEW OF SYSTEMS:  Pertinent items are noted in HPI.  A comprehensive review of systems was negative.    LABS:  HgA1c:   Lab Results   Component Value Date    HGBA1C 8.4 (H) 04/18/2025     BMP:   Lab Results   Component Value Date    CALCIUM 9.5 03/31/2025    K 4.1 03/31/2025    CO2 30 03/31/2025     03/31/2025    BUN 15 03/31/2025    CREATININE 0.85 03/31/2025         _____________________________________________________  PHYSICAL EXAMINATION:  There were no vitals taken for this visit.    General: Well developed and well nourished, alert & oriented x 3, appears comfortable   Psychiatric: Normal   HEENT: Normocephalic, Atraumatic Trachea Midline, No torticollis   Pulmonary: No audible wheezing or respiratory distress   Abdomen/GI: Non tender, non distended   Cardiovascular: No pitting edema, 2+ radial pulse       MUSCULOSKELETAL EXAMINATION:  Left Wrist Pain   Skin intact  Swelling over first dorsal compartment  WHAT Test positive  Finkelstein positive  Tenderness at first dorsal compartment  No TTP thumb CMC, snuffbox, dorsal distal radius or DRUJ    Range of Motion:  Elbow: extension/flexion 0/140  Forearm: pronation/supination 80/80  Wrist: extension/flexion 70/70  Digit: full AROM in DIP, PIP, MP joints  Neurovascular:  Motor Exam: firing AIN/PIN/U. 5/5 motor   Sensory Exam: Sensation intact to light touch in FDWS (radial), volar IF (median), volar SF (ulnar)  Vascular Exam: < 2 sec capillary refill     _____________________________________________________  STUDIES REVIEWED:  Radiographs: I personally reviewed and independently interpreted the available radiographs.  5/2/2025: Radiographs of the left wrist, multiple views, demonstrate no evidence of fracture or dislocation.       Juan Merchant MD  Hand and Upper Extremity Surgery        *This note was dictated using Dragon voice recognition software. Please excuse any word substitutions or errors.*       Scribe Attestation      I,:   Chloé Camarillo am acting as a scribe while in the presence of the attending physician.:       I,:  Juan Merchant MD personally performed the services described in this documentation    as scribed in my presence.:

## 2025-05-02 NOTE — ASSESSMENT & PLAN NOTE
70 y.o. male presents with signs and symptoms consistent with the above diagnosis.  We discussed the natural history of this condition and its pathogenesis.  We discussed operative and nonoperative treatment options.  The anatomy and physiology of de Quervain's tenosynovitis was discussed with the patient today in the office.  Edema and increased contact pressure within the first dorsal extensor compartment at the radial styloid can cause pain, crepitation, and limitation of function.  Treatment options include resting thumb spica splints to decrease edema, oral anti-inflammatory medications, home or formal therapy exercises, up to 2 steroid injections within the first dorsal extensor compartment, or surgical release.  While majority of patients do respond to conservative treatment, up to 20% may require surgical release.   Left wrist DeQuervain's tenosynovitis.   - After a thorough discussion of risks, benefits, and alternatives, patient is indicated for non-operative treatment.  - Cortisone injection is given today.   - Patient was fitted for a thumb spica splint which should be worn at all times for the next two  weeks. Following two weeks, patient will wean out of splint and begin occupation therapy.    - Follow up in 6 weeks if symptoms have not completely resolved. Patient may require surgical intervention if the symptoms persist.   - Patient/Guardian agrees and consents to the treatment plan.  All questions answered.   He expressed understanding of the plan and agreed. We encouraged them to contact our office with any questions or concerns.     Orders:    XR wrist 3+ vw left; Future    Durable Medical Equipment

## 2025-05-07 RX ORDER — BETAMETHASONE SODIUM PHOSPHATE AND BETAMETHASONE ACETATE 3; 3 MG/ML; MG/ML
6 INJECTION, SUSPENSION INTRA-ARTICULAR; INTRALESIONAL; INTRAMUSCULAR; SOFT TISSUE
Status: COMPLETED | OUTPATIENT
Start: 2025-05-02 | End: 2025-05-02

## 2025-05-07 RX ORDER — LIDOCAINE HYDROCHLORIDE 10 MG/ML
1 INJECTION, SOLUTION INFILTRATION; PERINEURAL
Status: COMPLETED | OUTPATIENT
Start: 2025-05-02 | End: 2025-05-02

## 2025-06-05 DIAGNOSIS — I10 ESSENTIAL HYPERTENSION: ICD-10-CM

## 2025-06-05 RX ORDER — HYDROCHLOROTHIAZIDE 25 MG/1
25 TABLET ORAL DAILY
Qty: 90 TABLET | Refills: 0 | OUTPATIENT
Start: 2025-06-05

## 2025-06-06 ENCOUNTER — OFFICE VISIT (OUTPATIENT)
Dept: OBGYN CLINIC | Facility: CLINIC | Age: 70
End: 2025-06-06
Payer: COMMERCIAL

## 2025-06-06 VITALS — HEIGHT: 67 IN | WEIGHT: 204 LBS | BODY MASS INDEX: 32.02 KG/M2

## 2025-06-06 DIAGNOSIS — M65.4 DE QUERVAIN'S DISEASE (TENOSYNOVITIS): Primary | ICD-10-CM

## 2025-06-06 PROCEDURE — 99213 OFFICE O/P EST LOW 20 MIN: CPT | Performed by: STUDENT IN AN ORGANIZED HEALTH CARE EDUCATION/TRAINING PROGRAM

## 2025-06-06 NOTE — PROGRESS NOTES
Orthopedic Surgery Management Note  Dawson Jane (70 y.o. male)  : 1955 Encounter Date: 2025      Assessment and Plan:    Assessment & Plan  De Quervain's disease (tenosynovitis)  Patient has noticed improvements in symptoms since receiving the cortisone injection.   Continue to monitor symptoms.   Activities as tolerated.   Follow-up as needed.            Chief Complaint:     Left wrist follow-up    Previous History:   Patient was last seen in the office on 2025 for left De Quervain's tenosynotivits. Patient was provided with a cortisone injection at that time.     Interval History:  Patient reports he had good relief from the injection. Patient reports the occassional twinge in the left wrist. Patient mentions he also had carpal tunnel symptoms in the right wrist when driving to the [place]. However, symptoms have resolved.     Past Medical History:  Past Medical History[1]  Past Surgical History[2]  Family History[3]  Social History     Socioeconomic History    Marital status: /Civil Union     Spouse name: Not on file    Number of children: Not on file    Years of education: Not on file    Highest education level: Not on file   Occupational History    Not on file   Tobacco Use    Smoking status: Former     Current packs/day: 0.00     Types: Cigarettes     Quit date: 1985     Years since quittin.3     Passive exposure: Past    Smokeless tobacco: Never   Vaping Use    Vaping status: Never Used   Substance and Sexual Activity    Alcohol use: Yes     Alcohol/week: 5.0 standard drinks of alcohol     Types: 5 Cans of beer per week    Drug use: Never    Sexual activity: Yes     Partners: Female     Birth control/protection: Female Sterilization   Other Topics Concern    Not on file   Social History Narrative    Not on file     Social Drivers of Health     Financial Resource Strain: Not on file   Food Insecurity: Not on file   Transportation Needs: Not on file   Physical Activity: Not  "on file   Stress: Not on file   Social Connections: Unknown (6/18/2024)    Received from [employer]     How often do you feel lonely or isolated from those around you? (Adult - for ages 18 years and over): Not on file   Intimate Partner Violence: Not on file   Housing Stability: Not on file     Scheduled Meds:  Continuous Infusions:No current facility-administered medications for this visit.    PRN Meds:.  Allergies[4]    Physical Examination:    Ht 5' 7\" (1.702 m)   Wt 92.5 kg (204 lb)   BMI 31.95 kg/m²     Gen: A&Ox3, NAD  Cardiac: regular rate  Chest: non labored breathing  Abdomen: Non-distended      Left Upper Extremity:  Skin CDI  No tenderness to palpation throughout  Negative Finkelstein and WHAT test  Sensation intact to light touch in the axillary median, ulnar, and radial nerve distributions  NV intact          Juan Merchant MD  Hand and Upper Extremity Surgery      *This note was dictated using Dragon voice recognition software. Please excuse any word substitutions or errors.*      Scribe Attestation      I,:  Shannan Jiménez PA-C am acting as a scribe while in the presence of the attending physician.:       I,:  Juan Merchant MD personally performed the services described in this documentation    as scribed in my presence.:                  [1]   Past Medical History:  Diagnosis Date    Allergic Seasonal    Anesthesia     Pt reports that he became agitated after IV sedation for a colonoscopy    Coronary artery disease 08/2014    COVID-19 01/2021    Pt reports fever, fatigue, nausea and weakness. Pt denies breathing issues. Symptoms lasted 2 weeks.    Diabetes mellitus (HCC)     GERD (gastroesophageal reflux disease)     Hard to intubate     Pt received a letter stating that it was hard to visualize vocal chords    Heart attack (HCC)     High cholesterol     Hypertension     Myocardial infarction (HCC) 08/2014    Stent    Wears glasses    [2]   Past Surgical " History:  Procedure Laterality Date    CARDIAC CATHETERIZATION  08/19/2014    Pt reports having one stent placed.    CATARACT EXTRACTION      CHOLECYSTECTOMY      COLONOSCOPY      [facility] SURGICAL ARTHROSCOPY SHOULDER XTNSV DBRDMT 3+ Left 05/06/2021    Procedure: ARTHROSCOPY SHOULDER , debridement; open rotator cuff repair;  Surgeon: Wayne Peace;  Location:  MAIN OR;  Service: Orthopedics    VASECTOMY      VASECTOMY REVERSAL     [3]   Family History  Problem Relation Name Age of Onset    Dementia Mother Ashley     No Known Problems Father     [4]   Allergies  Allergen Reactions    Food Allergy [Soybean Oil - Food Allergy] Anaphylaxis     Organic soy milk. Pt reports having allergy shots at that time.

## 2025-06-06 NOTE — ASSESSMENT & PLAN NOTE
Patient has noticed improvements in symptoms since receiving the cortisone injection.   Continue to monitor symptoms.   Activities as tolerated.   Follow-up as needed.

## 2025-06-14 DIAGNOSIS — K64.1 GRADE II HEMORRHOIDS: ICD-10-CM

## 2025-06-14 DIAGNOSIS — K62.5 RECTAL BLEEDING: ICD-10-CM

## 2025-06-16 RX ORDER — HYDROCORTISONE ACETATE 25 MG/1
25 SUPPOSITORY RECTAL 2 TIMES DAILY PRN
Qty: 12 SUPPOSITORY | Refills: 0 | Status: SHIPPED | OUTPATIENT
Start: 2025-06-16

## 2025-07-17 ENCOUNTER — OFFICE VISIT (OUTPATIENT)
Dept: OBGYN CLINIC | Facility: CLINIC | Age: 70
End: 2025-07-17
Payer: COMMERCIAL

## 2025-07-17 VITALS — BODY MASS INDEX: 31.86 KG/M2 | WEIGHT: 203 LBS | HEIGHT: 67 IN

## 2025-07-17 DIAGNOSIS — M65.4 DE QUERVAIN'S DISEASE (TENOSYNOVITIS): Primary | ICD-10-CM

## 2025-07-17 DIAGNOSIS — G56.01 CARPAL TUNNEL SYNDROME ON RIGHT: ICD-10-CM

## 2025-07-17 PROCEDURE — 20550 NJX 1 TENDON SHEATH/LIGAMENT: CPT | Performed by: STUDENT IN AN ORGANIZED HEALTH CARE EDUCATION/TRAINING PROGRAM

## 2025-07-17 PROCEDURE — 99213 OFFICE O/P EST LOW 20 MIN: CPT | Performed by: STUDENT IN AN ORGANIZED HEALTH CARE EDUCATION/TRAINING PROGRAM

## 2025-07-17 RX ORDER — LIDOCAINE HYDROCHLORIDE 10 MG/ML
1 INJECTION, SOLUTION INFILTRATION; PERINEURAL
Status: COMPLETED | OUTPATIENT
Start: 2025-07-17 | End: 2025-07-17

## 2025-07-17 RX ORDER — TRIAMCINOLONE ACETONIDE 40 MG/ML
40 INJECTION, SUSPENSION INTRA-ARTICULAR; INTRAMUSCULAR
Status: COMPLETED | OUTPATIENT
Start: 2025-07-17 | End: 2025-07-17

## 2025-07-17 RX ADMIN — TRIAMCINOLONE ACETONIDE 40 MG: 40 INJECTION, SUSPENSION INTRA-ARTICULAR; INTRAMUSCULAR at 09:45

## 2025-07-17 RX ADMIN — LIDOCAINE HYDROCHLORIDE 1 ML: 10 INJECTION, SOLUTION INFILTRATION; PERINEURAL at 09:45

## 2025-07-17 NOTE — PROGRESS NOTES
Orthopedic Surgery Management Note  Dawson Jane (70 y.o. male)  : 1955 Encounter Date: 2025      Assessment and Plan:  The patient is a 70-year-old male previously underwent a left first dorsal extensor compartment injection for de Quervain's tenosynovitis.  He had resolution of his symptoms but he since had a recurrence of his symptoms.  He presented today requesting follow-up and a second injection.  We discussed the risk and benefits and did provide him with a second injection today.  He tolerated this well.  He understands that this is the last injection that we can give and if he has recurrence of his symptoms we will recommend surgery.  Surgery is typically not performed within 3 months of an injection to minimize risks.      Carpal Tunnel Syndrome: The anatomy and physiology of carpal tunnel syndrome was discussed with the patient today.  Increase pressure localized under the transverse carpal ligament can cause pain, numbness, tingling, or dysesthesias within the median nerve distribution as well as feelings of fatigue, clumsiness, or awkwardness.  These symptoms typically occur at night and worse in the morning upon waking.  Eventually, untreated carpal tunnel syndrome can result in weakness and permanent loss of muscle within the thenar compartment of the hand.  Treatment options were discussed with the patient.  Conservative treatment includes nocturnal resting splints to keep the nerve in a neutral position, ergonomic changes within the work or home environment, activity modification, and tendon gliding exercises. Vitamin B6 one tablet daily over the counter may helpful to reduce symptoms.   Steroid injections within the carpal canal can help a majority of patients, however this is often self-limited in a majority of patients.  Surgical intervention to divide the transverse carpal ligament typically results in a long-lasting relief of the patient's complaints, with the recurrence rate of  less than 1%.                                                                                                                                                                                   De Quervain Tenosynovitis: The anatomy and physiology of de Quervain's tenosynovitis was discussed with the patient today in the office.  Edema and increased contact pressure within the first dorsal extensor compartment at the radial styloid can cause pain, crepitation, and limitation of function.  Treatment options include resting thumb spica splints to decrease edema, oral anti-inflammatory medications, home or formal therapy exercises, up to 2 steroid injections within the first dorsal extensor compartment, or surgical release.  While majority of patients do respond to conservative treatment, up to 20% may require surgical release.     Follow-up PRN  Assessment & Plan  De Quervain's disease (tenosynovitis)         Carpal tunnel syndrome on right             Chief Complaint:     Bilateral wrist follow-up    Previous History:   Patient was last seen in the office on 6/06/2025 for left De Quervain's tenosynovitis and right carpal tunnel.     Interval History:  Patient reports today for follow-up evaluation of bilateral wrists. He notes that the injection he received for his left wrist De Quervain's tenosynovitis on 5/7/2025 provided him with significant relief until recently. He notes his left wrist symptoms have returned but are not as intense as his original symptoms. Additionally, he notes a two day flair in his carpal tunnel symptoms after performing intense yard work the day prior. His right wrist symptoms have resolved at this time. He continues to perform nighttime bracing.     Past Medical History:  Past Medical History[1]  Past Surgical History[2]  Family History[3]  Social History     Socioeconomic History    Marital status: /Civil Union     Spouse name: Not on file    Number of children: Not on file    Years  "of education: Not on file    Highest education level: Not on file   Occupational History    Not on file   Tobacco Use    Smoking status: Former     Current packs/day: 0.00     Types: Cigarettes     Quit date: 1985     Years since quittin.4     Passive exposure: Past    Smokeless tobacco: Never   Vaping Use    Vaping status: Never Used   Substance and Sexual Activity    Alcohol use: Yes     Alcohol/week: 5.0 standard drinks of alcohol     Types: 5 Cans of beer per week    Drug use: Never    Sexual activity: Yes     Partners: Female     Birth control/protection: Female Sterilization   Other Topics Concern    Not on file   Social History Narrative    Not on file     Social Drivers of Health     Financial Resource Strain: Not on file   Food Insecurity: Not on file   Transportation Needs: Not on file   Physical Activity: Not on file   Stress: Not on file   Social Connections: Unknown (2024)    Received from [employer]     How often do you feel lonely or isolated from those around you? (Adult - for ages 18 years and over): Not on file   Intimate Partner Violence: Not on file   Housing Stability: Not on file     Scheduled Meds:  Continuous Infusions:No current facility-administered medications for this visit.    PRN Meds:.  Allergies[4]    Physical Examination:  Ht 5' 7\" (1.702 m)   Wt 92.1 kg (203 lb)   BMI 31.79 kg/m²     Gen: A&Ox3, NAD  Cardiac: regular rate  Chest: non labored breathing  Abdomen: Non-distended    Left Upper Extremity:  Skin CDI  Tenderness over the first dorsal extensor compartment  + Finkelstein   + WHAT test  Sensation intact to light touch in the axillary median, ulnar, and radial nerve distributions  NV intact    Right Upper Extremity:  Skin CDI  No tenderness to palpation throughout  - Finkelstein and WHAT test  - Tinel's   Sensation intact to light touch in the axillary median, ulnar, and radial nerve distributions  NV intact    Procedures:   Hand/upper " "extremity injection: L extensor compartment 1    Universal Protocol:  Consent: Verbal consent obtained  Risks and benefits: risks, benefits and alternatives were discussed  Consent given by: patient  Time out: Immediately prior to procedure a \"time out\" was called to verify the correct patient, procedure, equipment, support staff and site/side marked as required.  Patient understanding: patient states understanding of the procedure being performed  Test results: test results available and properly labeled  Site marked: the operative site was marked  Patient identity confirmed: verbally with patient  Supporting Documentation  Indications: tendon swelling and pain   Procedure Details  Condition:de Quervain's tenosynovitis Site: L extensor compartment 1   Needle size: 25 G  Ultrasound guidance: no  Medications administered: 1 mL lidocaine 1 %; 40 mg triamcinolone acetonide 40 mg/mL  Patient tolerance: patient tolerated the procedure well with no immediate complications  Dressing:  Sterile dressing applied             Scribe Attestation      I,:  Candice Palma PA-C am acting as a scribe while in the presence of the attending physician.:       I,:  Juan Merchant MD personally performed the services described in this documentation    as scribed in my presence.:                      [1]   Past Medical History:  Diagnosis Date    Allergic Seasonal    Anesthesia     Pt reports that he became agitated after IV sedation for a colonoscopy    Coronary artery disease 08/2014    COVID-19 01/2021    Pt reports fever, fatigue, nausea and weakness. Pt denies breathing issues. Symptoms lasted 2 weeks.    Diabetes mellitus (HCC)     GERD (gastroesophageal reflux disease)     Hard to intubate     Pt received a letter stating that it was hard to visualize vocal chords    Heart attack (HCC)     High cholesterol     Hypertension     Myocardial infarction (HCC) 08/2014    Stent    Wears glasses    [2]   Past Surgical " History:  Procedure Laterality Date    CARDIAC CATHETERIZATION  08/19/2014    Pt reports having one stent placed.    CATARACT EXTRACTION      CHOLECYSTECTOMY      COLONOSCOPY      [facility] SURGICAL ARTHROSCOPY SHOULDER XTNSV DBRDMT 3+ Left 05/06/2021    Procedure: ARTHROSCOPY SHOULDER , debridement; open rotator cuff repair;  Surgeon: Wayne Peace;  Location:  MAIN OR;  Service: Orthopedics    VASECTOMY      VASECTOMY REVERSAL     [3]   Family History  Problem Relation Name Age of Onset    Dementia Mother Ashley     No Known Problems Father     [4]   Allergies  Allergen Reactions    Food Allergy [Soybean Oil - Food Allergy] Anaphylaxis     Organic soy milk. Pt reports having allergy shots at that time.

## 2025-08-08 ENCOUNTER — OFFICE VISIT (OUTPATIENT)
Dept: FAMILY MEDICINE CLINIC | Facility: CLINIC | Age: 70
End: 2025-08-08
Payer: COMMERCIAL

## 2025-08-08 VITALS
OXYGEN SATURATION: 97 % | TEMPERATURE: 98.5 F | SYSTOLIC BLOOD PRESSURE: 144 MMHG | HEIGHT: 67 IN | WEIGHT: 203.38 LBS | BODY MASS INDEX: 31.92 KG/M2 | DIASTOLIC BLOOD PRESSURE: 76 MMHG | HEART RATE: 84 BPM

## 2025-08-08 DIAGNOSIS — K64.8 OTHER HEMORRHOIDS: ICD-10-CM

## 2025-08-08 DIAGNOSIS — I10 WHITE COAT SYNDROME WITH DIAGNOSIS OF HYPERTENSION: ICD-10-CM

## 2025-08-08 DIAGNOSIS — I10 PRIMARY HYPERTENSION: ICD-10-CM

## 2025-08-08 DIAGNOSIS — E78.00 HIGH CHOLESTEROL: ICD-10-CM

## 2025-08-08 DIAGNOSIS — K21.9 GASTROESOPHAGEAL REFLUX DISEASE WITHOUT ESOPHAGITIS: ICD-10-CM

## 2025-08-08 DIAGNOSIS — E11.9 TYPE 2 DIABETES MELLITUS WITHOUT COMPLICATION, WITHOUT LONG-TERM CURRENT USE OF INSULIN (HCC): Primary | ICD-10-CM

## 2025-08-08 LAB — SL AMB POCT HEMOGLOBIN AIC: 9.1 (ref ?–6.5)

## 2025-08-08 PROCEDURE — 83036 HEMOGLOBIN GLYCOSYLATED A1C: CPT | Performed by: NURSE PRACTITIONER

## 2025-08-08 PROCEDURE — 99397 PER PM REEVAL EST PAT 65+ YR: CPT | Performed by: NURSE PRACTITIONER

## 2025-08-08 RX ORDER — METFORMIN HYDROCHLORIDE 500 MG/1
500 TABLET, EXTENDED RELEASE ORAL
Qty: 90 TABLET | Refills: 1 | Status: SHIPPED | OUTPATIENT
Start: 2025-08-08

## (undated) DEVICE — Device

## (undated) DEVICE — INTENDED FOR TISSUE SEPARATION, AND OTHER PROCEDURES THAT REQUIRE A SHARP SURGICAL BLADE TO PUNCTURE OR CUT.: Brand: BARD-PARKER SAFETY BLADES SIZE 15, STERILE

## (undated) DEVICE — PLUMEPEN PRO 10FT

## (undated) DEVICE — SYRINGE 50ML LL

## (undated) DEVICE — U-DRAPE: Brand: CONVERTORS

## (undated) DEVICE — STOCKINETTE,IMPERVIOUS,12X48,STERILE: Brand: MEDLINE

## (undated) DEVICE — SUT NYLON 4-0 P-3 18 IN 699G

## (undated) DEVICE — 3M™ DURAPORE™ SURGICAL TAPE 1538-3, 3 INCH X 10 YARD (7,5CM X 9,1M), 4 ROLLS/BOX: Brand: 3M™ DURAPORE™

## (undated) DEVICE — GLOVE SRG BIOGEL 7.5

## (undated) DEVICE — PUDDLE VAC

## (undated) DEVICE — SPONGE 4 X 4 XRAY 16 PLY STRL LF RFD

## (undated) DEVICE — BETHLEHEM UNIVERSAL  ARTHRO PK: Brand: CARDINAL HEALTH

## (undated) DEVICE — NEEDLE 18 G X 1 1/2 SAFETY

## (undated) DEVICE — TELFA NON-ADHERENT ABSORBENT DRESSING: Brand: TELFA

## (undated) DEVICE — INTENT TO BE USED WITH SUTURE MATERIAL FOR TISSUE CLOSURE: Brand: RICHARD-ALLAN®  NEEDLE 1/2 CIRCLE REVERSE CUTTING

## (undated) DEVICE — POSITIONER BEACH CHAIR FOAM KIT

## (undated) DEVICE — 10FR FRAZIER SUCTION HANDLE: Brand: CARDINAL HEALTH

## (undated) DEVICE — GAUZE SPONGES,16 PLY: Brand: CURITY

## (undated) DEVICE — ADHESIVE SKIN HIGH VISCOSITY EXOFIN 1ML

## (undated) DEVICE — GLOVE INDICATOR UNDERGLOVE SZ 7.5 GREEN

## (undated) DEVICE — SUT VICRYL 2-0 CP-1 27 IN J266H

## (undated) DEVICE — SPONGE LAP 18 X 18 IN STRL RFD

## (undated) DEVICE — BLADE SHAVER CUTTER BN 4MM 13CM COOLCUT

## (undated) DEVICE — ABDOMINAL PAD: Brand: DERMACEA